# Patient Record
Sex: FEMALE | Race: WHITE | NOT HISPANIC OR LATINO | Employment: OTHER | URBAN - METROPOLITAN AREA
[De-identification: names, ages, dates, MRNs, and addresses within clinical notes are randomized per-mention and may not be internally consistent; named-entity substitution may affect disease eponyms.]

---

## 2017-02-03 ENCOUNTER — OFFICE VISIT (OUTPATIENT)
Dept: URGENT CARE | Facility: PHYSICIAN GROUP | Age: 82
End: 2017-02-03
Payer: COMMERCIAL

## 2017-02-03 ENCOUNTER — HOSPITAL ENCOUNTER (OUTPATIENT)
Facility: MEDICAL CENTER | Age: 82
End: 2017-02-03
Attending: NURSE PRACTITIONER
Payer: COMMERCIAL

## 2017-02-03 VITALS
TEMPERATURE: 97.5 F | HEIGHT: 60 IN | WEIGHT: 125 LBS | HEART RATE: 71 BPM | BODY MASS INDEX: 24.54 KG/M2 | RESPIRATION RATE: 16 BRPM | DIASTOLIC BLOOD PRESSURE: 70 MMHG | OXYGEN SATURATION: 97 % | SYSTOLIC BLOOD PRESSURE: 118 MMHG

## 2017-02-03 DIAGNOSIS — R30.0 DYSURIA: ICD-10-CM

## 2017-02-03 LAB
APPEARANCE UR: NORMAL
BILIRUB UR STRIP-MCNC: NORMAL MG/DL
COLOR UR AUTO: YELLOW
GLUCOSE UR STRIP.AUTO-MCNC: NORMAL MG/DL
KETONES UR STRIP.AUTO-MCNC: NORMAL MG/DL
LEUKOCYTE ESTERASE UR QL STRIP.AUTO: NORMAL
NITRITE UR QL STRIP.AUTO: NORMAL
PH UR STRIP.AUTO: 7.5 [PH] (ref 5–8)
PROT UR QL STRIP: NORMAL MG/DL
RBC UR QL AUTO: NORMAL
SP GR UR STRIP.AUTO: 1
UROBILINOGEN UR STRIP-MCNC: NORMAL MG/DL

## 2017-02-03 PROCEDURE — G8484 FLU IMMUNIZE NO ADMIN: HCPCS | Performed by: NURSE PRACTITIONER

## 2017-02-03 PROCEDURE — 81002 URINALYSIS NONAUTO W/O SCOPE: CPT | Performed by: NURSE PRACTITIONER

## 2017-02-03 PROCEDURE — 99213 OFFICE O/P EST LOW 20 MIN: CPT | Performed by: NURSE PRACTITIONER

## 2017-02-03 PROCEDURE — G8432 DEP SCR NOT DOC, RNG: HCPCS | Performed by: NURSE PRACTITIONER

## 2017-02-03 PROCEDURE — 87086 URINE CULTURE/COLONY COUNT: CPT

## 2017-02-03 PROCEDURE — 1036F TOBACCO NON-USER: CPT | Performed by: NURSE PRACTITIONER

## 2017-02-03 PROCEDURE — 4040F PNEUMOC VAC/ADMIN/RCVD: CPT | Performed by: NURSE PRACTITIONER

## 2017-02-03 PROCEDURE — G8420 CALC BMI NORM PARAMETERS: HCPCS | Performed by: NURSE PRACTITIONER

## 2017-02-03 PROCEDURE — 1101F PT FALLS ASSESS-DOCD LE1/YR: CPT | Mod: 8P | Performed by: NURSE PRACTITIONER

## 2017-02-03 PROCEDURE — 87186 SC STD MICRODIL/AGAR DIL: CPT

## 2017-02-03 PROCEDURE — 87077 CULTURE AEROBIC IDENTIFY: CPT

## 2017-02-03 RX ORDER — NITROFURANTOIN 25; 75 MG/1; MG/1
100 CAPSULE ORAL 2 TIMES DAILY
Qty: 10 CAP | Refills: 0 | Status: SHIPPED | OUTPATIENT
Start: 2017-02-03 | End: 2017-02-08

## 2017-02-03 ASSESSMENT — ENCOUNTER SYMPTOMS
FLANK PAIN: 0
VOMITING: 0
NAUSEA: 0
CHILLS: 0
FEVER: 0

## 2017-02-03 NOTE — PROGRESS NOTES
Subjective:      Rosa M Reeves is a 86 y.o. female who presents with No chief complaint on file.    Past Medical History   Diagnosis Date   • Hypertension    • GERD (gastroesophageal reflux disease)      Social History     Social History   • Marital Status:      Spouse Name: N/A   • Number of Children: N/A   • Years of Education: N/A     Occupational History   • Not on file.     Social History Main Topics   • Smoking status: Never Smoker    • Smokeless tobacco: Never Used   • Alcohol Use: Yes      Comment: holidays   • Drug Use: No   • Sexual Activity: Not Currently     Other Topics Concern   • Not on file     Social History Narrative     Family History   Problem Relation Age of Onset   • Heart Disease Mother    • Heart Disease Father    • Cancer Neg Hx    • Diabetes Neg Hx        Allergies: Review of patient's allergies indicates no known allergies.    This patient is a 86-year-old female who presents today with complaint of dysuria, urgency, and frequency. Symptoms started this morning. She does not have a prior history of recurrent bladder infections, and states it has been a long time since her last infection. She denies fever, aches, or chills. She denies any lower back pain.              Dysuria   This is a new problem. The current episode started today. The problem occurs every urination. The problem has been unchanged. The quality of the pain is described as aching and burning. The pain is moderate. There has been no fever. Associated symptoms include frequency and urgency. Pertinent negatives include no chills, flank pain, hematuria, nausea or vomiting. She has tried nothing for the symptoms. The treatment provided no relief.       Review of Systems   Constitutional: Negative for fever, chills and malaise/fatigue.   Gastrointestinal: Negative for nausea and vomiting.   Genitourinary: Positive for dysuria, urgency and frequency. Negative for hematuria and flank pain.   Skin: Negative.        All other  "systems reviewed and are negative        Objective:     There were no vitals taken for this visit.     Physical Exam   Constitutional: She is oriented to person, place, and time. She appears well-developed and well-nourished. No distress.   Neck: Normal range of motion. Neck supple.   Cardiovascular: Normal rate and regular rhythm.    Pulmonary/Chest: Effort normal and breath sounds normal.   Abdominal: Soft. Bowel sounds are normal. She exhibits no distension and no mass. There is no tenderness. There is no rebound and no guarding. No hernia.   No CVA tenderness, no suprapubic tenderness.   Neurological: She is alert and oriented to person, place, and time.   Skin: Skin is warm and dry. She is not diaphoretic.   Psychiatric: She has a normal mood and affect. Her behavior is normal.   Vitals reviewed.           UA:\" Positive for blood, positive leukocytes     Assessment/Plan:      Urinary tract infection  Dysuria  -Macrobid-  -urine culture-  -push fluids  -Return for persistent or worsening of symptoms        There are no diagnoses linked to this encounter.      "

## 2017-02-03 NOTE — MR AVS SNAPSHOT
Rosa M Reeves   2/3/2017 1:15 PM   Office Visit   MRN: [id number]    Department:  [facility]   Dept Phone:  [phone]    Description:  Female : 1930   Provider:  Cathey J Hamman, A.P.N.           Reason for Visit     Dysuria Difficulty urinating, burning upon urination, onset this morning.      Allergies as of 2/3/2017     No Known Allergies      You were diagnosed with     Dysuria   [788.1.ICD-9-CM]         Vital Signs     Blood Pressure Pulse Temperature Respirations Height Weight    118/70 mmHg 71 36.4 °C (97.5 °F) 16 1.524 m (5') 56.7 kg (125 lb)    Body Mass Index Oxygen Saturation Smoking Status             24.41 kg/m2 97% Never Smoker          Basic Information     Date Of Birth Sex Race Ethnicity Preferred Language    1930 Female White Non- English      Your appointments     Mar 20, 2017  2:20 PM   Established Patient with Faith Burr D.O.   [facility] ([place])    [address] [place] [id number] [phone]           You will be receiving a confirmation call a few days before your appointment from our automated call confirmation system.              Problem List              ICD-10-CM Priority Class Noted - Resolved    Aphasia R47.01   2016 - Present    Essential hypertension I10   2/10/2016 - Present    Gastroesophageal reflux disease without esophagitis K21.9   2/10/2016 - Present    Mild intermittent asthma without complication J45.20   2/10/2016 - Present    Pure hypercholesterolemia E78.00   2016 - Present      Health Maintenance        Date Due Completion Dates    IMM DTaP/Tdap/Td Vaccine (1 - Tdap) 1949 ---    IMM ZOSTER VACCINE 1990 ---    BONE DENSITY 1995 ---    IMM INFLUENZA (1) 2016    IMM PNEUMOCOCCAL 65+ (ADULT) LOW/MEDIUM RISK SERIES (2 of 2 - PPSV23) 2016            Results     POCT Urinalysis      Component Value Standard Range & Units    POC Color Yellow Negative    POC  Appearance Cloudy Negative    POC Leukocyte Esterase MOD Negative    POC Nitrites Neg Negative    POC Urobiligen Neg Negative (0.2) mg/dL    POC Protein Neg Negative mg/dL    POC Urine PH 7.5 5.0 - 8.0    POC Blood MOD Negative    POC Specific Gravity 1.005 <1.005 - >1.030    POC Ketones Neg Negative mg/dL    POC Biliruben Neg Negative mg/dL    POC Glucose Neg Negative mg/dL                        Current Immunizations     13-VALENT PCV PREVNAR 11/5/2015    Influenza Vaccine Quad Inj (Pf) 11/5/2015      Below and/or attached are the medications your provider expects you to take. Review all of your home medications and newly ordered medications with your provider and/or pharmacist. Follow medication instructions as directed by your provider and/or pharmacist. Please keep your medication list with you and share with your provider. Update the information when medications are discontinued, doses are changed, or new medications (including over-the-counter products) are added; and carry medication information at all times in the event of emergency situations     Allergies:  No Known Allergies          Medications  Valid as of: February 03, 2017 -  6:17 PM    Generic Name Brand Name Tablet Size Instructions for use    Albuterol Sulfate (Aero Soln) albuterol 108 (90 BASE) MCG/ACT Inhale 2 Puffs by mouth every 6 hours as needed for Shortness of Breath.        AmLODIPine Besylate (Tab) NORVASC 5 MG Take 1 Tab by mouth every day.        Aspirin (Tablet Delayed Response) ECOTRIN 81 MG Take 1 Tab by mouth every day.        Atorvastatin Calcium (Tab) LIPITOR 20 MG Take 1 Tab by mouth every day.        Benzonatate (Cap) TESSALON 200 MG Take 1 Cap by mouth 3 times a day as needed for Cough.        Guaifenesin-Codeine (Solution) ROBITUSSIN -10 mg/5mL 5-10 ML (1-2 TEASPOONS) EVERY 6 HOURS ONLY IF NEEDED FOR COUGH. MAY CAUSE DROWSINESS.        HydroCHLOROthiazide (Cap) MICROZIDE 12.5 MG Take 1 Cap by mouth every day.         LevoFLOXacin (Tab) LEVAQUIN 250 MG 1 TAB ONCE A DAY X 10 DAYS.        Losartan Potassium (Tab) COZAAR 50 MG Take 1 Tab by mouth every day.        Nitrofurantoin Monohyd Macro (Cap) MACROBID 100 MG Take 1 Cap by mouth 2 times a day for 5 days.        Omeprazole (CAPSULE DELAYED RELEASE) PRILOSEC 20 MG Take 1 Cap by mouth every day.        .                 Medicines prescribed today were sent to:     [facility]/PHARMACY #[id number] - [place], [place] - [address]    [address] [place] [address]    Phone: [phone] Fax: [phone]    Open 24 Hours?: No    [facility] - [place], [place] - [address] AT PORTAL TO REGISTERED [facility]    [address] [facility] [address]    Phone: [phone] Fax: [phone]    Open 24 Hours?: No      Medication refill instructions:       If your prescription bottle indicates you have medication refills left, it is not necessary to call your provider’s office. Please contact your pharmacy and they will refill your medication.    If your prescription bottle indicates you do not have any refills left, you may request refills at any time through one of the following ways: The online Workec system (except Urgent Care), by calling your provider’s office, or by asking your pharmacy to contact your provider’s office with a refill request. Medication refills are processed only during regular business hours and may not be available until the next business day. Your provider may request additional information or to have a follow-up visit with you prior to refilling your medication.   *Please Note: Medication refills are assigned a new Rx number when refilled electronically. Your pharmacy may indicate that no refills were authorized even though a new prescription for the same medication is available at the pharmacy. Please request the medicine by name with the pharmacy before contacting your provider for a refill.        Your To Do List     Future  Labs/Procedures Complete By Expires    URINE CULTURE(NEW)  As directed 2/3/2018         AXSionics Access Code: [email or web address]  Expires: 3/5/2017  6:17 PM    AXSionics  A secure, online tool to manage your health information     [employer]’s AXSionics® is a secure, online tool that connects you to your personalized health information from the privacy of your home -- day or night - making it very easy for you to manage your healthcare. Once the activation process is completed, you can even access your medical information using the AXSionics oswaldo, which is available for free in the Apple Oswaldo store or Google Play store.     AXSionics provides the following levels of access (as shown below):   My Chart Features   [facility] Doctor [facility]  Specialists [facility]  Urgent  Care [facility]  Primary Care  Doctor   Email your healthcare team securely and privately 24/7 X X X    Manage appointments: schedule your next appointment; view details of past/upcoming appointments X      Request prescription refills. X      View recent personal medical records, including lab and immunizations X X X X   View health record, including health history, allergies, medications X X X X   Read reports about your outpatient visits, procedures, consult and ER notes X X X X   See your discharge summary, which is a recap of your hospital and/or ER visit that includes your diagnosis, lab results, and care plan. X X       How to register for AXSionics:  1. Go to  [email or web address].  2. Click on the Sign Up Now box, which takes you to the New Member Sign Up page. You will need to provide the following information:  a. Enter your AXSionics Access Code exactly as it appears at the top of this page. (You will not need to use this code after you’ve completed the sign-up process. If you do not sign up before the expiration date, you must request a new code.)   b. Enter your date of birth.   c. Enter your home email address.   d. Click Submit, and  follow the next screen’s instructions.  3. Create a Lefthand Networkst ID. This will be your Bubbles and Beyond login ID and cannot be changed, so think of one that is secure and easy to remember.  4. Create a Lefthand Networkst password. You can change your password at any time.  5. Enter your Password Reset Question and Answer. This can be used at a later time if you forget your password.   6. Enter your e-mail address. This allows you to receive e-mail notifications when new information is available in Bubbles and Beyond.  7. Click Sign Up. You can now view your health information.    For assistance activating your Bubbles and Beyond account, call [phone]

## 2017-02-05 LAB
BACTERIA UR CULT: ABNORMAL
BACTERIA UR CULT: ABNORMAL
SIGNIFICANT IND 70042: ABNORMAL
SOURCE SOURCE: ABNORMAL

## 2017-02-18 ENCOUNTER — HOSPITAL ENCOUNTER (OUTPATIENT)
Dept: LAB | Facility: MEDICAL CENTER | Age: 82
End: 2017-02-18
Attending: FAMILY MEDICINE
Payer: COMMERCIAL

## 2017-02-18 ENCOUNTER — TELEPHONE (OUTPATIENT)
Dept: MEDICAL GROUP | Facility: MEDICAL CENTER | Age: 82
End: 2017-02-18

## 2017-02-18 DIAGNOSIS — E83.52 HYPERCALCEMIA: ICD-10-CM

## 2017-02-18 DIAGNOSIS — E78.00 PURE HYPERCHOLESTEROLEMIA: ICD-10-CM

## 2017-02-18 DIAGNOSIS — I10 ESSENTIAL HYPERTENSION: ICD-10-CM

## 2017-02-18 LAB
ALBUMIN SERPL BCP-MCNC: 4.3 G/DL (ref 3.2–4.9)
ALBUMIN/GLOB SERPL: 1.4 G/DL
ALP SERPL-CCNC: 69 U/L (ref 30–99)
ALT SERPL-CCNC: 17 U/L (ref 2–50)
ANION GAP SERPL CALC-SCNC: 10 MMOL/L (ref 0–11.9)
AST SERPL-CCNC: 20 U/L (ref 12–45)
BILIRUB SERPL-MCNC: 0.7 MG/DL (ref 0.1–1.5)
BUN SERPL-MCNC: 40 MG/DL (ref 8–22)
CALCIUM SERPL-MCNC: 13.4 MG/DL (ref 8.5–10.5)
CHLORIDE SERPL-SCNC: 101 MMOL/L (ref 96–112)
CO2 SERPL-SCNC: 31 MMOL/L (ref 20–33)
CREAT SERPL-MCNC: 1.64 MG/DL (ref 0.5–1.4)
GLOBULIN SER CALC-MCNC: 3.1 G/DL (ref 1.9–3.5)
GLUCOSE SERPL-MCNC: 107 MG/DL (ref 65–99)
POTASSIUM SERPL-SCNC: 4.2 MMOL/L (ref 3.6–5.5)
PROT SERPL-MCNC: 7.4 G/DL (ref 6–8.2)
SODIUM SERPL-SCNC: 142 MMOL/L (ref 135–145)

## 2017-02-18 PROCEDURE — 36415 COLL VENOUS BLD VENIPUNCTURE: CPT

## 2017-02-18 PROCEDURE — 82306 VITAMIN D 25 HYDROXY: CPT

## 2017-02-18 PROCEDURE — 80061 LIPID PANEL: CPT

## 2017-02-18 PROCEDURE — 82570 ASSAY OF URINE CREATININE: CPT

## 2017-02-18 PROCEDURE — 82043 UR ALBUMIN QUANTITATIVE: CPT

## 2017-02-18 PROCEDURE — 80053 COMPREHEN METABOLIC PANEL: CPT

## 2017-02-19 LAB
25(OH)D3 SERPL-MCNC: 27 NG/ML (ref 30–100)
CHOLEST SERPL-MCNC: 218 MG/DL (ref 100–199)
CREAT UR-MCNC: 129.8 MG/DL
HDLC SERPL-MCNC: 46 MG/DL
LDLC SERPL CALC-MCNC: 126 MG/DL
MICROALBUMIN UR-MCNC: 2.5 MG/DL
MICROALBUMIN/CREAT UR: 19 MG/G (ref 0–30)
TRIGL SERPL-MCNC: 232 MG/DL (ref 0–149)

## 2017-02-21 ENCOUNTER — OFFICE VISIT (OUTPATIENT)
Dept: MEDICAL GROUP | Facility: PHYSICIAN GROUP | Age: 82
End: 2017-02-21
Payer: COMMERCIAL

## 2017-02-21 VITALS
HEART RATE: 78 BPM | WEIGHT: 130 LBS | BODY MASS INDEX: 25.52 KG/M2 | HEIGHT: 60 IN | TEMPERATURE: 98.2 F | DIASTOLIC BLOOD PRESSURE: 72 MMHG | SYSTOLIC BLOOD PRESSURE: 112 MMHG | OXYGEN SATURATION: 98 %

## 2017-02-21 DIAGNOSIS — I10 ESSENTIAL HYPERTENSION: ICD-10-CM

## 2017-02-21 DIAGNOSIS — E83.52 HYPERCALCEMIA: ICD-10-CM

## 2017-02-21 DIAGNOSIS — R93.89 ABNORMAL CHEST CT: ICD-10-CM

## 2017-02-21 DIAGNOSIS — K21.9 GASTROESOPHAGEAL REFLUX DISEASE WITHOUT ESOPHAGITIS: ICD-10-CM

## 2017-02-21 PROCEDURE — 1036F TOBACCO NON-USER: CPT | Performed by: FAMILY MEDICINE

## 2017-02-21 PROCEDURE — 4040F PNEUMOC VAC/ADMIN/RCVD: CPT | Performed by: FAMILY MEDICINE

## 2017-02-21 PROCEDURE — 1101F PT FALLS ASSESS-DOCD LE1/YR: CPT | Performed by: FAMILY MEDICINE

## 2017-02-21 PROCEDURE — 99214 OFFICE O/P EST MOD 30 MIN: CPT | Performed by: FAMILY MEDICINE

## 2017-02-21 PROCEDURE — G8432 DEP SCR NOT DOC, RNG: HCPCS | Performed by: FAMILY MEDICINE

## 2017-02-21 PROCEDURE — G8484 FLU IMMUNIZE NO ADMIN: HCPCS | Performed by: FAMILY MEDICINE

## 2017-02-21 PROCEDURE — G8420 CALC BMI NORM PARAMETERS: HCPCS | Performed by: FAMILY MEDICINE

## 2017-02-21 RX ORDER — HYDROCHLOROTHIAZIDE 12.5 MG/1
12.5 CAPSULE, GELATIN COATED ORAL DAILY
Qty: 90 CAP | Refills: 3 | Status: SHIPPED | OUTPATIENT
Start: 2017-02-21 | End: 2017-03-20

## 2017-02-21 NOTE — ASSESSMENT & PLAN NOTE
Ongoing issue. Patient had previously been evaluated through urgent care with a CT scan and there was a 5 mm incidental finding. Patient does not have any smoking history nor does she have any significant history of exposure that could increase her risk of lung cancer. Recommendation at that time was to repeat the chest CT in 3 months. Patient is denying any issues with wheezing, cough, chest pain.

## 2017-02-21 NOTE — MR AVS SNAPSHOT
Rosa M Reeves   2017 10:00 AM   Office Visit   MRN: [id number]    Department:  [facility]   Dept Phone:  [phone]    Description:  Female : 1930   Provider:  Faith Burr D.O.           Reason for Visit     Abnormal Labs           Allergies as of 2017     No Known Allergies      You were diagnosed with     Hypercalcemia   [275.42.ICD-9-CM]       Essential hypertension   [7666153]       Abnormal chest CT   [510880]       Gastroesophageal reflux disease without esophagitis   [390182]         Vital Signs     Blood Pressure Pulse Temperature Height Weight Body Mass Index    112/72 mmHg 78 36.8 °C (98.2 °F) 1.524 m (5') 58.968 kg (130 lb) 25.39 kg/m2    Oxygen Saturation Smoking Status                98% Never Smoker           Basic Information     Date Of Birth Sex Race Ethnicity Preferred Language    1930 Female White Non- English      Your appointments     Mar 20, 2017  2:20 PM   Established Patient with Faith Burr D.O.   [facility] ([address] [address]   [phone]           You will be receiving a confirmation call a few days before your appointment from our automated call confirmation system.              Problem List              ICD-10-CM Priority Class Noted - Resolved    Aphasia R47.01   2016 - Present    Essential hypertension I10   2/10/2016 - Present    Gastroesophageal reflux disease without esophagitis K21.9   2/10/2016 - Present    Mild intermittent asthma without complication J45.20   2/10/2016 - Present    Pure hypercholesterolemia E78.00   2016 - Present    Hypercalcemia E83.52   2017 - Present    Abnormal chest CT R93.8   2017 - Present      Health Maintenance        Date Due Completion Dates    IMM DTaP/Tdap/Td Vaccine (1 - Tdap) 1949 ---    IMM ZOSTER VACCINE 1990 ---    BONE DENSITY 1995 ---    IMM INFLUENZA (1) 2016    IMM PNEUMOCOCCAL 65+ (ADULT)  LOW/MEDIUM RISK SERIES (2 of 2 - PPSV23) 11/5/2016 11/5/2015            Current Immunizations     13-VALENT PCV PREVNAR 11/5/2015    Influenza Vaccine Quad Inj (Pf) 11/5/2015      Below and/or attached are the medications your provider expects you to take. Review all of your home medications and newly ordered medications with your provider and/or pharmacist. Follow medication instructions as directed by your provider and/or pharmacist. Please keep your medication list with you and share with your provider. Update the information when medications are discontinued, doses are changed, or new medications (including over-the-counter products) are added; and carry medication information at all times in the event of emergency situations     Allergies:  No Known Allergies          Medications  Valid as of: February 21, 2017 - 10:11 AM    Generic Name Brand Name Tablet Size Instructions for use    Albuterol Sulfate (Aero Soln) albuterol 108 (90 BASE) MCG/ACT Inhale 2 Puffs by mouth every 6 hours as needed for Shortness of Breath.        AmLODIPine Besylate (Tab) NORVASC 5 MG Take 1 Tab by mouth every day.        Aspirin (Tablet Delayed Response) ECOTRIN 81 MG Take 1 Tab by mouth every day.        Atorvastatin Calcium (Tab) LIPITOR 20 MG Take 1 Tab by mouth every day.        Benzonatate (Cap) TESSALON 200 MG Take 1 Cap by mouth 3 times a day as needed for Cough.        Guaifenesin-Codeine (Solution) ROBITUSSIN -10 mg/5mL 5-10 ML (1-2 TEASPOONS) EVERY 6 HOURS ONLY IF NEEDED FOR COUGH. MAY CAUSE DROWSINESS.        HydroCHLOROthiazide (Cap) MICROZIDE 12.5 MG Take 1 Cap by mouth every day.        Losartan Potassium (Tab) COZAAR 50 MG Take 1 Tab by mouth every day.        Omeprazole (CAPSULE DELAYED RELEASE) PRILOSEC 20 MG Take 1 Cap by mouth every day.        .                 Medicines prescribed today were sent to:     [facility]/PHARMACY #[id number] - [place] - [address]    [address] [place] [address]    Phone:  [phone] Fax: [phone]    Open 24 Hours?: No    [facility] - [place], [place] - [address] [facility] [address] [facility]    [address] [facility] [address]    Phone: [phone] Fax: [phone]    Open 24 Hours?: No      Medication refill instructions:       If your prescription bottle indicates you have medication refills left, it is not necessary to call your provider’s office. Please contact your pharmacy and they will refill your medication.    If your prescription bottle indicates you do not have any refills left, you may request refills at any time through one of the following ways: The online iCrumz system (except Urgent Care), by calling your provider’s office, or by asking your pharmacy to contact your provider’s office with a refill request. Medication refills are processed only during regular business hours and may not be available until the next business day. Your provider may request additional information or to have a follow-up visit with you prior to refilling your medication.   *Please Note: Medication refills are assigned a new Rx number when refilled electronically. Your pharmacy may indicate that no refills were authorized even though a new prescription for the same medication is available at the pharmacy. Please request the medicine by name with the pharmacy before contacting your provider for a refill.        Your To Do List     Future Labs/Procedures Complete By Expires    CT-CHEST (THORAX) W/O  As directed 8/24/2017         iCrumz Access Code: [email or web address]  Expires: 3/5/2017  6:17 PM    iCrumz  A secure, online tool to manage your health information     [employer]’s iCrumz® is a secure, online tool that connects you to your personalized health information from the privacy of your home -- day or night - making it very easy for you to manage your healthcare. Once the activation process is completed, you can even access your medical information using the  HoozOn oswaldo, which is available for free in the Apple Oswaldo store or Google Play store.     HoozOn provides the following levels of access (as shown below):   My Chart Features   [facility] Doctor [facility]  Specialists [facility]  Urgent  Care [facility]  Primary Care  Doctor   Email your healthcare team securely and privately 24/7 X X X    Manage appointments: schedule your next appointment; view details of past/upcoming appointments X      Request prescription refills. X      View recent personal medical records, including lab and immunizations X X X X   View health record, including health history, allergies, medications X X X X   Read reports about your outpatient visits, procedures, consult and ER notes X X X X   See your discharge summary, which is a recap of your hospital and/or ER visit that includes your diagnosis, lab results, and care plan. X X       How to register for HoozOn:  1. Go to  [email or web address].  2. Click on the Sign Up Now box, which takes you to the New Member Sign Up page. You will need to provide the following information:  a. Enter your HoozOn Access Code exactly as it appears at the top of this page. (You will not need to use this code after you’ve completed the sign-up process. If you do not sign up before the expiration date, you must request a new code.)   b. Enter your date of birth.   c. Enter your home email address.   d. Click Submit, and follow the next screen’s instructions.  3. Create a HoozOn ID. This will be your HoozOn login ID and cannot be changed, so think of one that is secure and easy to remember.  4. Create a HoozOn password. You can change your password at any time.  5. Enter your Password Reset Question and Answer. This can be used at a later time if you forget your password.   6. Enter your e-mail address. This allows you to receive e-mail notifications when new information is available in HoozOn.  7. Click Sign Up. You can now view your health  information.    For assistance activating your BusyEvent account, call [phone]

## 2017-02-21 NOTE — ASSESSMENT & PLAN NOTE
Ongoing issue. Patient reports 100% compliance with omeprazole 20 mg daily. Patient states that this manages her reflux symptoms completely and she does not need to make any other lifestyle changes.

## 2017-02-21 NOTE — PROGRESS NOTES
Subjective:   Rosa M Reeves is a 86 y.o. female here today for elevated calcium level; GERD; HTN; abnormal CT of the chest    Hypercalcemia  New problem. Patient had blood work done and it revealed a calcium level of 13.4. Chart review shows the patient has never had an elevated serum calcium level in the past. She does report that she takes over-the-counter calcium; she's never had any kidney issues in the past including no history of kidney stones.    Today patient is denying any issues with muscle spasms, muscle weakness, fatigue, or urinary issues. Of note, patient does utilize hydrochlorothiazide.    Gastroesophageal reflux disease without esophagitis  Ongoing issue. Patient reports 100% compliance with omeprazole 20 mg daily. Patient states that this manages her reflux symptoms completely and she does not need to make any other lifestyle changes.    Essential hypertension  Ongoing issue. Patient reports 100% compliance with amlodipine 5 mg, losartan 50 mg and hydrochlorothiazide 12.5 mg. Patient denies any issues with headache, dizziness, chest pain.        Abnormal chest CT  Ongoing issue. Patient had previously been evaluated through urgent care with a CT scan and there was a 5 mm incidental finding. Patient does not have any smoking history nor does she have any significant history of exposure that could increase her risk of lung cancer. Recommendation at that time was to repeat the chest CT in 3 months. Patient is denying any issues with wheezing, cough, chest pain.         Current medicines (including changes today)  Current Outpatient Prescriptions   Medication Sig Dispense Refill   • hydrochlorothiazide (MICROZIDE) 12.5 MG capsule Take 1 Cap by mouth every day. 90 Cap 3   • guaifenesin-codeine (ROBITUSSIN AC) Solution oral solution 5-10 ML (1-2 TEASPOONS) EVERY 6 HOURS ONLY IF NEEDED FOR COUGH. MAY CAUSE DROWSINESS. 240 mL 0   • benzonatate (TESSALON) 200 MG capsule Take 1 Cap by mouth 3 times a day as  needed for Cough. 30 Cap 0   • omeprazole (PRILOSEC) 20 MG delayed-release capsule Take 1 Cap by mouth every day. 90 Cap 1   • losartan (COZAAR) 50 MG Tab Take 1 Tab by mouth every day. 90 Tab 1   • atorvastatin (LIPITOR) 20 MG Tab Take 1 Tab by mouth every day. 90 Tab 3   • amlodipine (NORVASC) 5 MG Tab Take 1 Tab by mouth every day. 90 Tab 3   • aspirin EC (ECOTRIN) 81 MG Tablet Delayed Response Take 1 Tab by mouth every day. 30 Tab 0   • albuterol (VENTOLIN OR PROVENTIL) 108 (90 BASE) MCG/ACT Aero Soln inhalation aerosol Inhale 2 Puffs by mouth every 6 hours as needed for Shortness of Breath. 8.5 g 0     No current facility-administered medications for this visit.     She  has a past medical history of Hypertension and GERD (gastroesophageal reflux disease).    ROS   No chest pain, no shortness of breath, no abdominal pain       Objective:     Blood pressure 112/72, pulse 78, temperature 36.8 °C (98.2 °F), height 1.524 m (5'), weight 58.968 kg (130 lb), SpO2 98 %. Body mass index is 25.39 kg/(m^2).   Physical Exam:  Alert, oriented in no acute distress.  Eye contact is good, speech goal directed, affect calm  HEENT: conjunctiva non-injected, sclera non-icteric.  Pinna normal. TM pearly gray.   Oral mucous membranes pink and moist with no lesions.  Neck No adenopathy or masses in the neck or supraclavicular regions.  Lungs: clear to auscultation bilaterally with good excursion.  CV: regular rate and rhythm. Moderate systolic ejection murmur noted  Abdomen: soft, nontender, No CVAT  Ext: no edema, color normal, vascularity normal, temperature normal  MSK: no tetani noted on the face; good/normal  bilaterally      Assessment and Plan:   The following treatment plan was discussed     1. Hypercalcemia      Unknown origin. Patient will stop her calcium supplement; recheck labs in one month. If no improvement may need to consider stopping hydrochlorothiazide. Monito   2. Essential hypertension  hydrochlorothiazide  (MICROZIDE) 12.5 MG capsule    Stable. Continue current medication for now; monitor   3. Abnormal chest CT  CT-CHEST (THORAX) W/O    Stable. Repeat chest CT has been ordered; monitor for results   4. Gastroesophageal reflux disease without esophagitis      Stable. Continue current medications; monitor       Followup: Return if symptoms worsen or fail to improve.

## 2017-02-21 NOTE — ASSESSMENT & PLAN NOTE
New problem. Patient had blood work done and it revealed a calcium level of 13.4. Chart review shows the patient has never had an elevated serum calcium level in the past. She does report that she takes over-the-counter calcium; she's never had any kidney issues in the past including no history of kidney stones.    Today patient is denying any issues with muscle spasms, muscle weakness, fatigue, or urinary issues. Of note, patient does utilize hydrochlorothiazide.

## 2017-03-02 ENCOUNTER — HOSPITAL ENCOUNTER (OUTPATIENT)
Dept: RADIOLOGY | Facility: MEDICAL CENTER | Age: 82
End: 2017-03-02
Attending: FAMILY MEDICINE
Payer: COMMERCIAL

## 2017-03-02 DIAGNOSIS — R93.89 ABNORMAL CHEST CT: ICD-10-CM

## 2017-03-02 PROCEDURE — 71250 CT THORAX DX C-: CPT

## 2017-03-03 ENCOUNTER — TELEPHONE (OUTPATIENT)
Dept: MEDICAL GROUP | Facility: PHYSICIAN GROUP | Age: 82
End: 2017-03-03

## 2017-03-03 NOTE — TELEPHONE ENCOUNTER
----- Message from Faith Burr D.O. sent at 3/3/2017  9:54 AM PST -----  Please advise pt that the Chest CT does show the nodule in the right upper lobe but this is unchanged.  As a result, the recommendation is to just repeat the CT in one year.  If you have any questions, please let me know.    Faith Burr D.O.

## 2017-03-14 ENCOUNTER — HOSPITAL ENCOUNTER (OUTPATIENT)
Dept: LAB | Facility: MEDICAL CENTER | Age: 82
End: 2017-03-14
Attending: FAMILY MEDICINE
Payer: COMMERCIAL

## 2017-03-14 DIAGNOSIS — E83.52 HYPERCALCEMIA: ICD-10-CM

## 2017-03-14 LAB
25(OH)D3 SERPL-MCNC: 30 NG/ML (ref 30–100)
ALBUMIN SERPL BCP-MCNC: 4.4 G/DL (ref 3.2–4.9)
ALBUMIN/GLOB SERPL: 1.6 G/DL
ALP SERPL-CCNC: 72 U/L (ref 30–99)
ALT SERPL-CCNC: 15 U/L (ref 2–50)
ANION GAP SERPL CALC-SCNC: 8 MMOL/L (ref 0–11.9)
AST SERPL-CCNC: 17 U/L (ref 12–45)
BILIRUB SERPL-MCNC: 0.4 MG/DL (ref 0.1–1.5)
BUN SERPL-MCNC: 39 MG/DL (ref 8–22)
CALCIUM SERPL-MCNC: 10.2 MG/DL (ref 8.5–10.5)
CHLORIDE SERPL-SCNC: 103 MMOL/L (ref 96–112)
CO2 SERPL-SCNC: 25 MMOL/L (ref 20–33)
CREAT SERPL-MCNC: 2.21 MG/DL (ref 0.5–1.4)
GLOBULIN SER CALC-MCNC: 2.7 G/DL (ref 1.9–3.5)
GLUCOSE SERPL-MCNC: 87 MG/DL (ref 65–99)
POTASSIUM SERPL-SCNC: 5.6 MMOL/L (ref 3.6–5.5)
PROT SERPL-MCNC: 7.1 G/DL (ref 6–8.2)
PTH-INTACT SERPL-MCNC: 274.8 PG/ML (ref 14–72)
SODIUM SERPL-SCNC: 136 MMOL/L (ref 135–145)

## 2017-03-14 PROCEDURE — 80053 COMPREHEN METABOLIC PANEL: CPT

## 2017-03-14 PROCEDURE — 36415 COLL VENOUS BLD VENIPUNCTURE: CPT

## 2017-03-14 PROCEDURE — 82306 VITAMIN D 25 HYDROXY: CPT

## 2017-03-14 PROCEDURE — 83970 ASSAY OF PARATHORMONE: CPT

## 2017-03-20 ENCOUNTER — OFFICE VISIT (OUTPATIENT)
Dept: MEDICAL GROUP | Facility: PHYSICIAN GROUP | Age: 82
End: 2017-03-20
Payer: COMMERCIAL

## 2017-03-20 VITALS
OXYGEN SATURATION: 93 % | BODY MASS INDEX: 26.7 KG/M2 | WEIGHT: 136 LBS | SYSTOLIC BLOOD PRESSURE: 132 MMHG | HEART RATE: 70 BPM | TEMPERATURE: 97.2 F | HEIGHT: 60 IN | DIASTOLIC BLOOD PRESSURE: 74 MMHG

## 2017-03-20 DIAGNOSIS — Z78.0 MENOPAUSE: ICD-10-CM

## 2017-03-20 DIAGNOSIS — I10 ESSENTIAL HYPERTENSION: ICD-10-CM

## 2017-03-20 DIAGNOSIS — N18.4 CKD (CHRONIC KIDNEY DISEASE) STAGE 4, GFR 15-29 ML/MIN (HCC): ICD-10-CM

## 2017-03-20 DIAGNOSIS — E83.52 HYPERCALCEMIA: ICD-10-CM

## 2017-03-20 DIAGNOSIS — E78.2 MIXED HYPERLIPIDEMIA: ICD-10-CM

## 2017-03-20 DIAGNOSIS — K21.9 GASTROESOPHAGEAL REFLUX DISEASE, ESOPHAGITIS PRESENCE NOT SPECIFIED: ICD-10-CM

## 2017-03-20 DIAGNOSIS — K21.9 GASTROESOPHAGEAL REFLUX DISEASE WITHOUT ESOPHAGITIS: ICD-10-CM

## 2017-03-20 DIAGNOSIS — Z12.31 ENCOUNTER FOR SCREENING MAMMOGRAM FOR BREAST CANCER: ICD-10-CM

## 2017-03-20 PROCEDURE — G8432 DEP SCR NOT DOC, RNG: HCPCS | Performed by: FAMILY MEDICINE

## 2017-03-20 PROCEDURE — G8420 CALC BMI NORM PARAMETERS: HCPCS | Performed by: FAMILY MEDICINE

## 2017-03-20 PROCEDURE — 1036F TOBACCO NON-USER: CPT | Performed by: FAMILY MEDICINE

## 2017-03-20 PROCEDURE — 4040F PNEUMOC VAC/ADMIN/RCVD: CPT | Performed by: FAMILY MEDICINE

## 2017-03-20 PROCEDURE — 1101F PT FALLS ASSESS-DOCD LE1/YR: CPT | Performed by: FAMILY MEDICINE

## 2017-03-20 PROCEDURE — 99214 OFFICE O/P EST MOD 30 MIN: CPT | Performed by: FAMILY MEDICINE

## 2017-03-20 PROCEDURE — G8484 FLU IMMUNIZE NO ADMIN: HCPCS | Performed by: FAMILY MEDICINE

## 2017-03-20 RX ORDER — LOSARTAN POTASSIUM 50 MG/1
50 TABLET ORAL DAILY
Qty: 90 TAB | Refills: 3 | Status: SHIPPED | OUTPATIENT
Start: 2017-03-20 | End: 2017-11-14 | Stop reason: SDUPTHER

## 2017-03-20 RX ORDER — OMEPRAZOLE 20 MG/1
20 CAPSULE, DELAYED RELEASE ORAL
Qty: 45 CAP | Refills: 3 | Status: SHIPPED | OUTPATIENT
Start: 2017-03-20 | End: 2017-11-14

## 2017-03-20 RX ORDER — ATORVASTATIN CALCIUM 20 MG/1
20 TABLET, FILM COATED ORAL DAILY
Qty: 90 TAB | Refills: 3 | Status: SHIPPED | OUTPATIENT
Start: 2017-03-20 | End: 2017-11-14 | Stop reason: SDUPTHER

## 2017-03-20 RX ORDER — AMLODIPINE BESYLATE 5 MG/1
5 TABLET ORAL DAILY
Qty: 90 TAB | Refills: 3 | Status: SHIPPED | OUTPATIENT
Start: 2017-03-20 | End: 2017-11-14 | Stop reason: SDUPTHER

## 2017-03-20 NOTE — ASSESSMENT & PLAN NOTE
Ongoing issue. Patient reports 100% compliance with amlodipine, and losartan. Chart review shows that her blood pressure and heart rate both are in the normal range for her age. Patient denies any issues with headache, dizziness, chest pain.

## 2017-03-20 NOTE — PROGRESS NOTES
Subjective:   Rosa M Reeves is a 86 y.o. female here today for elevated Ca+; GERD; HTN; decrease in kidney function    Hypercalcemia  Ongoing issue. The patient stopped her calcium supplements; labs and then rechecked her calcium level is normal.    Gastroesophageal reflux disease without esophagitis  Ongoing issue. Patient reports 100% compliance with omeprazole 20 mg daily. Due to decrease in kidney function I have asked patient to change to every other day dosing.    Essential hypertension  Ongoing issue. Patient reports 100% compliance with amlodipine, and losartan. Chart review shows that her blood pressure and heart rate both are in the normal range for her age. Patient denies any issues with headache, dizziness, chest pain.    CKD (chronic kidney disease) stage 4, GFR 15-29 ml/min (CMS-[facility])  New problem. Patient had repeat blood work due to elevated calcium level. In a one month's time. Patient is a decrease of her GFR from 30 to 21. There is also an increase in her creatinine and potassium level. Given the patient also had an elevated calcium level previously I did check a PTH which is elevated.         Current medicines (including changes today)  Current Outpatient Prescriptions   Medication Sig Dispense Refill   • amlodipine (NORVASC) 5 MG Tab Take 1 Tab by mouth every day. 90 Tab 3   • atorvastatin (LIPITOR) 20 MG Tab Take 1 Tab by mouth every day. 90 Tab 3   • losartan (COZAAR) 50 MG Tab Take 1 Tab by mouth every day. 90 Tab 3   • omeprazole (PRILOSEC) 20 MG delayed-release capsule Take 1 Cap by mouth every 48 hours. 45 Cap 3   • guaifenesin-codeine (ROBITUSSIN AC) Solution oral solution 5-10 ML (1-2 TEASPOONS) EVERY 6 HOURS ONLY IF NEEDED FOR COUGH. MAY CAUSE DROWSINESS. 240 mL 0   • benzonatate (TESSALON) 200 MG capsule Take 1 Cap by mouth 3 times a day as needed for Cough. 30 Cap 0   • aspirin EC (ECOTRIN) 81 MG Tablet Delayed Response Take 1 Tab by mouth every day. 30 Tab 0   • albuterol (VENTOLIN OR  PROVENTIL) 108 (90 BASE) MCG/ACT Aero Soln inhalation aerosol Inhale 2 Puffs by mouth every 6 hours as needed for Shortness of Breath. 8.5 g 0     No current facility-administered medications for this visit.     She  has a past medical history of Hypertension and GERD (gastroesophageal reflux disease).    ROS   No chest pain, no shortness of breath, no abdominal pain       Objective:     Blood pressure 132/74, pulse 70, temperature 36.2 °C (97.2 °F), height 1.524 m (5'), weight 61.689 kg (136 lb), SpO2 93 %. Body mass index is 26.56 kg/(m^2).   Physical Exam:  Alert, oriented in no acute distress.  Eye contact is good, speech goal directed, affect calm  HEENT: conjunctiva non-injected, sclera non-icteric.  Pinna normal. Oral mucous membranes pink and moist with no lesions.  Neck No adenopathy or masses in the neck or supraclavicular regions.  Lungs: clear to auscultation bilaterally with good excursion.  CV: regular rate and rhythm.  Abdomen: soft, nontender, No CVAT  Ext: no edema, color normal, vascularity normal, temperature normal        Assessment and Plan:   The following treatment plan was discussed     1. CKD (chronic kidney disease) stage 4, GFR 15-29 ml/min (CMS-[facility])  REFERRAL TO NEPHROLOGY    Uncontrolled. Significant decline in a short period of time. Urgent referral to nephrology for further evaluation and treatment.   2. Essential hypertension  amlodipine (NORVASC) 5 MG Tab    losartan (COZAAR) 50 MG Tab    Stable. Continue current medications; monitor   3. Hypercalcemia      Resolved. Monitor   4. Gastroesophageal reflux disease without esophagitis      Stable. Change patient to omeprazole 20 mg every other day. Monitor   5. Mixed hyperlipidemia  atorvastatin (LIPITOR) 20 MG Tab    Stable. Continue current medications; refills provided. Monitor   6. Encounter for screening mammogram for breast cancer  MA-SCREEN MAMMO W/CAD-BILAT    Screening mammogram; patient denies any breast issues.   7. Menopause   DS-BONE DENSITY STUDY (DEXA)    DEXA scan ordered; patient denies any long bone or vertebral fractures.   8. Gastroesophageal reflux disease, esophagitis presence not specified  omeprazole (PRILOSEC) 20 MG delayed-release capsule    Repeat diagnosis due to medication refill.       Followup: Return in about 6 months (around 9/20/2017) for HTN/kidney/medication review, Short.

## 2017-03-20 NOTE — ASSESSMENT & PLAN NOTE
Ongoing issue. The patient stopped her calcium supplements; labs and then rechecked her calcium level is normal.

## 2017-03-20 NOTE — ASSESSMENT & PLAN NOTE
Ongoing issue. Patient reports 100% compliance with omeprazole 20 mg daily. Due to decrease in kidney function I have asked patient to change to every other day dosing.

## 2017-03-20 NOTE — MR AVS SNAPSHOT
Rosa M Reeves   3/20/2017 2:20 PM   Office Visit   MRN: [id number]    Department:  [facility]   Dept Phone:  [phone]    Description:  Female : 1930   Provider:  Faith Burr D.O.           Reason for Visit     Follow-Up           Allergies as of 3/20/2017     No Known Allergies      You were diagnosed with     Essential hypertension   [2456432]       Hypercalcemia   [275.42.ICD-9-CM]       CKD (chronic kidney disease) stage 4, GFR 15-29 ml/min (CMS-[facility])   [700819]       Mixed hyperlipidemia   [272.2.ICD-9-CM]       Gastroesophageal reflux disease, esophagitis presence not specified   [6010219]       Encounter for screening mammogram for breast cancer   [3740501]       Menopause   [142636]         Vital Signs     Blood Pressure Pulse Temperature Height Weight Body Mass Index    132/74 mmHg 70 36.2 °C (97.2 °F) 1.524 m (5') 61.689 kg (136 lb) 26.56 kg/m2    Oxygen Saturation Smoking Status                93% Never Smoker           Basic Information     Date Of Birth Sex Race Ethnicity Preferred Language    1930 Female White Non- English      Your appointments     Sep 22, 2017  2:40 PM   Established Patient with Faith Burr D.O.   [address] [id number] [phone]           You will be receiving a confirmation call a few days before your appointment from our automated call confirmation system.              Problem List              ICD-10-CM Priority Class Noted - Resolved    Aphasia R47.01   2016 - Present    Essential hypertension I10   2/10/2016 - Present    Gastroesophageal reflux disease without esophagitis K21.9   2/10/2016 - Present    Mild intermittent asthma without complication J45.20   2/10/2016 - Present    Pure hypercholesterolemia E78.00   2016 - Present    Hypercalcemia E83.52   2017 - Present    Abnormal chest CT R93.8   2017 - Present    CKD (chronic kidney disease) stage 4, GFR  15-29 ml/min (CMS-[facility]) N18.4   3/20/2017 - Present      Health Maintenance        Date Due Completion Dates    IMM DTaP/Tdap/Td Vaccine (1 - Tdap) 5/6/1949 ---    IMM ZOSTER VACCINE 5/6/1990 ---    BONE DENSITY 5/6/1995 ---    IMM INFLUENZA (1) 9/1/2016 11/5/2015    IMM PNEUMOCOCCAL 65+ (ADULT) LOW/MEDIUM RISK SERIES (2 of 2 - PPSV23) 11/5/2016 11/5/2015            Current Immunizations     13-VALENT PCV PREVNAR 11/5/2015    Influenza Vaccine Quad Inj (Pf) 11/5/2015      Below and/or attached are the medications your provider expects you to take. Review all of your home medications and newly ordered medications with your provider and/or pharmacist. Follow medication instructions as directed by your provider and/or pharmacist. Please keep your medication list with you and share with your provider. Update the information when medications are discontinued, doses are changed, or new medications (including over-the-counter products) are added; and carry medication information at all times in the event of emergency situations     Allergies:  No Known Allergies          Medications  Valid as of: March 20, 2017 -  2:35 PM    Generic Name Brand Name Tablet Size Instructions for use    Albuterol Sulfate (Aero Soln) albuterol 108 (90 BASE) MCG/ACT Inhale 2 Puffs by mouth every 6 hours as needed for Shortness of Breath.        AmLODIPine Besylate (Tab) NORVASC 5 MG Take 1 Tab by mouth every day.        Aspirin (Tablet Delayed Response) ECOTRIN 81 MG Take 1 Tab by mouth every day.        Atorvastatin Calcium (Tab) LIPITOR 20 MG Take 1 Tab by mouth every day.        Benzonatate (Cap) TESSALON 200 MG Take 1 Cap by mouth 3 times a day as needed for Cough.        Guaifenesin-Codeine (Solution) ROBITUSSIN -10 mg/5mL 5-10 ML (1-2 TEASPOONS) EVERY 6 HOURS ONLY IF NEEDED FOR COUGH. MAY CAUSE DROWSINESS.        Losartan Potassium (Tab) COZAAR 50 MG Take 1 Tab by mouth every day.        Omeprazole (CAPSULE DELAYED RELEASE) PRILOSEC  20 MG Take 1 Cap by mouth every 48 hours.        .                 Medicines prescribed today were sent to:     [facility] - [place], [place] - [address]    [address] [address]    Phone: [phone] Fax: [phone]    Open 24 Hours?: No      Medication refill instructions:       If your prescription bottle indicates you have medication refills left, it is not necessary to call your provider’s office. Please contact your pharmacy and they will refill your medication.    If your prescription bottle indicates you do not have any refills left, you may request refills at any time through one of the following ways: The online [employer] system (except Urgent Care), by calling your provider’s office, or by asking your pharmacy to contact your provider’s office with a refill request. Medication refills are processed only during regular business hours and may not be available until the next business day. Your provider may request additional information or to have a follow-up visit with you prior to refilling your medication.   *Please Note: Medication refills are assigned a new Rx number when refilled electronically. Your pharmacy may indicate that no refills were authorized even though a new prescription for the same medication is available at the pharmacy. Please request the medicine by name with the pharmacy before contacting your provider for a refill.        Your To Do List     Future Labs/Procedures Complete By Expires    DS-BONE DENSITY STUDY (DEXA)  As directed 9/20/2017    MA-SCREEN MAMMO W/CAD-BILAT  As directed 4/21/2018      Referral     A referral request has been sent to our patient care coordination department. Please allow 3-5 business days for us to process this request and contact you either by phone or mail. If you do not hear from us by the 5th business day, please call us at [phone].           [employer] Access Code: [email or web address]  Expires:  4/7/2017  1:29 PM    Tucoola  A secure, online tool to manage your health information     [employer]’s Tucoola® is a secure, online tool that connects you to your personalized health information from the privacy of your home -- day or night - making it very easy for you to manage your healthcare. Once the activation process is completed, you can even access your medical information using the Tucoola oswaldo, which is available for free in the Apple Oswaldo store or Google Play store.     Tucoola provides the following levels of access (as shown below):   My Chart Features   [facility] Doctor [facility]  Specialists [facility]  Urgent  Care Non-[facility]  Primary Care  Doctor   Email your healthcare team securely and privately 24/7 X X X    Manage appointments: schedule your next appointment; view details of past/upcoming appointments X      Request prescription refills. X      View recent personal medical records, including lab and immunizations X X X X   View health record, including health history, allergies, medications X X X X   Read reports about your outpatient visits, procedures, consult and ER notes X X X X   See your discharge summary, which is a recap of your hospital and/or ER visit that includes your diagnosis, lab results, and care plan. X X       How to register for Tucoola:  1. Go to  [email or web address].  2. Click on the Sign Up Now box, which takes you to the New Member Sign Up page. You will need to provide the following information:  a. Enter your Tucoola Access Code exactly as it appears at the top of this page. (You will not need to use this code after you’ve completed the sign-up process. If you do not sign up before the expiration date, you must request a new code.)   b. Enter your date of birth.   c. Enter your home email address.   d. Click Submit, and follow the next screen’s instructions.  3. Create a Tucoola ID. This will be your Tucoola login ID and cannot be changed, so think of one  that is secure and easy to remember.  4. Create a [email or web address] password. You can change your password at any time.  5. Enter your Password Reset Question and Answer. This can be used at a later time if you forget your password.   6. Enter your e-mail address. This allows you to receive e-mail notifications when new information is available in [email or web address].  7. Click Sign Up. You can now view your health information.    For assistance activating your [email or web address] account, call [phone]

## 2017-03-20 NOTE — ASSESSMENT & PLAN NOTE
New problem. Patient had repeat blood work due to elevated calcium level. In a one month's time. Patient is a decrease of her GFR from 30 to 21. There is also an increase in her creatinine and potassium level. Given the patient also had an elevated calcium level previously I did check a PTH which is elevated.

## 2017-03-24 ENCOUNTER — OFFICE VISIT (OUTPATIENT)
Dept: NEPHROLOGY | Facility: MEDICAL CENTER | Age: 82
End: 2017-03-24
Payer: COMMERCIAL

## 2017-03-24 VITALS
HEART RATE: 70 BPM | RESPIRATION RATE: 12 BRPM | SYSTOLIC BLOOD PRESSURE: 136 MMHG | WEIGHT: 136 LBS | HEIGHT: 60 IN | DIASTOLIC BLOOD PRESSURE: 76 MMHG | BODY MASS INDEX: 26.7 KG/M2 | TEMPERATURE: 98 F

## 2017-03-24 DIAGNOSIS — E21.3 HYPERPARATHYROIDISM (HCC): ICD-10-CM

## 2017-03-24 DIAGNOSIS — I10 ESSENTIAL HYPERTENSION: ICD-10-CM

## 2017-03-24 DIAGNOSIS — E83.52 HYPERCALCEMIA: ICD-10-CM

## 2017-03-24 DIAGNOSIS — N17.9 AKI (ACUTE KIDNEY INJURY) (HCC): ICD-10-CM

## 2017-03-24 PROCEDURE — 1036F TOBACCO NON-USER: CPT | Performed by: INTERNAL MEDICINE

## 2017-03-24 PROCEDURE — 99204 OFFICE O/P NEW MOD 45 MIN: CPT | Performed by: INTERNAL MEDICINE

## 2017-03-24 PROCEDURE — G8419 CALC BMI OUT NRM PARAM NOF/U: HCPCS | Performed by: INTERNAL MEDICINE

## 2017-03-24 PROCEDURE — G8484 FLU IMMUNIZE NO ADMIN: HCPCS | Performed by: INTERNAL MEDICINE

## 2017-03-24 PROCEDURE — 4040F PNEUMOC VAC/ADMIN/RCVD: CPT | Performed by: INTERNAL MEDICINE

## 2017-03-24 PROCEDURE — 1101F PT FALLS ASSESS-DOCD LE1/YR: CPT | Performed by: INTERNAL MEDICINE

## 2017-03-24 PROCEDURE — G8432 DEP SCR NOT DOC, RNG: HCPCS | Performed by: INTERNAL MEDICINE

## 2017-03-24 ASSESSMENT — ENCOUNTER SYMPTOMS
CHILLS: 0
FEVER: 0
PALPITATIONS: 0

## 2017-03-24 NOTE — PROGRESS NOTES
Subjective:      Rosa M Reeves is a 86 y.o. female who presents with BAEU/Hypercalcemia New Patient            HPI     86 year old with Serum Cr that seems to be around 1.3, and marked elevation of Cr to 1.6 on 2/18 and then to 2.21 on 3/14. The patient was noted to have marked hypercalcemia of 13.4 on 2/18, she does note she was taking calcium daily, approx 1200mg she recalls, but stopped this.   No kidney disease that runs in family, no smoking. No problems urinating. Drinks adequate fluids by history. No bony pain.      Review of Systems   Constitutional: Negative for fever and chills.   Cardiovascular: Negative for chest pain and palpitations.          Objective:     /76 mmHg  Pulse 70  Temp(Src) 36.7 °C (98 °F) (Temporal)  Resp 12  Ht 1.524 m (5')  Wt 61.689 kg (136 lb)  BMI 26.56 kg/m2     Physical Exam   Constitutional: She is oriented to person, place, and time. She appears well-developed and well-nourished.   Neck:   No neck masses   Cardiovascular: Normal rate and regular rhythm.    Pulmonary/Chest: Effort normal and breath sounds normal.   Musculoskeletal: She exhibits no edema or tenderness.   Neurological: She is alert and oriented to person, place, and time.   Skin: Skin is warm and dry.   Psychiatric: She has a normal mood and affect. Her behavior is normal.               Assessment/Plan:     1. Hypercalcemia  Strong suspicion for primary hyperparathyroidism. Check PTH, 24 hour urine for calcium. Will get sestimibi. Will check SPEP as well given anemia, age. Ca still quite elevated and PTH above level expected for level of GFR.    2. Hyperparathyroidism (CMS-[facility])  Suspicion for primary hyperparathyoidism. Refer to surgery once dx made.    3. Essential hypertension  BP at goal, controlled. Would hold on cozaar for now given BEAU.    4. BEAU  Likely due to hypercalcemia. Expect slow improvement now that calcium is improved.    PLAN  1. CKD labs, 24 hour urine for Ca, SPEP  2. NM Sestimibi for  parathyroid adenoma  3. Stop Cozaar for now, BEAU  4. Return shortly for follow up

## 2017-03-24 NOTE — MR AVS SNAPSHOT
Rosa M Reeves   3/24/2017 2:00 PM   Office Visit   MRN: [id number]    Department:  [facility]   Dept Phone:  [phone]    Description:  Female : 1930   Provider:  Norberto Butt M.D.           Reason for Visit     New Patient           Allergies as of 3/24/2017     No Known Allergies      You were diagnosed with     Hypercalcemia   [275.42.ICD-9-CM]       Hyperparathyroidism (CMS-[facility])   [6643130]       Essential hypertension   [5474592]       BEAU (acute kidney injury) (CMS-[facility])   [432820]         Vital Signs     Blood Pressure Pulse Temperature Respirations Height Weight    136/76 mmHg 70 36.7 °C (98 °F) (Temporal) 12 1.524 m (5') 61.689 kg (136 lb)    Body Mass Index Smoking Status                26.56 kg/m2 Never Smoker           Basic Information     Date Of Birth Sex Race Ethnicity Preferred Language    1930 Female White Non- English      Your appointments     May 12, 2017  3:00 PM   Follow Up Visit with Norberto Butt M.D.   [facility] ([facility])    [facility] [address]  Formerly [facility] [address] [phone]           You will be receiving a confirmation call a few days before your appointment from our automated call confirmation system.            Sep 22, 2017  2:40 PM   [facility]    [address] [place] [address] [phone]           You will be receiving a confirmation call a few days before your appointment from our automated call confirmation system.              Problem List              ICD-10-CM Priority Class Noted - Resolved    Aphasia R47.01   2016 - Present    Essential hypertension I10   2/10/2016 - Present    Gastroesophageal reflux disease without esophagitis K21.9   2/10/2016 - Present    Mild intermittent asthma without complication J45.20   2/10/2016 - Present    Pure hypercholesterolemia E78.00   2016 - Present      Hypercalcemia E83.52   2/21/2017 - Present    Abnormal chest CT R93.8   2/21/2017 - Present    CKD (chronic kidney disease) stage 4, GFR 15-29 ml/min (CMS-HCC) N18.4   3/20/2017 - Present    Hyperparathyroidism (CMS-HCC) E21.3   3/24/2017 - Present    BEAU (acute kidney injury) (CMS-HCC) N17.9   3/24/2017 - Present      Health Maintenance        Date Due Completion Dates    IMM DTaP/Tdap/Td Vaccine (1 - Tdap) 5/6/1949 ---    IMM ZOSTER VACCINE 5/6/1990 ---    BONE DENSITY 5/6/1995 ---    IMM PNEUMOCOCCAL 65+ (ADULT) HIGH/HIGHEST RISK SERIES (2 of 2 - PPSV23) 12/31/2015 11/5/2015    IMM INFLUENZA (1) 9/1/2016 11/5/2015            Current Immunizations     13-VALENT PCV PREVNAR 11/5/2015    Influenza Vaccine Quad Inj (Pf) 11/5/2015      Below and/or attached are the medications your provider expects you to take. Review all of your home medications and newly ordered medications with your provider and/or pharmacist. Follow medication instructions as directed by your provider and/or pharmacist. Please keep your medication list with you and share with your provider. Update the information when medications are discontinued, doses are changed, or new medications (including over-the-counter products) are added; and carry medication information at all times in the event of emergency situations     Allergies:  No Known Allergies          Medications  Valid as of: March 24, 2017 -  2:34 PM    Generic Name Brand Name Tablet Size Instructions for use    Albuterol Sulfate (Aero Soln) albuterol 108 (90 BASE) MCG/ACT Inhale 2 Puffs by mouth every 6 hours as needed for Shortness of Breath.        AmLODIPine Besylate (Tab) NORVASC 5 MG Take 1 Tab by mouth every day.        Aspirin (Tablet Delayed Response) ECOTRIN 81 MG Take 1 Tab by mouth every day.        Atorvastatin Calcium (Tab) LIPITOR 20 MG Take 1 Tab by mouth every day.        Benzonatate (Cap) TESSALON 200 MG Take 1 Cap by mouth 3 times a day as needed for Cough.         Guaifenesin-Codeine (Solution) ROBITUSSIN -10 mg/5mL 5-10 ML (1-2 TEASPOONS) EVERY 6 HOURS ONLY IF NEEDED FOR COUGH. MAY CAUSE DROWSINESS.        Losartan Potassium (Tab) COZAAR 50 MG Take 1 Tab by mouth every day.        Omeprazole (CAPSULE DELAYED RELEASE) PRILOSEC 20 MG Take 1 Cap by mouth every 48 hours.        .                 Medicines prescribed today were sent to:     [facility] - [place], [place] - [address] [facility]    [address] [facility] [facility] [address]    Phone: [phone] Fax: [phone]    Open 24 Hours?: No      Medication refill instructions:       If your prescription bottle indicates you have medication refills left, it is not necessary to call your provider’s office. Please contact your pharmacy and they will refill your medication.    If your prescription bottle indicates you do not have any refills left, you may request refills at any time through one of the following ways: The online [employer] system (except Urgent Care), by calling your provider’s office, or by asking your pharmacy to contact your provider’s office with a refill request. Medication refills are processed only during regular business hours and may not be available until the next business day. Your provider may request additional information or to have a follow-up visit with you prior to refilling your medication.   *Please Note: Medication refills are assigned a new Rx number when refilled electronically. Your pharmacy may indicate that no refills were authorized even though a new prescription for the same medication is available at the pharmacy. Please request the medicine by name with the pharmacy before contacting your provider for a refill.        Your To Do List     Future Labs/Procedures Complete By Expires    BASIC METABOLIC PANEL  As directed 9/21/2017    CBC WITHOUT DIFFERENTIAL  As directed 9/21/2017    CREATININE CLEARANCE  As directed 9/21/2017    NM-PARATHYROID (SESTAMIBI)  SCAN  As directed 3/24/2018    PTH INTACT (PTH ONLY)  As directed 3/25/2018    SERUM PROTEIN ELECTROPHORESIS  As directed 3/25/2018    URINE CALCIUM 24 HR  As directed 3/25/2018    URINE CREATININE 24 HR  As directed 3/25/2018    VITAMIN D,25 HYDROXY  As directed 9/24/2017         "[employer] Access Code: [email or web address]  Expires: 4/7/2017  1:29 PM    "[employer]  A secure, online tool to manage your health information     [employer]’s "[employer]® is a secure, online tool that connects you to your personalized health information from the privacy of your home -- day or night - making it very easy for you to manage your healthcare. Once the activation process is completed, you can even access your medical information using the "[employer] oswaldo, which is available for free in the Apple Oswaldo store or Google Play store.     "[employer] provides the following levels of access (as shown below):   My Chart Features   [facility]  Specialists [facility]  Urgent  Care Non-[facility]  Primary Care  Doctor   Email your healthcare team securely and privately 24/7 X X X    Manage appointments: schedule your next appointment; view details of past/upcoming appointments X      Request prescription refills. X      View recent personal medical records, including lab and immunizations X X X X   View health record, including health history, allergies, medications X X X X   Read reports about your outpatient visits, procedures, consult and ER notes X X X X   See your discharge summary, which is a recap of your hospital and/or ER visit that includes your diagnosis, lab results, and care plan. X X       How to register for "[employer]:  1. Go to  [email or web address].  2. Click on the Sign Up Now box, which takes you to the New Member Sign Up page. You will need to provide the following information:  a. Enter your "[employer] Access Code exactly as it appears at the top of this page. (You will not need to use this code after you’ve completed  the sign-up process. If you do not sign up before the expiration date, you must request a new code.)   b. Enter your date of birth.   c. Enter your home email address.   d. Click Submit, and follow the next screen’s instructions.  3. Create a [employer] ID. This will be your [employer] login ID and cannot be changed, so think of one that is secure and easy to remember.  4. Create a [employer] password. You can change your password at any time.  5. Enter your Password Reset Question and Answer. This can be used at a later time if you forget your password.   6. Enter your e-mail address. This allows you to receive e-mail notifications when new information is available in [employer].  7. Click Sign Up. You can now view your health information.    For assistance activating your [employer] account, call [phone]

## 2017-03-27 ENCOUNTER — HOSPITAL ENCOUNTER (OUTPATIENT)
Dept: LAB | Facility: MEDICAL CENTER | Age: 82
End: 2017-03-27
Attending: INTERNAL MEDICINE
Payer: COMMERCIAL

## 2017-03-27 DIAGNOSIS — N17.9 AKI (ACUTE KIDNEY INJURY) (HCC): ICD-10-CM

## 2017-03-27 LAB
25(OH)D3 SERPL-MCNC: 35 NG/ML (ref 30–100)
ANION GAP SERPL CALC-SCNC: 9 MMOL/L (ref 0–11.9)
BUN SERPL-MCNC: 32 MG/DL (ref 8–22)
CALCIUM SERPL-MCNC: 10.5 MG/DL (ref 8.5–10.5)
CHLORIDE SERPL-SCNC: 103 MMOL/L (ref 96–112)
CO2 SERPL-SCNC: 26 MMOL/L (ref 20–33)
CREAT SERPL-MCNC: 1.47 MG/DL (ref 0.5–1.4)
ERYTHROCYTE [DISTWIDTH] IN BLOOD BY AUTOMATED COUNT: 49.1 FL (ref 35.9–50)
GLUCOSE SERPL-MCNC: 112 MG/DL (ref 65–99)
HCT VFR BLD AUTO: 39.4 % (ref 37–47)
HGB BLD-MCNC: 12.5 G/DL (ref 12–16)
MCH RBC QN AUTO: 31.2 PG (ref 27–33)
MCHC RBC AUTO-ENTMCNC: 31.7 G/DL (ref 33.6–35)
MCV RBC AUTO: 98.3 FL (ref 81.4–97.8)
PLATELET # BLD AUTO: 172 K/UL (ref 164–446)
PMV BLD AUTO: 11.6 FL (ref 9–12.9)
POTASSIUM SERPL-SCNC: 4.3 MMOL/L (ref 3.6–5.5)
PTH-INTACT SERPL-MCNC: 148.9 PG/ML (ref 14–72)
RBC # BLD AUTO: 4.01 M/UL (ref 4.2–5.4)
SODIUM SERPL-SCNC: 138 MMOL/L (ref 135–145)
WBC # BLD AUTO: 2.9 K/UL (ref 4.8–10.8)

## 2017-03-27 PROCEDURE — 85027 COMPLETE CBC AUTOMATED: CPT

## 2017-03-27 PROCEDURE — 82306 VITAMIN D 25 HYDROXY: CPT | Mod: GZ

## 2017-03-27 PROCEDURE — 83970 ASSAY OF PARATHORMONE: CPT

## 2017-03-27 PROCEDURE — 84165 PROTEIN E-PHORESIS SERUM: CPT

## 2017-03-27 PROCEDURE — 84160 ASSAY OF PROTEIN ANY SOURCE: CPT

## 2017-03-27 PROCEDURE — 80048 BASIC METABOLIC PNL TOTAL CA: CPT

## 2017-03-27 PROCEDURE — 36415 COLL VENOUS BLD VENIPUNCTURE: CPT

## 2017-03-29 ENCOUNTER — HOSPITAL ENCOUNTER (OUTPATIENT)
Facility: MEDICAL CENTER | Age: 82
End: 2017-03-29
Attending: INTERNAL MEDICINE
Payer: COMMERCIAL

## 2017-03-29 DIAGNOSIS — N17.9 AKI (ACUTE KIDNEY INJURY) (HCC): ICD-10-CM

## 2017-03-29 LAB
COLLECTION PERIOD 8234: 24 HR
CREAT CL/1.73 SQ M ?TM UR+SERPL-ARVRAT: 382 ML/MIN (ref 88–128)
CREAT SERPL-MCNC: 1.51 MG/DL (ref 0.5–1.4)
CREAT UR-MCNC: 53.2 MG/DL
CREAT UR-MCNC: 54 MG/DL
UR CREAT CLEARANCE HEIGHT 208: 5 CM
UR CREAT CLEARANCE WEIGHT 210: 125 KG
UR CREATININE 24 HR 1034: 851 MG/24 HR (ref 800–1800)
URINE CREATININE EXCRETED 1125: 864 MG/24 HR
URINE VOLUMNE 8233: 1600 ML
URINE VOLUMNE 8233: 1600 ML

## 2017-03-29 PROCEDURE — 81050 URINALYSIS VOLUME MEASURE: CPT

## 2017-03-29 PROCEDURE — 36415 COLL VENOUS BLD VENIPUNCTURE: CPT

## 2017-03-29 PROCEDURE — 82570 ASSAY OF URINE CREATININE: CPT

## 2017-03-29 PROCEDURE — 82340 ASSAY OF CALCIUM IN URINE: CPT

## 2017-03-29 PROCEDURE — 82575 CREATININE CLEARANCE TEST: CPT

## 2017-03-30 LAB
ALBUMIN SERPL-MCNC: 4.71 G/DL (ref 3.75–5.01)
ALPHA1 GLOB SERPL ELPH-MCNC: 0.3 G/DL (ref 0.19–0.46)
ALPHA2 GLOB SERPL ELPH-MCNC: 0.85 G/DL (ref 0.48–1.05)
B-GLOBULIN SERPL ELPH-MCNC: 0.75 G/DL (ref 0.48–1.1)
EER PROT ELECT SER Q1092: NORMAL
GAMMA GLOB SERPL ELPH-MCNC: 0.78 G/DL (ref 0.62–1.51)
INTERPRETATION SERPL IFE-IMP: NORMAL
PROT SERPL-MCNC: 7.4 G/DL (ref 6–8.3)

## 2017-03-31 LAB
CALCIUM 24H UR-MCNC: 3.4 MG/DL
CALCIUM 24H UR-MRATE: 54 MG/D
CALCIUM/CREAT 24H UR: 77 MG/G (ref 20–300)
CREAT 24H UR-MCNC: 44 MG/DL
CREAT 24H UR-MRATE: 704 MG/D (ref 400–1300)
SPECIMEN VOL ?TM UR: 1600 ML

## 2017-04-19 ENCOUNTER — HOSPITAL ENCOUNTER (OUTPATIENT)
Dept: RADIOLOGY | Facility: MEDICAL CENTER | Age: 82
End: 2017-04-19
Attending: INTERNAL MEDICINE
Payer: COMMERCIAL

## 2017-04-19 DIAGNOSIS — E83.52 HYPERCALCEMIA: ICD-10-CM

## 2017-04-19 PROCEDURE — A9500 TC99M SESTAMIBI: HCPCS

## 2017-04-27 ENCOUNTER — PATIENT OUTREACH (OUTPATIENT)
Dept: HEALTH INFORMATION MANAGEMENT | Facility: OTHER | Age: 82
End: 2017-04-27

## 2017-04-27 NOTE — PROGRESS NOTES
Attempt #:1    WebIZ Checked & Epic Updated: yes  HealthConnect Verified: no  Verify PCP: yes    Communication Preference Obtained: yes     Review Care Team: yes    Annual Wellness Visit Scheduling  1. Scheduling Status:Scheduled        Care Gap Scheduling (Attempt to Schedule EACH Overdue Care Gap!)     Health Maintenance Due   Topic Date Due   • IMM DTaP/Tdap/Td Vaccine (1 - Tdap) SCHEDULED   • BONE DENSITY  PT WILL SCHEDULE         MyChart Activation: declined  MyChart Oswaldo: no  Virtual Visits: no  Opt In to Text Messages: no

## 2017-05-12 ENCOUNTER — APPOINTMENT (OUTPATIENT)
Dept: NEPHROLOGY | Facility: MEDICAL CENTER | Age: 82
End: 2017-05-12
Payer: COMMERCIAL

## 2017-09-22 ENCOUNTER — OFFICE VISIT (OUTPATIENT)
Dept: MEDICAL GROUP | Facility: PHYSICIAN GROUP | Age: 82
End: 2017-09-22
Payer: COMMERCIAL

## 2017-09-22 VITALS
SYSTOLIC BLOOD PRESSURE: 118 MMHG | BODY MASS INDEX: 26.5 KG/M2 | WEIGHT: 135 LBS | HEART RATE: 70 BPM | DIASTOLIC BLOOD PRESSURE: 60 MMHG | HEIGHT: 60 IN | OXYGEN SATURATION: 95 % | TEMPERATURE: 98.1 F

## 2017-09-22 DIAGNOSIS — K21.9 GASTROESOPHAGEAL REFLUX DISEASE WITHOUT ESOPHAGITIS: ICD-10-CM

## 2017-09-22 DIAGNOSIS — Z23 NEED FOR VACCINATION: ICD-10-CM

## 2017-09-22 DIAGNOSIS — N18.30 CKD (CHRONIC KIDNEY DISEASE) STAGE 3, GFR 30-59 ML/MIN (HCC): ICD-10-CM

## 2017-09-22 DIAGNOSIS — I10 ESSENTIAL HYPERTENSION: ICD-10-CM

## 2017-09-22 PROBLEM — N18.4 CKD (CHRONIC KIDNEY DISEASE) STAGE 4, GFR 15-29 ML/MIN (HCC): Status: RESOLVED | Noted: 2017-03-20 | Resolved: 2017-09-22

## 2017-09-22 PROCEDURE — 99214 OFFICE O/P EST MOD 30 MIN: CPT | Mod: 25 | Performed by: FAMILY MEDICINE

## 2017-09-22 PROCEDURE — 90471 IMMUNIZATION ADMIN: CPT | Performed by: FAMILY MEDICINE

## 2017-09-22 PROCEDURE — 90732 PPSV23 VACC 2 YRS+ SUBQ/IM: CPT | Performed by: FAMILY MEDICINE

## 2017-09-22 RX ORDER — FAMOTIDINE 40 MG/1
40 TABLET, FILM COATED ORAL DAILY
Qty: 90 TAB | Refills: 1 | Status: SHIPPED | OUTPATIENT
Start: 2017-09-22 | End: 2017-11-14 | Stop reason: SDUPTHER

## 2017-09-22 ASSESSMENT — PATIENT HEALTH QUESTIONNAIRE - PHQ9: CLINICAL INTERPRETATION OF PHQ2 SCORE: 0

## 2017-09-23 NOTE — ASSESSMENT & PLAN NOTE
Ongoing issue. Patient reports 100% compliance with amlodipine 5 mg and losartan 50 mg daily. Patient denies any issues with leg swelling, headache, dizziness, chest pain. Chart review shows a blood pressure and heart rate both are in the normal range.

## 2017-09-23 NOTE — PROGRESS NOTES
Subjective:   Rosa M Reeves is a 87 y.o. female here today for Lab review, reflux, hypertension    Gastroesophageal reflux disease without esophagitis  Ongoing issue. Patient reports that she was taking her omeprazole 20 mg every other day. She states though unfortunately this is not helping to control her symptoms. Review of her lab work shows that her GFR is at 34 which is an improvement but still depressed. Medication was changed due to her decreasing kidney function. Discussion today on changing her medication.    Essential hypertension  Ongoing issue. Patient reports 100% compliance with amlodipine 5 mg and losartan 50 mg daily. Patient denies any issues with leg swelling, headache, dizziness, chest pain. Chart review shows a blood pressure and heart rate both are in the normal range.    CKD (chronic kidney disease) stage 3, GFR 30-59 ml/min  Ongoing issue. Patient does have a decrease in her kidney function but it has slightly improved. She has been seen by nephrology and they report that all of her numbers are within an acceptable range and no further intervention is required by them.    As noted patient is on omeprazole and has been taking this medication for greater than 10 years.         Current medicines (including changes today)  Current Outpatient Prescriptions   Medication Sig Dispense Refill   • famotidine (PEPCID) 40 MG Tab Take 1 Tab by mouth every day. 90 Tab 1   • amlodipine (NORVASC) 5 MG Tab Take 1 Tab by mouth every day. 90 Tab 3   • atorvastatin (LIPITOR) 20 MG Tab Take 1 Tab by mouth every day. 90 Tab 3   • losartan (COZAAR) 50 MG Tab Take 1 Tab by mouth every day. 90 Tab 3   • omeprazole (PRILOSEC) 20 MG delayed-release capsule Take 1 Cap by mouth every 48 hours. 45 Cap 3   • aspirin EC (ECOTRIN) 81 MG Tablet Delayed Response Take 1 Tab by mouth every day. 30 Tab 0   • albuterol (VENTOLIN OR PROVENTIL) 108 (90 BASE) MCG/ACT Aero Soln inhalation aerosol Inhale 2 Puffs by mouth every 6 hours as  needed for Shortness of Breath. 8.5 g 0     No current facility-administered medications for this visit.      She  has a past medical history of GERD (gastroesophageal reflux disease) and Hypertension.    ROS   No chest pain, no shortness of breath, no abdominal pain       Objective:     Blood pressure 118/60, pulse 70, temperature 36.7 °C (98.1 °F), height 1.524 m (5'), weight 61.2 kg (135 lb), SpO2 95 %. Body mass index is 26.37 kg/m².   Physical Exam:  Alert, oriented in no acute distress.  Eye contact is good, speech goal directed, affect calm  HEENT: conjunctiva non-injected, sclera non-icteric.  Pinna normal. Oral mucous membranes pink and moist with no lesions.  Neck No adenopathy or masses in the neck or supraclavicular regions.  Lungs: clear to auscultation bilaterally with good excursion.  CV: regular rate and rhythm.Moderate (5/6) systolic ejection murmur appreciated  Abdomen: soft, nontender, No CVAT  Ext: no edema, color normal, vascularity normal, temperature normal        Assessment and Plan:   The following treatment plan was discussed     1. Gastroesophageal reflux disease without esophagitis      Uncontrolled. Stop omeprazole; start Pepcid 40 mg one tab by mouth daily; reevaluate in one month   2. CKD (chronic kidney disease) stage 3, GFR 30-59 ml/min      Improved; monitor   3. Essential hypertension      Stable. Continue current medications; monitor   4. Need for vaccination  PNEUMOCOCCAL POLYSACCHARIDE VACCINE 23-VALENT =>3YO SQ/IM    Age appropriate immunization (pneumococcal 23) provided; patient tolerated procedure well.       Followup: Return in about 4 weeks (around 10/20/2017) for GERD medication, Short.

## 2017-09-23 NOTE — ASSESSMENT & PLAN NOTE
Ongoing issue. Patient does have a decrease in her kidney function but it has slightly improved. She has been seen by nephrology and they report that all of her numbers are within an acceptable range and no further intervention is required by them.    As noted patient is on omeprazole and has been taking this medication for greater than 10 years.

## 2017-11-14 ENCOUNTER — OFFICE VISIT (OUTPATIENT)
Dept: MEDICAL GROUP | Facility: PHYSICIAN GROUP | Age: 82
End: 2017-11-14
Payer: COMMERCIAL

## 2017-11-14 VITALS
OXYGEN SATURATION: 96 % | HEIGHT: 60 IN | SYSTOLIC BLOOD PRESSURE: 134 MMHG | HEART RATE: 76 BPM | TEMPERATURE: 98.6 F | BODY MASS INDEX: 26.5 KG/M2 | DIASTOLIC BLOOD PRESSURE: 82 MMHG | WEIGHT: 135 LBS

## 2017-11-14 DIAGNOSIS — K21.9 GASTROESOPHAGEAL REFLUX DISEASE WITHOUT ESOPHAGITIS: ICD-10-CM

## 2017-11-14 DIAGNOSIS — N30.00 ACUTE CYSTITIS WITHOUT HEMATURIA: ICD-10-CM

## 2017-11-14 DIAGNOSIS — R79.89 LOW SERUM VITAMIN D: ICD-10-CM

## 2017-11-14 DIAGNOSIS — E78.00 PURE HYPERCHOLESTEROLEMIA: ICD-10-CM

## 2017-11-14 DIAGNOSIS — I10 ESSENTIAL HYPERTENSION: ICD-10-CM

## 2017-11-14 DIAGNOSIS — R30.0 DYSURIA: ICD-10-CM

## 2017-11-14 DIAGNOSIS — N18.30 CKD (CHRONIC KIDNEY DISEASE) STAGE 3, GFR 30-59 ML/MIN (HCC): ICD-10-CM

## 2017-11-14 LAB
APPEARANCE UR: NORMAL
BILIRUB UR STRIP-MCNC: NORMAL MG/DL
COLOR UR AUTO: YELLOW
GLUCOSE UR STRIP.AUTO-MCNC: NORMAL MG/DL
KETONES UR STRIP.AUTO-MCNC: NORMAL MG/DL
LEUKOCYTE ESTERASE UR QL STRIP.AUTO: NORMAL
NITRITE UR QL STRIP.AUTO: POSITIVE
PH UR STRIP.AUTO: 6 [PH] (ref 5–8)
PROT UR QL STRIP: NORMAL MG/DL
RBC UR QL AUTO: NORMAL
SP GR UR STRIP.AUTO: 1.01
UROBILINOGEN UR STRIP-MCNC: NORMAL MG/DL

## 2017-11-14 PROCEDURE — 81002 URINALYSIS NONAUTO W/O SCOPE: CPT | Performed by: FAMILY MEDICINE

## 2017-11-14 PROCEDURE — 99214 OFFICE O/P EST MOD 30 MIN: CPT | Performed by: FAMILY MEDICINE

## 2017-11-14 RX ORDER — LOSARTAN POTASSIUM 50 MG/1
50 TABLET ORAL DAILY
Qty: 90 TAB | Refills: 3 | Status: SHIPPED | OUTPATIENT
Start: 2017-11-14 | End: 2018-10-16 | Stop reason: SDUPTHER

## 2017-11-14 RX ORDER — AMLODIPINE BESYLATE 5 MG/1
5 TABLET ORAL DAILY
Qty: 90 TAB | Refills: 3 | Status: SHIPPED | OUTPATIENT
Start: 2017-11-14 | End: 2018-10-16 | Stop reason: SDUPTHER

## 2017-11-14 RX ORDER — CIPROFLOXACIN 500 MG/1
500 TABLET, FILM COATED ORAL 2 TIMES DAILY
Qty: 20 TAB | Refills: 0 | Status: SHIPPED | OUTPATIENT
Start: 2017-11-14 | End: 2018-05-15

## 2017-11-14 RX ORDER — ATORVASTATIN CALCIUM 20 MG/1
20 TABLET, FILM COATED ORAL DAILY
Qty: 90 TAB | Refills: 3 | Status: SHIPPED | OUTPATIENT
Start: 2017-11-14 | End: 2018-10-16 | Stop reason: SDUPTHER

## 2017-11-14 RX ORDER — FAMOTIDINE 40 MG/1
40 TABLET, FILM COATED ORAL DAILY
Qty: 90 TAB | Refills: 3 | Status: SHIPPED | OUTPATIENT
Start: 2017-11-14 | End: 2018-10-16 | Stop reason: SDUPTHER

## 2017-11-14 NOTE — ASSESSMENT & PLAN NOTE
Ongoing issue. Patient reports that she currently does not take any supplemental vitamin D. To review shows the patient's vitamin D level was a 3 5 which is more recommended range of 40.

## 2017-11-14 NOTE — ASSESSMENT & PLAN NOTE
Ongoing issue. Patient reports compliance with amlodipine 5 mg and losartan 50 mg. She denies any issues with leg swelling, headache, dizziness, chest pain. Chart review shows a blood pressure and heart rate both are in a normal range for her age

## 2017-11-14 NOTE — ASSESSMENT & PLAN NOTE
New problem. Patient reports that she started having burning with urination approximately 3 days ago. She reports it is getting progressively worse. She denies any fever, chills, suprapubic pain, gross hematuria.

## 2017-11-14 NOTE — ASSESSMENT & PLAN NOTE
Ongoing issue. Patient reports compliance with Lipitor 20 mg daily; she denies any muscle cramps or weakness. Previous lab work shows a normal lipid panel.

## 2017-11-14 NOTE — ASSESSMENT & PLAN NOTE
Ongoing issue. Patient reports that she is taking Pepcid 40 mg daily for reflux symptoms. It helps for the most part but occasionally she does seem to take over-the-counter Rolaids to help with symptom management. She denies any issues when she is lying flat; she denies any cough.

## 2017-11-14 NOTE — PROGRESS NOTES
Subjective:   Rosa M Reeves is a 87 y.o. female here today for Hypertension, hyperlipidemia, reflux, dysuria    Pure hypercholesterolemia  Ongoing issue. Patient reports compliance with Lipitor 20 mg daily; she denies any muscle cramps or weakness. Previous lab work shows a normal lipid panel.    Low serum vitamin D  Ongoing issue. Patient reports that she currently does not take any supplemental vitamin D. To review shows the patient's vitamin D level was a 3 5 which is more recommended range of 40.    Gastroesophageal reflux disease without esophagitis  Ongoing issue. Patient reports that she is taking Pepcid 40 mg daily for reflux symptoms. It helps for the most part but occasionally she does seem to take over-the-counter Rolaids to help with symptom management. She denies any issues when she is lying flat; she denies any cough.    Essential hypertension  Ongoing issue. Patient reports compliance with amlodipine 5 mg and losartan 50 mg. She denies any issues with leg swelling, headache, dizziness, chest pain. Chart review shows a blood pressure and heart rate both are in a normal range for her age    Dysuria  New problem. Patient reports that she started having burning with urination approximately 3 days ago. She reports it is getting progressively worse. She denies any fever, chills, suprapubic pain, gross hematuria.    CKD (chronic kidney disease) stage 3, GFR 30-59 ml/min  Ongoing issue. Due to this problem, we have stopped the omeprazole.         Current medicines (including changes today)  Current Outpatient Prescriptions   Medication Sig Dispense Refill   • ciprofloxacin (CIPRO) 500 MG Tab Take 1 Tab by mouth 2 times a day. 20 Tab 0   • amlodipine (NORVASC) 5 MG Tab Take 1 Tab by mouth every day. 90 Tab 3   • atorvastatin (LIPITOR) 20 MG Tab Take 1 Tab by mouth every day. 90 Tab 3   • famotidine (PEPCID) 40 MG Tab Take 1 Tab by mouth every day. 90 Tab 3   • losartan (COZAAR) 50 MG Tab Take 1 Tab by mouth  every day. 90 Tab 3   • aspirin EC (ECOTRIN) 81 MG Tablet Delayed Response Take 1 Tab by mouth every day. 30 Tab 0   • albuterol (VENTOLIN OR PROVENTIL) 108 (90 BASE) MCG/ACT Aero Soln inhalation aerosol Inhale 2 Puffs by mouth every 6 hours as needed for Shortness of Breath. 8.5 g 0     No current facility-administered medications for this visit.      She  has a past medical history of GERD (gastroesophageal reflux disease) and Hypertension.    ROS   No chest pain, no shortness of breath, no abdominal pain  +Pain with urination     Objective:     Blood pressure 134/82, pulse 76, temperature 37 °C (98.6 °F), height 1.524 m (5'), weight 61.2 kg (135 lb), SpO2 96 %. Body mass index is 26.37 kg/m².   Physical Exam:  Alert, oriented in no acute distress.  Eye contact is good, speech goal directed, affect calm  HEENT: conjunctiva non-injected, sclera non-icteric.  Pinna normal. TM pearly gray.   Oral mucous membranes pink and moist with no lesions.  Neck No adenopathy or masses in the neck or supraclavicular regions.  Lungs: clear to auscultation bilaterally with good excursion.  CV: regular rate and rhythm.  Abdomen: soft, nontender, No CVAT  Ext: no edema, color normal, vascularity normal, temperature normal  Neuro: Cranial nerves II through XII grossly intact      Assessment and Plan:   The following treatment plan was discussed     1. Acute cystitis without hematuria  URINE CULTURE(NEW)    Uncontrolled; prescription for Cipro 500 mg one tab by mouth twice a day ×10 days; send urine off for culture   2. Dysuria  POCT Urinalysis    Uncontrolled; symptoms secondary to problem #1   3. CKD (chronic kidney disease) stage 3, GFR 30-59 ml/min      Improved. Repeat lab work and monitor for results   4. Gastroesophageal reflux disease without esophagitis      Stable. Continue current medication for now; monitor   5. Pure hypercholesterolemia  LIPID PROFILE    Stable. Continue current medications; sent for labs and monitor for  results   6. Essential hypertension  COMP METABOLIC PANEL    MICROALBUMIN CREAT RATIO URINE    Stable. Continue current medications; sent for labs and monitor for results   7. Low serum vitamin D  VITAMIN D,25 HYDROXY    Uncontrolled; recommend over-the-counter supplementation. Monitor       Followup: Return in about 6 months (around 5/14/2018) for medication review, Short.

## 2017-11-15 ENCOUNTER — HOSPITAL ENCOUNTER (OUTPATIENT)
Facility: MEDICAL CENTER | Age: 82
End: 2017-11-15
Attending: FAMILY MEDICINE
Payer: COMMERCIAL

## 2017-11-15 DIAGNOSIS — N30.00 ACUTE CYSTITIS WITHOUT HEMATURIA: ICD-10-CM

## 2017-11-15 PROCEDURE — 87186 SC STD MICRODIL/AGAR DIL: CPT

## 2017-11-15 PROCEDURE — 87086 URINE CULTURE/COLONY COUNT: CPT

## 2017-11-15 PROCEDURE — 87077 CULTURE AEROBIC IDENTIFY: CPT

## 2017-11-17 LAB
BACTERIA UR CULT: ABNORMAL
SIGNIFICANT IND 70042: ABNORMAL
SITE SITE: ABNORMAL
SOURCE SOURCE: ABNORMAL

## 2018-04-10 ENCOUNTER — HOSPITAL ENCOUNTER (OUTPATIENT)
Dept: LAB | Facility: MEDICAL CENTER | Age: 83
End: 2018-04-10
Attending: OBSTETRICS & GYNECOLOGY
Payer: COMMERCIAL

## 2018-04-10 PROCEDURE — 88175 CYTOPATH C/V AUTO FLUID REDO: CPT

## 2018-04-13 LAB — CYTOLOGY REG CYTOL: NORMAL

## 2018-05-08 ENCOUNTER — HOSPITAL ENCOUNTER (OUTPATIENT)
Dept: LAB | Facility: MEDICAL CENTER | Age: 83
End: 2018-05-08
Attending: FAMILY MEDICINE
Payer: COMMERCIAL

## 2018-05-08 DIAGNOSIS — I10 ESSENTIAL HYPERTENSION: ICD-10-CM

## 2018-05-08 DIAGNOSIS — R79.89 LOW SERUM VITAMIN D: ICD-10-CM

## 2018-05-08 DIAGNOSIS — E78.00 PURE HYPERCHOLESTEROLEMIA: ICD-10-CM

## 2018-05-08 LAB
25(OH)D3 SERPL-MCNC: 17 NG/ML (ref 30–100)
CREAT UR-MCNC: 188.3 MG/DL
MICROALBUMIN UR-MCNC: 2.1 MG/DL
MICROALBUMIN/CREAT UR: 11 MG/G (ref 0–30)

## 2018-05-08 PROCEDURE — 82306 VITAMIN D 25 HYDROXY: CPT | Mod: GA

## 2018-05-08 PROCEDURE — 82570 ASSAY OF URINE CREATININE: CPT

## 2018-05-08 PROCEDURE — 80061 LIPID PANEL: CPT

## 2018-05-08 PROCEDURE — 80053 COMPREHEN METABOLIC PANEL: CPT

## 2018-05-08 PROCEDURE — 82043 UR ALBUMIN QUANTITATIVE: CPT

## 2018-05-08 PROCEDURE — 36415 COLL VENOUS BLD VENIPUNCTURE: CPT

## 2018-05-09 LAB
ALBUMIN SERPL BCP-MCNC: 4 G/DL (ref 3.2–4.9)
ALBUMIN/GLOB SERPL: 1.4 G/DL
ALP SERPL-CCNC: 45 U/L (ref 30–99)
ALT SERPL-CCNC: 14 U/L (ref 2–50)
ANION GAP SERPL CALC-SCNC: 11 MMOL/L (ref 0–11.9)
AST SERPL-CCNC: 18 U/L (ref 12–45)
BILIRUB SERPL-MCNC: 0.6 MG/DL (ref 0.1–1.5)
BUN SERPL-MCNC: 38 MG/DL (ref 8–22)
CALCIUM SERPL-MCNC: 11 MG/DL (ref 8.5–10.5)
CHLORIDE SERPL-SCNC: 103 MMOL/L (ref 96–112)
CHOLEST SERPL-MCNC: 139 MG/DL (ref 100–199)
CO2 SERPL-SCNC: 23 MMOL/L (ref 20–33)
CREAT SERPL-MCNC: 2 MG/DL (ref 0.5–1.4)
GLOBULIN SER CALC-MCNC: 2.8 G/DL (ref 1.9–3.5)
GLUCOSE SERPL-MCNC: 85 MG/DL (ref 65–99)
HDLC SERPL-MCNC: 49 MG/DL
LDLC SERPL CALC-MCNC: 62 MG/DL
POTASSIUM SERPL-SCNC: 4.2 MMOL/L (ref 3.6–5.5)
PROT SERPL-MCNC: 6.8 G/DL (ref 6–8.2)
SODIUM SERPL-SCNC: 137 MMOL/L (ref 135–145)
TRIGL SERPL-MCNC: 142 MG/DL (ref 0–149)

## 2018-05-15 ENCOUNTER — OFFICE VISIT (OUTPATIENT)
Dept: MEDICAL GROUP | Facility: PHYSICIAN GROUP | Age: 83
End: 2018-05-15
Payer: COMMERCIAL

## 2018-05-15 VITALS
WEIGHT: 135 LBS | TEMPERATURE: 97.6 F | BODY MASS INDEX: 26.5 KG/M2 | HEIGHT: 60 IN | HEART RATE: 71 BPM | OXYGEN SATURATION: 96 % | SYSTOLIC BLOOD PRESSURE: 112 MMHG | DIASTOLIC BLOOD PRESSURE: 62 MMHG

## 2018-05-15 DIAGNOSIS — N18.4 CKD (CHRONIC KIDNEY DISEASE) STAGE 4, GFR 15-29 ML/MIN (HCC): ICD-10-CM

## 2018-05-15 DIAGNOSIS — R79.89 LOW SERUM VITAMIN D: ICD-10-CM

## 2018-05-15 DIAGNOSIS — E78.00 PURE HYPERCHOLESTEROLEMIA: ICD-10-CM

## 2018-05-15 DIAGNOSIS — I10 ESSENTIAL HYPERTENSION: ICD-10-CM

## 2018-05-15 PROCEDURE — 99214 OFFICE O/P EST MOD 30 MIN: CPT | Performed by: FAMILY MEDICINE

## 2018-05-15 NOTE — ASSESSMENT & PLAN NOTE
Ongoing issue; patient reports compliance with amlodipine 5 mg and losartan 50 mg daily; currently denies any headache, dizziness, chest pain; current blood pressure is within recommended range

## 2018-05-15 NOTE — ASSESSMENT & PLAN NOTE
Ongoing issue; patient reports compliance with Lipitor 20 mg daily; currently denies any muscle cramps or weakness. Recently has been reviewed in detail with the patient today which shows a normal lipid panel.

## 2018-05-15 NOTE — ASSESSMENT & PLAN NOTE
Ongoing issue; recent labs and intervene detail with the patient today which shows a suboptimal vitamin D level at 17. Of note she has also had a significant decrease in her kidney function and these 2 things may actually be related today

## 2018-05-15 NOTE — PROGRESS NOTES
Subjective:   Rosa M Reeves is a 88 y.o. female here today for decrease in kidney function; elevated TC, low vit d, HTN    Essential hypertension  Ongoing issue; patient reports compliance with amlodipine 5 mg and losartan 50 mg daily; currently denies any headache, dizziness, chest pain; current blood pressure is within recommended range    Pure hypercholesterolemia  Ongoing issue; patient reports compliance with Lipitor 20 mg daily; currently denies any muscle cramps or weakness. Recently has been reviewed in detail with the patient today which shows a normal lipid panel.    Low serum vitamin D  Ongoing issue; recent labs and intervene detail with the patient today which shows a suboptimal vitamin D level at 17. Of note she has also had a significant decrease in her kidney function and these 2 things may actually be related today    CKD (chronic kidney disease) stage 4, GFR 15-29 ml/min ([facility])  New problem. Recently has been reviewed in detail with the patient today which shows that her kidney function has decreased. Her creatinine has increased to 2.0 her GFR is decreased to 24. Patient seen nephrology in the past; she will need reevaluation to determine if any new issues have arisen or if this is simply age-related.         Current medicines (including changes today)  Current Outpatient Prescriptions   Medication Sig Dispense Refill   • amlodipine (NORVASC) 5 MG Tab Take 1 Tab by mouth every day. 90 Tab 3   • atorvastatin (LIPITOR) 20 MG Tab Take 1 Tab by mouth every day. 90 Tab 3   • famotidine (PEPCID) 40 MG Tab Take 1 Tab by mouth every day. 90 Tab 3   • losartan (COZAAR) 50 MG Tab Take 1 Tab by mouth every day. 90 Tab 3   • aspirin EC (ECOTRIN) 81 MG Tablet Delayed Response Take 1 Tab by mouth every day. 30 Tab 0   • albuterol (VENTOLIN OR PROVENTIL) 108 (90 BASE) MCG/ACT Aero Soln inhalation aerosol Inhale 2 Puffs by mouth every 6 hours as needed for Shortness of Breath. 8.5 g 0     No current  facility-administered medications for this visit.      She  has a past medical history of GERD (gastroesophageal reflux disease) and Hypertension.    ROS   No chest pain, no shortness of breath, no abdominal pain       Objective:     Blood pressure 112/62, pulse 71, temperature 36.4 °C (97.6 °F), height 1.524 m (5'), weight 61.2 kg (135 lb), SpO2 96 %. Body mass index is 26.37 kg/m².   Physical Exam:  Alert, oriented in no acute distress.  Eye contact is good, speech goal directed, affect calm  HEENT: conjunctiva non-injected, sclera non-icteric.  Pinna normal. Oral mucous membranes pink and moist with no lesions.  Neck No adenopathy or masses in the neck or supraclavicular regions.  Lungs: clear to auscultation bilaterally with good excursion.  CV: regular rate and rhythm. Mild systolic ejection murmur noted  Abdomen: soft, nontender, No CVAT  Ext: no edema, color normal, vascularity normal, temperature normal        Assessment and Plan:   The following treatment plan was discussed     1. CKD (chronic kidney disease) stage 4, GFR 15-29 ml/min ([facility])  REFERRAL TO NEPHROLOGY    Uncontrolled; referral back to nephrology for reevaluation; monitor   2. Essential hypertension      Stable. Continue current medications; monitor   3. Pure hypercholesterolemia      Stable. Continue current medications; monitor   4. Low serum vitamin D      Uncontrolled; await evaluation by nephrology; monitor       Followup: Return in about 6 months (around 11/15/2018) for medication review, Short.

## 2018-05-15 NOTE — ASSESSMENT & PLAN NOTE
New problem. Recently has been reviewed in detail with the patient today which shows that her kidney function has decreased. Her creatinine has increased to 2.0 her GFR is decreased to 24. Patient seen nephrology in the past; she will need reevaluation to determine if any new issues have arisen or if this is simply age-related.

## 2018-05-24 ENCOUNTER — HOSPITAL ENCOUNTER (OUTPATIENT)
Dept: LAB | Facility: MEDICAL CENTER | Age: 83
End: 2018-05-24
Attending: INTERNAL MEDICINE
Payer: COMMERCIAL

## 2018-05-24 LAB
ALBUMIN SERPL BCP-MCNC: 4.6 G/DL (ref 3.2–4.9)
APPEARANCE UR: ABNORMAL
BACTERIA #/AREA URNS HPF: ABNORMAL /HPF
BILIRUB UR QL STRIP.AUTO: NEGATIVE
BUN SERPL-MCNC: 45 MG/DL (ref 8–22)
C3 SERPL-MCNC: 132 MG/DL (ref 87–200)
C4 SERPL-MCNC: 32 MG/DL (ref 19–52)
CALCIUM SERPL-MCNC: 11.7 MG/DL (ref 8.5–10.5)
CHLORIDE SERPL-SCNC: 99 MMOL/L (ref 96–112)
CO2 SERPL-SCNC: 29 MMOL/L (ref 20–33)
COLOR UR: YELLOW
CREAT SERPL-MCNC: 2.59 MG/DL (ref 0.5–1.4)
CREAT UR-MCNC: 95.8 MG/DL
EPI CELLS #/AREA URNS HPF: ABNORMAL /HPF
GLUCOSE SERPL-MCNC: 90 MG/DL (ref 65–99)
GLUCOSE UR STRIP.AUTO-MCNC: NEGATIVE MG/DL
HYALINE CASTS #/AREA URNS LPF: ABNORMAL /LPF
KETONES UR STRIP.AUTO-MCNC: ABNORMAL MG/DL
LEUKOCYTE ESTERASE UR QL STRIP.AUTO: ABNORMAL
MICRO URNS: ABNORMAL
NITRITE UR QL STRIP.AUTO: NEGATIVE
PH UR STRIP.AUTO: 7 [PH]
PHOSPHATE SERPL-MCNC: 3.7 MG/DL (ref 2.5–4.5)
POTASSIUM SERPL-SCNC: 4.9 MMOL/L (ref 3.6–5.5)
PROT UR QL STRIP: NEGATIVE MG/DL
PROT UR-MCNC: 19.1 MG/DL (ref 0–15)
PROT/CREAT UR: 199 MG/G (ref 10–107)
PTH-INTACT SERPL-MCNC: 99.6 PG/ML (ref 14–72)
RBC # URNS HPF: ABNORMAL /HPF
RBC UR QL AUTO: NEGATIVE
SODIUM SERPL-SCNC: 136 MMOL/L (ref 135–145)
SP GR UR STRIP.AUTO: 1.01
UROBILINOGEN UR STRIP.AUTO-MCNC: 0.2 MG/DL
WBC #/AREA URNS HPF: ABNORMAL /HPF

## 2018-05-24 PROCEDURE — 83883 ASSAY NEPHELOMETRY NOT SPEC: CPT | Mod: 91

## 2018-05-24 PROCEDURE — 84156 ASSAY OF PROTEIN URINE: CPT

## 2018-05-24 PROCEDURE — 36415 COLL VENOUS BLD VENIPUNCTURE: CPT

## 2018-05-24 PROCEDURE — 81001 URINALYSIS AUTO W/SCOPE: CPT

## 2018-05-24 PROCEDURE — 83970 ASSAY OF PARATHORMONE: CPT

## 2018-05-24 PROCEDURE — 82784 ASSAY IGA/IGD/IGG/IGM EACH: CPT

## 2018-05-24 PROCEDURE — 84160 ASSAY OF PROTEIN ANY SOURCE: CPT

## 2018-05-24 PROCEDURE — 86334 IMMUNOFIX E-PHORESIS SERUM: CPT

## 2018-05-24 PROCEDURE — 82164 ANGIOTENSIN I ENZYME TEST: CPT

## 2018-05-24 PROCEDURE — 84165 PROTEIN E-PHORESIS SERUM: CPT

## 2018-05-24 PROCEDURE — 86160 COMPLEMENT ANTIGEN: CPT | Mod: 91

## 2018-05-24 PROCEDURE — 80069 RENAL FUNCTION PANEL: CPT

## 2018-05-24 PROCEDURE — 82570 ASSAY OF URINE CREATININE: CPT

## 2018-05-26 LAB — ACE SERPL-CCNC: 34 U/L (ref 9–67)

## 2018-05-27 LAB
ALBUMIN SERPL-MCNC: 4.67 G/DL (ref 3.75–5.01)
ALPHA1 GLOB SERPL ELPH-MCNC: 0.35 G/DL (ref 0.19–0.46)
ALPHA2 GLOB SERPL ELPH-MCNC: 0.93 G/DL (ref 0.48–1.05)
B-GLOBULIN SERPL ELPH-MCNC: 0.76 G/DL (ref 0.48–1.1)
GAMMA GLOB SERPL ELPH-MCNC: 0.8 G/DL (ref 0.62–1.51)
IGA SERPL-MCNC: 96 MG/DL (ref 68–408)
IGG SERPL-MCNC: 874 MG/DL (ref 768–1632)
IGM SERPL-MCNC: 53 MG/DL (ref 35–263)
INTERPRETATION SERPL IFE-IMP: NORMAL
INTERPRETATION SERPL IFE-IMP: NORMAL
KAPPA LC FREE SER-MCNC: 4.32 MG/DL (ref 0.33–1.94)
KAPPA LC FREE/LAMBDA FREE SER NEPH: 2.05 {RATIO} (ref 0.26–1.65)
LAMBDA LC FREE SERPL-MCNC: 2.11 MG/DL (ref 0.57–2.63)
PATHOLOGY STUDY: NORMAL
PROT SERPL-MCNC: 7.5 G/DL (ref 6–8.3)

## 2018-06-07 ENCOUNTER — HOSPITAL ENCOUNTER (OUTPATIENT)
Dept: RADIOLOGY | Facility: MEDICAL CENTER | Age: 83
End: 2018-06-07
Attending: INTERNAL MEDICINE
Payer: COMMERCIAL

## 2018-06-07 DIAGNOSIS — I12.9 RENAL HYPERTENSION: ICD-10-CM

## 2018-06-07 DIAGNOSIS — N17.9 ACUTE RENAL FAILURE, UNSPECIFIED ACUTE RENAL FAILURE TYPE (HCC): ICD-10-CM

## 2018-06-07 DIAGNOSIS — E83.52 HYPERCALCEMIA: ICD-10-CM

## 2018-06-07 DIAGNOSIS — N18.4 CHRONIC KIDNEY DISEASE, STAGE IV (SEVERE) (HCC): ICD-10-CM

## 2018-06-07 PROCEDURE — A9500 TC99M SESTAMIBI: HCPCS

## 2018-06-12 ENCOUNTER — HOSPITAL ENCOUNTER (OUTPATIENT)
Dept: RADIOLOGY | Facility: MEDICAL CENTER | Age: 83
End: 2018-06-12
Attending: INTERNAL MEDICINE
Payer: COMMERCIAL

## 2018-06-12 DIAGNOSIS — N18.4 CHRONIC KIDNEY DISEASE, STAGE IV (SEVERE) (HCC): ICD-10-CM

## 2018-06-12 DIAGNOSIS — E83.52 HYPERCALCEMIA: ICD-10-CM

## 2018-06-12 DIAGNOSIS — I12.9 RENAL HYPERTENSION: ICD-10-CM

## 2018-06-12 PROCEDURE — 76775 US EXAM ABDO BACK WALL LIM: CPT

## 2018-06-13 ENCOUNTER — HOSPITAL ENCOUNTER (OUTPATIENT)
Dept: LAB | Facility: MEDICAL CENTER | Age: 83
End: 2018-06-13
Attending: INTERNAL MEDICINE
Payer: COMMERCIAL

## 2018-06-13 LAB
ALBUMIN SERPL BCP-MCNC: 4.3 G/DL (ref 3.2–4.9)
BUN SERPL-MCNC: 24 MG/DL (ref 8–22)
CALCIUM SERPL-MCNC: 7.4 MG/DL (ref 8.5–10.5)
CHLORIDE SERPL-SCNC: 107 MMOL/L (ref 96–112)
CO2 SERPL-SCNC: 25 MMOL/L (ref 20–33)
CREAT SERPL-MCNC: 1.59 MG/DL (ref 0.5–1.4)
GLUCOSE SERPL-MCNC: 99 MG/DL (ref 65–99)
PHOSPHATE SERPL-MCNC: 4 MG/DL (ref 2.5–4.5)
POTASSIUM SERPL-SCNC: 4.7 MMOL/L (ref 3.6–5.5)
PTH-INTACT SERPL-MCNC: 161.3 PG/ML (ref 14–72)
SODIUM SERPL-SCNC: 144 MMOL/L (ref 135–145)

## 2018-06-13 PROCEDURE — 83970 ASSAY OF PARATHORMONE: CPT

## 2018-06-13 PROCEDURE — 36415 COLL VENOUS BLD VENIPUNCTURE: CPT

## 2018-06-13 PROCEDURE — 80069 RENAL FUNCTION PANEL: CPT

## 2018-06-26 NOTE — TELEPHONE ENCOUNTER
After hours phone call 2/18/2017 at 1750 RE: Rosa M Reeves [phone] (home)  From the LAB for critical results of Calcium of 13.4. This was normal < 1 year ago. Corrected calcium is 13.2.   PCP Faith Burr D.O.  Patient reports: She is taking exogenous calcium. Her renal function is poor. She denies any muscle contractions, bone pain, mood changes, abdominal pain.   Plan: Stop calcium for now given her renal function. Recheck calcium and PTH. Follow up with Dr. Burr sooner that 3/20 if able  Patient instructed to call or go to ER if symptoms worsen or they have any new concerns.  Faith Alejandre M.D.          
Please call pt today - she will need to repeat labs one week prior to next visit on 3/20 - labs are ordered.    Faith Burr D.O.    
UCG/CBC

## 2018-08-04 ENCOUNTER — HOSPITAL ENCOUNTER (OUTPATIENT)
Dept: LAB | Facility: MEDICAL CENTER | Age: 83
End: 2018-08-04
Attending: INTERNAL MEDICINE
Payer: COMMERCIAL

## 2018-08-04 LAB
ALBUMIN SERPL BCP-MCNC: 4.4 G/DL (ref 3.2–4.9)
ALBUMIN/GLOB SERPL: 1.9 G/DL
ALP SERPL-CCNC: 66 U/L (ref 30–99)
ALT SERPL-CCNC: 12 U/L (ref 2–50)
ANION GAP SERPL CALC-SCNC: 9 MMOL/L (ref 0–11.9)
AST SERPL-CCNC: 15 U/L (ref 12–45)
BILIRUB SERPL-MCNC: 0.5 MG/DL (ref 0.1–1.5)
BUN SERPL-MCNC: 44 MG/DL (ref 8–22)
CALCIUM SERPL-MCNC: 8 MG/DL (ref 8.5–10.5)
CHLORIDE SERPL-SCNC: 106 MMOL/L (ref 96–112)
CO2 SERPL-SCNC: 25 MMOL/L (ref 20–33)
CREAT SERPL-MCNC: 2.03 MG/DL (ref 0.5–1.4)
GLOBULIN SER CALC-MCNC: 2.3 G/DL (ref 1.9–3.5)
GLUCOSE SERPL-MCNC: 103 MG/DL (ref 65–99)
POTASSIUM SERPL-SCNC: 4.4 MMOL/L (ref 3.6–5.5)
PROT SERPL-MCNC: 6.7 G/DL (ref 6–8.2)
PTH-INTACT SERPL-MCNC: 97.2 PG/ML (ref 14–72)
SODIUM SERPL-SCNC: 140 MMOL/L (ref 135–145)

## 2018-08-04 PROCEDURE — 80053 COMPREHEN METABOLIC PANEL: CPT

## 2018-08-04 PROCEDURE — 83970 ASSAY OF PARATHORMONE: CPT

## 2018-08-04 PROCEDURE — 36415 COLL VENOUS BLD VENIPUNCTURE: CPT

## 2018-10-06 ENCOUNTER — HOSPITAL ENCOUNTER (OUTPATIENT)
Dept: LAB | Facility: MEDICAL CENTER | Age: 83
End: 2018-10-06
Attending: NURSE PRACTITIONER
Payer: COMMERCIAL

## 2018-10-06 LAB
25(OH)D3 SERPL-MCNC: 23 NG/ML (ref 30–100)
APPEARANCE UR: ABNORMAL
BACTERIA #/AREA URNS HPF: ABNORMAL /HPF
BASOPHILS # BLD AUTO: 0.3 % (ref 0–1.8)
BASOPHILS # BLD: 0.01 K/UL (ref 0–0.12)
BILIRUB UR QL STRIP.AUTO: NEGATIVE
COLOR UR: YELLOW
EOSINOPHIL # BLD AUTO: 0.01 K/UL (ref 0–0.51)
EOSINOPHIL NFR BLD: 0.3 % (ref 0–6.9)
EPI CELLS #/AREA URNS HPF: ABNORMAL /HPF
ERYTHROCYTE [DISTWIDTH] IN BLOOD BY AUTOMATED COUNT: 49.9 FL (ref 35.9–50)
GLUCOSE UR STRIP.AUTO-MCNC: NEGATIVE MG/DL
HCT VFR BLD AUTO: 37.2 % (ref 37–47)
HGB BLD-MCNC: 11.6 G/DL (ref 12–16)
HYALINE CASTS #/AREA URNS LPF: ABNORMAL /LPF
IMM GRANULOCYTES # BLD AUTO: 0.02 K/UL (ref 0–0.11)
IMM GRANULOCYTES NFR BLD AUTO: 0.6 % (ref 0–0.9)
KETONES UR STRIP.AUTO-MCNC: NEGATIVE MG/DL
LEUKOCYTE ESTERASE UR QL STRIP.AUTO: ABNORMAL
LYMPHOCYTES # BLD AUTO: 1.04 K/UL (ref 1–4.8)
LYMPHOCYTES NFR BLD: 33.5 % (ref 22–41)
MCH RBC QN AUTO: 30.2 PG (ref 27–33)
MCHC RBC AUTO-ENTMCNC: 31.2 G/DL (ref 33.6–35)
MCV RBC AUTO: 96.9 FL (ref 81.4–97.8)
MICRO URNS: ABNORMAL
MONOCYTES # BLD AUTO: 0.43 K/UL (ref 0–0.85)
MONOCYTES NFR BLD AUTO: 13.9 % (ref 0–13.4)
NEUTROPHILS # BLD AUTO: 1.59 K/UL (ref 2–7.15)
NEUTROPHILS NFR BLD: 51.4 % (ref 44–72)
NITRITE UR QL STRIP.AUTO: NEGATIVE
NRBC # BLD AUTO: 0 K/UL
NRBC BLD-RTO: 0 /100 WBC
PH UR STRIP.AUTO: 6.5 [PH]
PLATELET # BLD AUTO: 168 K/UL (ref 164–446)
PMV BLD AUTO: 11.6 FL (ref 9–12.9)
PROT UR QL STRIP: NEGATIVE MG/DL
RBC # BLD AUTO: 3.84 M/UL (ref 4.2–5.4)
RBC # URNS HPF: ABNORMAL /HPF
RBC UR QL AUTO: NEGATIVE
SP GR UR STRIP.AUTO: 1.02
UROBILINOGEN UR STRIP.AUTO-MCNC: 0.2 MG/DL
WBC # BLD AUTO: 3.1 K/UL (ref 4.8–10.8)
WBC #/AREA URNS HPF: ABNORMAL /HPF

## 2018-10-06 PROCEDURE — 85025 COMPLETE CBC W/AUTO DIFF WBC: CPT

## 2018-10-06 PROCEDURE — 36415 COLL VENOUS BLD VENIPUNCTURE: CPT

## 2018-10-06 PROCEDURE — 83970 ASSAY OF PARATHORMONE: CPT

## 2018-10-06 PROCEDURE — 82306 VITAMIN D 25 HYDROXY: CPT

## 2018-10-06 PROCEDURE — 87086 URINE CULTURE/COLONY COUNT: CPT

## 2018-10-06 PROCEDURE — 81001 URINALYSIS AUTO W/SCOPE: CPT

## 2018-10-08 LAB
BACTERIA UR CULT: NORMAL
CALCIUM SERPL-MCNC: 8.5 MG/DL (ref 8.5–10.5)
PTH-INTACT SERPL-MCNC: 117.5 PG/ML (ref 14–72)
SIGNIFICANT IND 70042: NORMAL
SITE SITE: NORMAL
SOURCE SOURCE: NORMAL

## 2018-10-16 ENCOUNTER — OFFICE VISIT (OUTPATIENT)
Dept: MEDICAL GROUP | Facility: PHYSICIAN GROUP | Age: 83
End: 2018-10-16
Payer: COMMERCIAL

## 2018-10-16 VITALS
BODY MASS INDEX: 26.11 KG/M2 | SYSTOLIC BLOOD PRESSURE: 128 MMHG | HEART RATE: 68 BPM | OXYGEN SATURATION: 96 % | TEMPERATURE: 98.2 F | DIASTOLIC BLOOD PRESSURE: 68 MMHG | HEIGHT: 60 IN | WEIGHT: 133 LBS

## 2018-10-16 DIAGNOSIS — E83.52 HYPERCALCEMIA: ICD-10-CM

## 2018-10-16 DIAGNOSIS — N18.4 CKD (CHRONIC KIDNEY DISEASE) STAGE 4, GFR 15-29 ML/MIN (HCC): ICD-10-CM

## 2018-10-16 DIAGNOSIS — I10 ESSENTIAL HYPERTENSION: ICD-10-CM

## 2018-10-16 DIAGNOSIS — E78.00 PURE HYPERCHOLESTEROLEMIA: ICD-10-CM

## 2018-10-16 PROCEDURE — 99214 OFFICE O/P EST MOD 30 MIN: CPT | Performed by: FAMILY MEDICINE

## 2018-10-16 RX ORDER — ATORVASTATIN CALCIUM 20 MG/1
20 TABLET, FILM COATED ORAL DAILY
Qty: 90 TAB | Refills: 3 | Status: SHIPPED | OUTPATIENT
Start: 2018-10-16 | End: 2019-02-22 | Stop reason: SDUPTHER

## 2018-10-16 RX ORDER — AMLODIPINE BESYLATE 5 MG/1
5 TABLET ORAL DAILY
Qty: 90 TAB | Refills: 3 | Status: SHIPPED | OUTPATIENT
Start: 2018-10-16 | End: 2019-02-22 | Stop reason: SDUPTHER

## 2018-10-16 RX ORDER — LOSARTAN POTASSIUM 50 MG/1
50 TABLET ORAL DAILY
Qty: 90 TAB | Refills: 3 | Status: SHIPPED | OUTPATIENT
Start: 2018-10-16 | End: 2019-02-22 | Stop reason: SDUPTHER

## 2018-10-16 RX ORDER — CINACALCET 30 MG/1
30 TABLET, FILM COATED ORAL DAILY
COMMUNITY
End: 2019-02-22

## 2018-10-16 RX ORDER — FAMOTIDINE 40 MG/1
40 TABLET, FILM COATED ORAL DAILY
Qty: 90 TAB | Refills: 3 | Status: SHIPPED | OUTPATIENT
Start: 2018-10-16 | End: 2019-02-22 | Stop reason: SDUPTHER

## 2018-10-16 ASSESSMENT — PATIENT HEALTH QUESTIONNAIRE - PHQ9: CLINICAL INTERPRETATION OF PHQ2 SCORE: 0

## 2018-10-16 NOTE — ASSESSMENT & PLAN NOTE
Ongoing issue.  Most recent lab shows that her GFR is at 23.  Review of chart shows that she currently is not on any significant nephrotoxic medications.  She does follow up with nephrology now.

## 2018-10-16 NOTE — PROGRESS NOTES
Subjective:   Delphine Reeves is a 88 y.o. female here today for hypertension, hypercholesterolemia, hypercalcemia, chronic kidney disease    Essential hypertension  Ongoing issues; patient compliant with losartan 50 mg and amlodipine 5 mg daily.  Denies any headache, dizziness, chest pain; current blood pressure and heart rate both in a recommended range.    Pure hypercholesterolemia  Ongoing issues; patient compliant with Lipitor 20 mg daily; currently denies any muscle cramps or weakness.    Hypercalcemia  Ongoing issue.  Patient has been referred to a specialist for evaluation.  At this point there is no specific diagnosis except for possible hyperparathyroidism.  Patient currently is on Sensipar; recent labs have been reviewed in detail with the patient today which shows her most recent calcium level is normal at 8.5.    CKD (chronic kidney disease) stage 4, GFR 15-29 ml/min ([facility])  Ongoing issue.  Most recent lab shows that her GFR is at 23.  Review of chart shows that she currently is not on any significant nephrotoxic medications.  She does follow up with nephrology now.         Current medicines (including changes today)  Current Outpatient Prescriptions   Medication Sig Dispense Refill   • cinacalcet (SENSIPAR) 30 MG Tab Take 30 mg by mouth every day.     • losartan (COZAAR) 50 MG Tab Take 1 Tab by mouth every day. 90 Tab 3   • amLODIPine (NORVASC) 5 MG Tab Take 1 Tab by mouth every day. 90 Tab 3   • atorvastatin (LIPITOR) 20 MG Tab Take 1 Tab by mouth every day. 90 Tab 3   • famotidine (PEPCID) 40 MG Tab Take 1 Tab by mouth every day. 90 Tab 3   • aspirin EC (ECOTRIN) 81 MG Tablet Delayed Response Take 1 Tab by mouth every day. 30 Tab 0   • albuterol (VENTOLIN OR PROVENTIL) 108 (90 BASE) MCG/ACT Aero Soln inhalation aerosol Inhale 2 Puffs by mouth every 6 hours as needed for Shortness of Breath. 8.5 g 0     No current facility-administered medications for this visit.      She  has a past medical history of  GERD (gastroesophageal reflux disease) and Hypertension.    ROS   No chest pain, no shortness of breath, no abdominal pain       Objective:     Blood pressure 128/68, pulse 68, temperature 36.8 °C (98.2 °F), temperature source Temporal, height 1.524 m (5'), weight 60.3 kg (133 lb), SpO2 96 %. Body mass index is 25.97 kg/m².   Physical Exam:  Alert, oriented in no acute distress.  Eye contact is good, speech goal directed, affect calm  HEENT: conjunctiva non-injected, sclera non-icteric.  Pinna normal. Oral mucous membranes pink and moist with no lesions.  Neck No adenopathy or masses in the neck or supraclavicular regions.  Lungs: clear to auscultation bilaterally with good excursion.  CV: regular rate and rhythm. Mild systolic ejection murmur  Abdomen: soft, nontender, No CVAT  Ext: no edema, color normal, vascularity normal, temperature normal        Assessment and Plan:   The following treatment plan was discussed   1. Hypercalcemia      Improved; continue medication; continue follow with nephrology as directed   2. CKD (chronic kidney disease) stage 4, GFR 15-29 ml/min ([facility])      Stable.  Continue follow with nephrology as directed; monitor   3. Essential hypertension      Stable.  Continue current medications; refills provided; monitor   4. Pure hypercholesterolemia      Stable.  Continue current medication; refill provided; monitor       Followup: Return in about 4 months (around 2/16/2019), or if symptoms worsen or fail to improve, for labs, Short.

## 2018-10-16 NOTE — ASSESSMENT & PLAN NOTE
Ongoing issue.  Patient has been referred to a specialist for evaluation.  At this point there is no specific diagnosis except for possible hyperparathyroidism.  Patient currently is on Sensipar; recent labs have been reviewed in detail with the patient today which shows her most recent calcium level is normal at 8.5.

## 2018-10-16 NOTE — ASSESSMENT & PLAN NOTE
Ongoing issues; patient compliant with Lipitor 20 mg daily; currently denies any muscle cramps or weakness.

## 2018-10-16 NOTE — ASSESSMENT & PLAN NOTE
Ongoing issues; patient compliant with losartan 50 mg and amlodipine 5 mg daily.  Denies any headache, dizziness, chest pain; current blood pressure and heart rate both in a recommended range.

## 2018-12-26 ENCOUNTER — HOSPITAL ENCOUNTER (OUTPATIENT)
Dept: LAB | Facility: MEDICAL CENTER | Age: 83
End: 2018-12-26
Attending: NURSE PRACTITIONER
Payer: COMMERCIAL

## 2018-12-26 LAB
25(OH)D3 SERPL-MCNC: 14 NG/ML (ref 30–100)
ALBUMIN SERPL BCP-MCNC: 4.3 G/DL (ref 3.2–4.9)
ALBUMIN/GLOB SERPL: 1.7 G/DL
ALP SERPL-CCNC: 86 U/L (ref 30–99)
ALT SERPL-CCNC: 13 U/L (ref 2–50)
ANION GAP SERPL CALC-SCNC: 9 MMOL/L (ref 0–11.9)
APPEARANCE UR: ABNORMAL
AST SERPL-CCNC: 16 U/L (ref 12–45)
BACTERIA #/AREA URNS HPF: ABNORMAL /HPF
BASOPHILS # BLD AUTO: 0.3 % (ref 0–1.8)
BASOPHILS # BLD: 0.01 K/UL (ref 0–0.12)
BILIRUB SERPL-MCNC: 0.4 MG/DL (ref 0.1–1.5)
BILIRUB UR QL STRIP.AUTO: NEGATIVE
BUN SERPL-MCNC: 22 MG/DL (ref 8–22)
CALCIUM SERPL-MCNC: 8.3 MG/DL (ref 8.5–10.5)
CHLORIDE SERPL-SCNC: 110 MMOL/L (ref 96–112)
CO2 SERPL-SCNC: 26 MMOL/L (ref 20–33)
COLOR UR: YELLOW
CREAT SERPL-MCNC: 1.38 MG/DL (ref 0.5–1.4)
EOSINOPHIL # BLD AUTO: 0 K/UL (ref 0–0.51)
EOSINOPHIL NFR BLD: 0 % (ref 0–6.9)
EPI CELLS #/AREA URNS HPF: ABNORMAL /HPF
ERYTHROCYTE [DISTWIDTH] IN BLOOD BY AUTOMATED COUNT: 50.2 FL (ref 35.9–50)
GLOBULIN SER CALC-MCNC: 2.6 G/DL (ref 1.9–3.5)
GLUCOSE SERPL-MCNC: 95 MG/DL (ref 65–99)
GLUCOSE UR STRIP.AUTO-MCNC: NEGATIVE MG/DL
HCT VFR BLD AUTO: 36.1 % (ref 37–47)
HGB BLD-MCNC: 11 G/DL (ref 12–16)
HYALINE CASTS #/AREA URNS LPF: ABNORMAL /LPF
IMM GRANULOCYTES # BLD AUTO: 0.01 K/UL (ref 0–0.11)
IMM GRANULOCYTES NFR BLD AUTO: 0.3 % (ref 0–0.9)
KETONES UR STRIP.AUTO-MCNC: NEGATIVE MG/DL
LEUKOCYTE ESTERASE UR QL STRIP.AUTO: ABNORMAL
LYMPHOCYTES # BLD AUTO: 0.99 K/UL (ref 1–4.8)
LYMPHOCYTES NFR BLD: 32.8 % (ref 22–41)
MCH RBC QN AUTO: 29.7 PG (ref 27–33)
MCHC RBC AUTO-ENTMCNC: 30.5 G/DL (ref 33.6–35)
MCV RBC AUTO: 97.6 FL (ref 81.4–97.8)
MICRO URNS: ABNORMAL
MONOCYTES # BLD AUTO: 0.4 K/UL (ref 0–0.85)
MONOCYTES NFR BLD AUTO: 13.2 % (ref 0–13.4)
NEUTROPHILS # BLD AUTO: 1.61 K/UL (ref 2–7.15)
NEUTROPHILS NFR BLD: 53.4 % (ref 44–72)
NITRITE UR QL STRIP.AUTO: NEGATIVE
NRBC # BLD AUTO: 0 K/UL
NRBC BLD-RTO: 0 /100 WBC
PH UR STRIP.AUTO: 7 [PH]
PLATELET # BLD AUTO: 175 K/UL (ref 164–446)
PMV BLD AUTO: 12.1 FL (ref 9–12.9)
POTASSIUM SERPL-SCNC: 5.3 MMOL/L (ref 3.6–5.5)
PROT SERPL-MCNC: 6.9 G/DL (ref 6–8.2)
PROT UR QL STRIP: NEGATIVE MG/DL
PTH-INTACT SERPL-MCNC: 157.5 PG/ML (ref 14–72)
RBC # BLD AUTO: 3.7 M/UL (ref 4.2–5.4)
RBC # URNS HPF: ABNORMAL /HPF
RBC UR QL AUTO: NEGATIVE
SODIUM SERPL-SCNC: 145 MMOL/L (ref 135–145)
SP GR UR STRIP.AUTO: 1.02
UROBILINOGEN UR STRIP.AUTO-MCNC: 0.2 MG/DL
WBC # BLD AUTO: 3 K/UL (ref 4.8–10.8)
WBC #/AREA URNS HPF: ABNORMAL /HPF

## 2018-12-26 PROCEDURE — 83970 ASSAY OF PARATHORMONE: CPT

## 2018-12-26 PROCEDURE — 36415 COLL VENOUS BLD VENIPUNCTURE: CPT

## 2018-12-26 PROCEDURE — 80053 COMPREHEN METABOLIC PANEL: CPT

## 2018-12-26 PROCEDURE — 81001 URINALYSIS AUTO W/SCOPE: CPT

## 2018-12-26 PROCEDURE — 82306 VITAMIN D 25 HYDROXY: CPT

## 2018-12-26 PROCEDURE — 85025 COMPLETE CBC W/AUTO DIFF WBC: CPT

## 2019-02-22 ENCOUNTER — OFFICE VISIT (OUTPATIENT)
Dept: MEDICAL GROUP | Facility: PHYSICIAN GROUP | Age: 84
End: 2019-02-22
Payer: COMMERCIAL

## 2019-02-22 VITALS
SYSTOLIC BLOOD PRESSURE: 110 MMHG | OXYGEN SATURATION: 98 % | TEMPERATURE: 97.9 F | DIASTOLIC BLOOD PRESSURE: 68 MMHG | BODY MASS INDEX: 25.13 KG/M2 | RESPIRATION RATE: 12 BRPM | WEIGHT: 128 LBS | HEIGHT: 60 IN | HEART RATE: 70 BPM

## 2019-02-22 DIAGNOSIS — E21.3 HYPERPARATHYROIDISM (HCC): ICD-10-CM

## 2019-02-22 DIAGNOSIS — I10 ESSENTIAL HYPERTENSION: ICD-10-CM

## 2019-02-22 DIAGNOSIS — E78.00 PURE HYPERCHOLESTEROLEMIA: ICD-10-CM

## 2019-02-22 DIAGNOSIS — K21.9 GASTROESOPHAGEAL REFLUX DISEASE WITHOUT ESOPHAGITIS: ICD-10-CM

## 2019-02-22 PROBLEM — N17.9 AKI (ACUTE KIDNEY INJURY) (HCC): Status: RESOLVED | Noted: 2017-03-24 | Resolved: 2019-02-22

## 2019-02-22 PROBLEM — R30.0 DYSURIA: Status: RESOLVED | Noted: 2017-11-14 | Resolved: 2019-02-22

## 2019-02-22 PROBLEM — N18.4 CKD (CHRONIC KIDNEY DISEASE) STAGE 4, GFR 15-29 ML/MIN (HCC): Status: RESOLVED | Noted: 2018-05-15 | Resolved: 2019-02-22

## 2019-02-22 PROCEDURE — 99214 OFFICE O/P EST MOD 30 MIN: CPT | Performed by: FAMILY MEDICINE

## 2019-02-22 RX ORDER — LOSARTAN POTASSIUM 50 MG/1
50 TABLET ORAL DAILY
Qty: 90 TAB | Refills: 3 | Status: SHIPPED
Start: 2019-02-22 | End: 2020-01-07 | Stop reason: SDUPTHER

## 2019-02-22 RX ORDER — FAMOTIDINE 40 MG/1
40 TABLET, FILM COATED ORAL DAILY
Qty: 90 TAB | Refills: 3 | Status: SHIPPED
Start: 2019-02-22 | End: 2020-01-07 | Stop reason: SDUPTHER

## 2019-02-22 RX ORDER — ATORVASTATIN CALCIUM 20 MG/1
20 TABLET, FILM COATED ORAL DAILY
Qty: 90 TAB | Refills: 3 | Status: SHIPPED
Start: 2019-02-22 | End: 2020-01-07 | Stop reason: SDUPTHER

## 2019-02-22 RX ORDER — CINACALCET HYDROCHLORIDE 60 MG/1
1 TABLET, COATED ORAL
Refills: 7 | COMMUNITY
Start: 2018-12-13 | End: 2022-09-01

## 2019-02-22 RX ORDER — AMLODIPINE BESYLATE 5 MG/1
5 TABLET ORAL DAILY
Qty: 90 TAB | Refills: 3 | Status: SHIPPED
Start: 2019-02-22 | End: 2020-01-07 | Stop reason: SDUPTHER

## 2019-02-22 ASSESSMENT — PATIENT HEALTH QUESTIONNAIRE - PHQ9: CLINICAL INTERPRETATION OF PHQ2 SCORE: 0

## 2019-02-22 NOTE — LETTER
[facility]  Lorie Kumar M.D.  [address]  [place] [address]  Fax: [phone]   Authorization for Release/Disclosure of   Protected Health Information   Name: DELPHINE REEVES : 1930 SSN: [id number]   Address: [facility] [place] [address] Phone:    [phone] (home)    I authorize the entity listed below to release/disclose the PHI below to:   [facility]/Lorie Kumar M.D. and Lorie Kumar M.D.   Provider or Entity Name:  Francesco Hernandez [facility]   Address   City, [facility], [facility]   Phone:      Fax:     Reason for request: continuity of care   Information to be released:  Records from last year please    [  ] LAST COLONOSCOPY,  including any PATH REPORT and follow-up  [  ] LAST FIT/COLOGUARD RESULT [  ] LAST DEXA  [  ] LAST MAMMOGRAM  [  ] LAST PAP  [  ] LAST LABS [  ] RETINA EXAM REPORT  [  ] IMMUNIZATION RECORDS  [  ] Release all info      [  ] Check here and initial the line next to each item to release ALL health information INCLUDING  _____ Care and treatment for drug and / or alcohol abuse  _____ HIV testing, infection status, or AIDS  _____ Genetic Testing    DATES OF SERVICE OR TIME PERIOD TO BE DISCLOSED: _____________  I understand and acknowledge that:  * This Authorization may be revoked at any time by you in writing, except if your health information has already been used or disclosed.  * Your health information that will be used or disclosed as a result of you signing this authorization could be re-disclosed by the recipient. If this occurs, your re-disclosed health information may no longer be protected by State or Federal laws.  * You may refuse to sign this Authorization. Your refusal will not affect your ability to obtain treatment.  * This Authorization becomes effective upon signing and will  on (date) __________.      If no date is indicated, this Authorization will  one (1) year from the signature date.    Name: Delphine Reeves    Signature:   Date:     2/22/2019       PLEASE FAX REQUESTED RECORDS BACK TO: [phone]

## 2019-02-22 NOTE — PROGRESS NOTES
CC:  Diagnoses of Essential hypertension, Gastroesophageal reflux disease without esophagitis, Pure hypercholesterolemia, and Hyperparathyroidism ([facility]) were pertinent to this visit.    HISTORY OF THE PRESENT ILLNESS: Patient is a 88 y.o. female. This pleasant patient is here today to establish care.    Health Maintenance: She is due for her new shingles vaccine.      Essential hypertension  This is a chronic medical problem which is well controlled with Cozaar 50 mg and Norvasc 5 mg daily.    Her blood pressure today is 110/68.  She denies any headaches or chest pain.    Gastroesophageal reflux disease without esophagitis  This is a chronic medical problem for which she continues to take Pepcid 40 mg daily for the last 6 months.    She denies any heartburn, nausea vomiting or abdominal pain.      Pure hypercholesterolemia  This is a chronic medical problem for which she continues to take liptor 20 mg.  She denies any lower extremity.  Most recent fasting lipid profile from May 2018 was total cholesterol 139, triglycerides 142, HDL 49, and LDL 62.    Hyperparathyroidism (CMS-HCC)  Chronic medical condition for which she continues to follow-up with nephrology, Dr. Hernandez.  She most recently saw him in December and at that time Her labs showed serum calcium level of 8.3 GFR 36, vitamin D 14, and intact .5.  At that time her Sensipar was decreased to every other day.    Allergies: Patient has no known allergies.    Current Outpatient Prescriptions Ordered in [facility]   Medication Sig Dispense Refill   • Cholecalciferol (VITAMIN D-3) 5000 units Tab Take  by mouth.     • amLODIPine (NORVASC) 5 MG Tab Take 1 Tab by mouth every day. 90 Tab 3   • atorvastatin (LIPITOR) 20 MG Tab Take 1 Tab by mouth every day. 90 Tab 3   • famotidine (PEPCID) 40 MG Tab Take 1 Tab by mouth every day. 90 Tab 3   • losartan (COZAAR) 50 MG Tab Take 1 Tab by mouth every day. 90 Tab 3   • SENSIPAR 60 MG Tab Take 1 Tab by mouth every day.  7   •  aspirin EC (ECOTRIN) 81 MG Tablet Delayed Response Take 1 Tab by mouth every day. 30 Tab 0     No current Epic-ordered facility-administered medications on file.        Past Medical History:   Diagnosis Date   • GERD (gastroesophageal reflux disease)    • Hyperparathyroidism (HCC)    • Hypertension        Past Surgical History:   Procedure Laterality Date   • KNEE REPLACEMENT, TOTAL Left        Social History   Substance Use Topics   • Smoking status: Never Smoker   • Smokeless tobacco: Never Used   • Alcohol use No      Comment: holidays       Social History     Social History Narrative   • No narrative on file       Family History   Problem Relation Age of Onset   • Heart Disease Mother    • Heart Disease Father    • Lung Disease Sister    • Heart Disease Brother    • No Known Problems Brother    • Cancer Neg Hx    • Diabetes Neg Hx        ROS:     - Constitutional: Negative for fever, chills, and fatigue   - Eyes:  Negative blurry vision or eye pain    - ENT: Negative for ear pain, rhinorrhea, sinus congestion, sore throat    - Respiratory: Negative for cough or shortness of breath    - Cardiovascular: Negative for chest pain, palpitations    - Gastrointestinal: Negative for heartburn, nausea, vomiting, abdominal pain,     - Genitourinary: Negative for dysuria    - Musculoskeletal: Negative for myalgias    - Skin: Negative for rash, itching    - Neurological: Negative for headaches, dizziness    - Endo: Negative for increased thirst or polyuria    - Heme/Lymph: Does not bruise/bleed easily.     - Psychiatric/Behavioral: Negative for depression and anxiety   .  Exam: Blood pressure 110/68, pulse 70, temperature 36.6 °C (97.9 °F), resp. rate 12, height 1.524 m (5'), weight 58.1 kg (128 lb), SpO2 98 %. Body mass index is 25 kg/m².    General:  Normal appearing. No distress.  Eyes:  Eyes conjunctiva clear lids without ptosis, pupils equal and reactive to light accommodation,   ENMT:   ears normal shape and contour,  tympanic membranes are benign, nasal mucosa benign, oropharynx is without erythema, edema or exudates.   Neck:  Supple without JVD or bruit. Thyroid is not enlarged.  Pulmonary:  Clear to ausculation.  Normal effort. No rales, ronchi, or wheezing.  Cardiovascular:  Regular rate and rhythm without murmur.  Abdomen:  Soft, nontender, nondistended. Normal bowel sounds.  Neurologic:  No tremors  Lymph:  No cervical, supraclavicular  lymph nodes are palpable  Skin: multiple seborrheic keratoses  Warm and dry. Musculoskeletal:  Normal gait. No extremity cyanosis, clubbing, or edema.  Psych:   Normal mood and affect. Alert and oriented x3. Judgment and insight is normal.    Please note that this dictation was created using voice recognition software. I have made every reasonable attempt to correct obvious errors, but I expect that there are errors of grammar and possibly content that I did not discover before finalizing the note.      Assessment/Plan  1. Essential hypertension  Chronic medical condition.  Well controlled.  - amLODIPine (NORVASC) 5 MG Tab; Take 1 Tab by mouth every day.  Dispense: 90 Tab; Refill: 3  - losartan (COZAAR) 50 MG Tab; Take 1 Tab by mouth every day.  Dispense: 90 Tab; Refill: 3    2. Gastroesophageal reflux disease without esophagitis  Chronic medical condition.  Well controlled with Pepcid.  - famotidine (PEPCID) 40 MG Tab; Take 1 Tab by mouth every day.  Dispense: 90 Tab; Refill: 3    3. Pure hypercholesterolemia  Chronic medical condition.  Stable with atorvastatin.  - atorvastatin (LIPITOR) 20 MG Tab; Take 1 Tab by mouth every day.  Dispense: 90 Tab; Refill: 3    4. Hyperparathyroidism (HCC)  Chronic medical condition for which she continues to follow-up with nephrology.          Return in about 6 months (around 8/22/2019).

## 2019-04-19 ENCOUNTER — OFFICE VISIT (OUTPATIENT)
Dept: URGENT CARE | Facility: PHYSICIAN GROUP | Age: 84
End: 2019-04-19
Payer: COMMERCIAL

## 2019-04-19 VITALS
OXYGEN SATURATION: 96 % | BODY MASS INDEX: 23.56 KG/M2 | TEMPERATURE: 97.6 F | RESPIRATION RATE: 14 BRPM | DIASTOLIC BLOOD PRESSURE: 80 MMHG | HEART RATE: 68 BPM | SYSTOLIC BLOOD PRESSURE: 138 MMHG | WEIGHT: 120 LBS | HEIGHT: 60 IN

## 2019-04-19 DIAGNOSIS — L03.116 CELLULITIS OF LEFT LOWER EXTREMITY: ICD-10-CM

## 2019-04-19 PROCEDURE — 99214 OFFICE O/P EST MOD 30 MIN: CPT | Performed by: FAMILY MEDICINE

## 2019-04-19 RX ORDER — CEPHALEXIN 500 MG/1
500 CAPSULE ORAL 3 TIMES DAILY
Qty: 30 CAP | Refills: 0 | Status: SHIPPED | OUTPATIENT
Start: 2019-04-19 | End: 2019-04-29

## 2019-04-19 ASSESSMENT — PAIN SCALES - GENERAL: PAINLEVEL: 10=SEVERE PAIN

## 2019-04-19 ASSESSMENT — ENCOUNTER SYMPTOMS
FEVER: 0
NAUSEA: 0
WEAKNESS: 0
NUMBNESS: 0

## 2019-04-19 NOTE — PATIENT INSTRUCTIONS
Cellulitis, Adult  Cellulitis is a skin infection. The infected area is usually red and tender. This condition occurs most often in the arms and lower legs. The infection can travel to the muscles, blood, and underlying tissue and become serious. It is very important to get treated for this condition.  What are the causes?  Cellulitis is caused by bacteria. The bacteria enter through a break in the skin, such as a cut, burn, insect bite, open sore, or crack.  What increases the risk?  This condition is more likely to occur in people who:  · Have a weak defense system (immune system).  · Have open wounds on the skin such as cuts, burns, bites, and scrapes. Bacteria can enter the body through these open wounds.  · Are older.  · Have diabetes.  · Have a type of long-lasting (chronic) liver disease (cirrhosis) or kidney disease.  · Use IV drugs.  What are the signs or symptoms?  Symptoms of this condition include:  · Redness, streaking, or spotting on the skin.  · Swollen area of the skin.  · Tenderness or pain when an area of the skin is touched.  · Warm skin.  · Fever.  · Chills.  · Blisters.  How is this diagnosed?  This condition is diagnosed based on a medical history and physical exam. You may also have tests, including:  · Blood tests.  · Lab tests.  · Imaging tests.  How is this treated?  Treatment for this condition may include:  · Medicines, such as antibiotic medicines or antihistamines.  · Supportive care, such as rest and application of cold or warm cloths (cold or warm compresses) to the skin.  · Hospital care, if the condition is severe.  The infection usually gets better within 1-2 days of treatment.  Follow these instructions at home:  · Take over-the-counter and prescription medicines only as told by your health care provider.  · If you were prescribed an antibiotic medicine, take it as told by your health care provider. Do not stop taking the antibiotic even if you start to feel better.  · Drink  enough fluid to keep your urine clear or pale yellow.  · Do not touch or rub the infected area.  · Raise (elevate) the infected area above the level of your heart while you are sitting or lying down.  · Apply warm or cold compresses to the affected area as told by your health care provider.  · Keep all follow-up visits as told by your health care provider. This is important. These visits let your health care provider make sure a more serious infection is not developing.  Contact a health care provider if:  · You have a fever.  · Your symptoms do not improve within 1-2 days of starting treatment.  · Your bone or joint underneath the infected area becomes painful after the skin has healed.  · Your infection returns in the same area or another area.  · You notice a swollen bump in the infected area.  · You develop new symptoms.  · You have a general ill feeling (malaise) with muscle aches and pains.  Get help right away if:  · Your symptoms get worse.  · You feel very sleepy.  · You develop vomiting or diarrhea that persists.  · You notice red streaks coming from the infected area.  · Your red area gets larger or turns dark in color.  This information is not intended to replace advice given to you by your health care provider. Make sure you discuss any questions you have with your health care provider.  Document Released: 09/27/2006 Document Revised: 04/27/2017 Document Reviewed: 10/26/2016  [employer] Interactive Patient Education © 2017 [employer]

## 2019-04-19 NOTE — PROGRESS NOTES
Subjective:   Delphine Reeves is a 88 y.o. female who presents for Foot Problem (Right foot infection)        Foot Problem   This is a new problem. The current episode started in the past 7 days. The problem occurs constantly. The problem has been rapidly worsening. Pertinent negatives include no fever, nausea, numbness or weakness.     Review of Systems   Constitutional: Negative for fever.   Gastrointestinal: Negative for nausea.   Neurological: Negative for weakness and numbness.     No Known Allergies   Objective:   /80   Pulse 68   Temp 36.4 °C (97.6 °F)   Resp 14   Ht 1.524 m (5')   Wt 54.4 kg (120 lb)   SpO2 96%   BMI 23.44 kg/m²   Physical Exam   Constitutional: She is oriented to person, place, and time. She appears well-developed and well-nourished. No distress.   Eyes: Pupils are equal, round, and reactive to light. EOM are normal.   Cardiovascular: Normal rate, regular rhythm, normal heart sounds and intact distal pulses.    Pulmonary/Chest: Effort normal and breath sounds normal. No respiratory distress.   Abdominal: Soft. Bowel sounds are normal. She exhibits no distension.   Musculoskeletal: Normal range of motion.   Neurological: She is alert and oriented to person, place, and time. She has normal reflexes.   Skin: Skin is warm and dry.        Psychiatric: She has a normal mood and affect. Her behavior is normal.         Assessment/Plan:   1. Cellulitis of left lower extremity  - cephALEXin (KEFLEX) 500 MG Cap; Take 1 Cap by mouth 3 times a day for 10 days.  Dispense: 30 Cap; Refill: 0    Differential diagnosis, natural history, supportive care, and indications for immediate follow-up discussed.

## 2019-08-19 ENCOUNTER — OFFICE VISIT (OUTPATIENT)
Dept: MEDICAL GROUP | Facility: PHYSICIAN GROUP | Age: 84
End: 2019-08-19
Payer: COMMERCIAL

## 2019-08-19 ENCOUNTER — HOSPITAL ENCOUNTER (OUTPATIENT)
Facility: MEDICAL CENTER | Age: 84
End: 2019-08-19
Attending: FAMILY MEDICINE
Payer: COMMERCIAL

## 2019-08-19 VITALS
DIASTOLIC BLOOD PRESSURE: 89 MMHG | TEMPERATURE: 97.9 F | WEIGHT: 126 LBS | SYSTOLIC BLOOD PRESSURE: 142 MMHG | OXYGEN SATURATION: 96 % | HEIGHT: 60 IN | BODY MASS INDEX: 24.74 KG/M2 | HEART RATE: 71 BPM

## 2019-08-19 DIAGNOSIS — I10 ESSENTIAL HYPERTENSION: ICD-10-CM

## 2019-08-19 DIAGNOSIS — R30.0 DYSURIA: Primary | ICD-10-CM

## 2019-08-19 DIAGNOSIS — Z23 NEED FOR VACCINATION: ICD-10-CM

## 2019-08-19 DIAGNOSIS — R30.0 DYSURIA: ICD-10-CM

## 2019-08-19 DIAGNOSIS — N18.30 CKD (CHRONIC KIDNEY DISEASE) STAGE 3, GFR 30-59 ML/MIN (HCC): ICD-10-CM

## 2019-08-19 LAB
APPEARANCE UR: NORMAL
BILIRUB UR STRIP-MCNC: NORMAL MG/DL
COLOR UR AUTO: YELLOW
GLUCOSE UR STRIP.AUTO-MCNC: NORMAL MG/DL
KETONES UR STRIP.AUTO-MCNC: NORMAL MG/DL
LEUKOCYTE ESTERASE UR QL STRIP.AUTO: NORMAL
NITRITE UR QL STRIP.AUTO: NORMAL
PH UR STRIP.AUTO: 5.5 [PH] (ref 5–8)
PROT UR QL STRIP: NORMAL MG/DL
RBC UR QL AUTO: NORMAL
SP GR UR STRIP.AUTO: 1.02
UROBILINOGEN UR STRIP-MCNC: NORMAL MG/DL

## 2019-08-19 PROCEDURE — 90715 TDAP VACCINE 7 YRS/> IM: CPT | Performed by: FAMILY MEDICINE

## 2019-08-19 PROCEDURE — 90471 IMMUNIZATION ADMIN: CPT | Performed by: FAMILY MEDICINE

## 2019-08-19 PROCEDURE — 87186 SC STD MICRODIL/AGAR DIL: CPT

## 2019-08-19 PROCEDURE — 81002 URINALYSIS NONAUTO W/O SCOPE: CPT | Performed by: FAMILY MEDICINE

## 2019-08-19 PROCEDURE — 87077 CULTURE AEROBIC IDENTIFY: CPT

## 2019-08-19 PROCEDURE — 87086 URINE CULTURE/COLONY COUNT: CPT

## 2019-08-19 PROCEDURE — 99214 OFFICE O/P EST MOD 30 MIN: CPT | Mod: 25 | Performed by: FAMILY MEDICINE

## 2019-08-19 RX ORDER — CEFDINIR 300 MG/1
300 CAPSULE ORAL 2 TIMES DAILY
Qty: 10 QUANTITY SUFFICIENT | Refills: 0 | Status: SHIPPED | OUTPATIENT
Start: 2019-08-19 | End: 2020-01-06

## 2019-08-19 ASSESSMENT — PAIN SCALES - GENERAL: PAINLEVEL: NO PAIN

## 2019-08-19 NOTE — PROGRESS NOTES
cc: Dysuria    Subjective:     Delphine Reeves is a 89 y.o. female presenting for six-month follow-up as well as to discuss her dysuria.    1. Dysuria  Is a new medical problem which is been going on for the last 5 days.  She does have a history of UTIs.  Last one was in December 2018.  Denies any fever or chills.  Continues to complain of burning with urination.    2. CKD (chronic kidney disease) stage 3, GFR 30-59 ml/min ([facility])  Chronic medical problem.  He needs to follow-up with nephrology, Dr. Hernandez.  Has an appointment with him next month.  Currently taking Sensipar 60 mg 3 times a week on Monday, Wednesday, and Friday.    3. Essential hypertension  Chronic medical problem.  Currently taking amlodipine 5 mg and losartan 50 mg daily.  Denies any headaches, chest pain or shortness of breath.    4. Need for vaccination  She will receive her Tdap vaccine today.  She is also interested in receiving the Shingrix in the future.      Review of systems:  See above and negative for abdominal pain, nausea vomiting.  No Known Allergies      Current Outpatient Medications:   •  cefdinir (OMNICEF) 300 MG Cap, Take 1 Cap by mouth 2 times a day., Disp: 10 Quantity Sufficient, Rfl: 0  •  SENSIPAR 60 MG Tab, Take 1 Tab by mouth every day., Disp: , Rfl: 7  •  Cholecalciferol (VITAMIN D-3) 5000 units Tab, Take  by mouth., Disp: , Rfl:   •  amLODIPine (NORVASC) 5 MG Tab, Take 1 Tab by mouth every day., Disp: 90 Tab, Rfl: 3  •  atorvastatin (LIPITOR) 20 MG Tab, Take 1 Tab by mouth every day., Disp: 90 Tab, Rfl: 3  •  famotidine (PEPCID) 40 MG Tab, Take 1 Tab by mouth every day., Disp: 90 Tab, Rfl: 3  •  losartan (COZAAR) 50 MG Tab, Take 1 Tab by mouth every day., Disp: 90 Tab, Rfl: 3    Allergies, past medical history, past surgical history, family history, social history reviewed and updated    Objective:     Vitals: /89 (BP Location: Left arm, Patient Position: Sitting, BP Cuff Size: Adult)   Pulse 71   Temp 36.6 °C (97.9  °F)   Ht 1.524 m (5')   Wt 57.2 kg (126 lb)   SpO2 96%   BMI 24.61 kg/m²   General:  Alert, pleasant, NAD  Eyes:  normal inspection of conjunctivae and lids, EOMI,   ENMT:  External ears and nose are normal.    Neck  supple,   Heart:  Regular rate and rhythm,  No LE edema  Respiratory:  Normal respiratory effort, Clear to auscultation bilaterally.  Abdomen:   soft, Non-distended,   Skin:  Warm, dry, no rashes,   Musculoskeletal:  Normal gait, Normal digits and nails.  Neurological: No tremors,   Psych:   Affect/mood is normal, judgement is good, memory is intact, grooming is appropriate.    Assessment/Plan:     Delphine was seen today for dysuria.    Diagnoses and all orders for this visit:    Dysuria  New medical problem.  Will send urine for culture.  -     cefdinir (OMNICEF) 300 MG Cap; Take 1 Cap by mouth 2 times a day.  -     POCT Urinalysis    CKD (chronic kidney disease) stage 3, GFR 30-59 ml/min ([facility])  Chronic medical problem.  Stable.  Follow-up with nephrology next month  -     Comp Metabolic Panel; Future  -     CBC WITH DIFFERENTIAL; Future    Essential hypertension  -Chronic medical problem.  Stable with meds.       Lipid Profile; Future    Need for vaccination  -     Tdap =>8yo IM          Return in about 4 months (around 12/19/2019) for ckd.

## 2019-08-19 NOTE — ASSESSMENT & PLAN NOTE
Is a chronic medical problem for which she continues to follow-up with Dr. Hernandez.  Currently taking Sensipar 60 mg every Monday Wednesday and Friday.  Mentions that this dose was decreased approximately 6 months ago.  Has a follow-up with Dr. Hernandez next month.

## 2019-08-19 NOTE — ASSESSMENT & PLAN NOTE
With a new medical problem which is been going on for the last 5 days.  She is very thin lots of burning with urination.  Mentions that she has had urinary tract infection, past previous one was over a year ago.  Urine test in the office is positive for trace ketones, trace blood, 30 protein, large leuk esterase, and cloudy in appearance.

## 2019-08-22 LAB
BACTERIA UR CULT: ABNORMAL
BACTERIA UR CULT: ABNORMAL
SIGNIFICANT IND 70042: ABNORMAL
SITE SITE: ABNORMAL
SOURCE SOURCE: ABNORMAL

## 2019-08-31 ENCOUNTER — HOSPITAL ENCOUNTER (OUTPATIENT)
Dept: LAB | Facility: MEDICAL CENTER | Age: 84
End: 2019-08-31
Attending: INTERNAL MEDICINE
Payer: COMMERCIAL

## 2019-08-31 LAB
25(OH)D3 SERPL-MCNC: 59 NG/ML (ref 30–100)
ALBUMIN SERPL BCP-MCNC: 4.2 G/DL (ref 3.2–4.9)
BUN SERPL-MCNC: 44 MG/DL (ref 8–22)
CALCIUM SERPL-MCNC: 8.9 MG/DL (ref 8.5–10.5)
CHLORIDE SERPL-SCNC: 106 MMOL/L (ref 96–112)
CO2 SERPL-SCNC: 27 MMOL/L (ref 20–33)
CREAT SERPL-MCNC: 2.12 MG/DL (ref 0.5–1.4)
GLUCOSE SERPL-MCNC: 102 MG/DL (ref 65–99)
PHOSPHATE SERPL-MCNC: 4.1 MG/DL (ref 2.5–4.5)
POTASSIUM SERPL-SCNC: 4.3 MMOL/L (ref 3.6–5.5)
PTH-INTACT SERPL-MCNC: 62 PG/ML (ref 14–72)
SODIUM SERPL-SCNC: 141 MMOL/L (ref 135–145)

## 2019-08-31 PROCEDURE — 82306 VITAMIN D 25 HYDROXY: CPT

## 2019-08-31 PROCEDURE — 83970 ASSAY OF PARATHORMONE: CPT

## 2019-08-31 PROCEDURE — 80069 RENAL FUNCTION PANEL: CPT

## 2019-08-31 PROCEDURE — 36415 COLL VENOUS BLD VENIPUNCTURE: CPT

## 2019-11-30 ENCOUNTER — HOSPITAL ENCOUNTER (OUTPATIENT)
Dept: LAB | Facility: MEDICAL CENTER | Age: 84
End: 2019-11-30
Attending: INTERNAL MEDICINE
Payer: MEDICARE

## 2019-11-30 LAB
ALBUMIN SERPL BCP-MCNC: 4.4 G/DL (ref 3.2–4.9)
APPEARANCE UR: ABNORMAL
BACTERIA #/AREA URNS HPF: ABNORMAL /HPF
BILIRUB UR QL STRIP.AUTO: NEGATIVE
BUN SERPL-MCNC: 26 MG/DL (ref 8–22)
CALCIUM SERPL-MCNC: 9 MG/DL (ref 8.5–10.5)
CHLORIDE SERPL-SCNC: 108 MMOL/L (ref 96–112)
CO2 SERPL-SCNC: 24 MMOL/L (ref 20–33)
COLOR UR: ABNORMAL
CREAT SERPL-MCNC: 1.75 MG/DL (ref 0.5–1.4)
CREAT UR-MCNC: 125.7 MG/DL
EPI CELLS #/AREA URNS HPF: ABNORMAL /HPF
GLUCOSE SERPL-MCNC: 99 MG/DL (ref 65–99)
GLUCOSE UR STRIP.AUTO-MCNC: NEGATIVE MG/DL
HYALINE CASTS #/AREA URNS LPF: ABNORMAL /LPF
KETONES UR STRIP.AUTO-MCNC: NEGATIVE MG/DL
LEUKOCYTE ESTERASE UR QL STRIP.AUTO: ABNORMAL
MICRO URNS: ABNORMAL
NITRITE UR QL STRIP.AUTO: POSITIVE
PH UR STRIP.AUTO: 6.5 [PH] (ref 5–8)
PHOSPHATE SERPL-MCNC: 2.5 MG/DL (ref 2.5–4.5)
POTASSIUM SERPL-SCNC: 4.5 MMOL/L (ref 3.6–5.5)
PROT UR QL STRIP: 100 MG/DL
PROT UR-MCNC: 70.9 MG/DL (ref 0–15)
PROT/CREAT UR: 564 MG/G (ref 10–107)
PTH-INTACT SERPL-MCNC: 139.3 PG/ML (ref 14–72)
RBC # URNS HPF: ABNORMAL /HPF
RBC UR QL AUTO: NEGATIVE
SODIUM SERPL-SCNC: 143 MMOL/L (ref 135–145)
SP GR UR STRIP.AUTO: 1.02
UROBILINOGEN UR STRIP.AUTO-MCNC: 1 MG/DL
WBC #/AREA URNS HPF: ABNORMAL /HPF

## 2019-11-30 PROCEDURE — 81001 URINALYSIS AUTO W/SCOPE: CPT

## 2019-11-30 PROCEDURE — 82570 ASSAY OF URINE CREATININE: CPT

## 2019-11-30 PROCEDURE — 84156 ASSAY OF PROTEIN URINE: CPT

## 2019-11-30 PROCEDURE — 36415 COLL VENOUS BLD VENIPUNCTURE: CPT

## 2019-11-30 PROCEDURE — 83970 ASSAY OF PARATHORMONE: CPT

## 2019-11-30 PROCEDURE — 80069 RENAL FUNCTION PANEL: CPT

## 2020-01-06 ENCOUNTER — OFFICE VISIT (OUTPATIENT)
Dept: MEDICAL GROUP | Facility: PHYSICIAN GROUP | Age: 85
End: 2020-01-06
Payer: COMMERCIAL

## 2020-01-06 VITALS
SYSTOLIC BLOOD PRESSURE: 130 MMHG | HEART RATE: 71 BPM | DIASTOLIC BLOOD PRESSURE: 68 MMHG | WEIGHT: 128 LBS | TEMPERATURE: 98.7 F | HEIGHT: 60 IN | BODY MASS INDEX: 25.13 KG/M2 | OXYGEN SATURATION: 96 %

## 2020-01-06 DIAGNOSIS — E78.00 PURE HYPERCHOLESTEROLEMIA: ICD-10-CM

## 2020-01-06 DIAGNOSIS — K21.9 GASTROESOPHAGEAL REFLUX DISEASE WITHOUT ESOPHAGITIS: ICD-10-CM

## 2020-01-06 DIAGNOSIS — E21.3 HYPERPARATHYROIDISM (HCC): ICD-10-CM

## 2020-01-06 DIAGNOSIS — I10 ESSENTIAL HYPERTENSION: ICD-10-CM

## 2020-01-06 DIAGNOSIS — N18.30 CKD (CHRONIC KIDNEY DISEASE) STAGE 3, GFR 30-59 ML/MIN: ICD-10-CM

## 2020-01-06 PROBLEM — R30.0 DYSURIA: Status: RESOLVED | Noted: 2017-11-14 | Resolved: 2020-01-06

## 2020-01-06 PROCEDURE — 99214 OFFICE O/P EST MOD 30 MIN: CPT | Performed by: FAMILY MEDICINE

## 2020-01-06 RX ORDER — INFLUENZA A VIRUS A/MICHIGAN/45/2015 X-275 (H1N1) ANTIGEN (FORMALDEHYDE INACTIVATED), INFLUENZA A VIRUS A/SINGAPORE/INFIMH-16-0019/2016 IVR-186 (H3N2) ANTIGEN (FORMALDEHYDE INACTIVATED), AND INFLUENZA B VIRUS B/MARYLAND/15/2016 BX-69A (A B/COLORADO/6/2017-LIKE VIRUS) ANTIGEN (FORMALDEHYDE INACTIVATED) 60; 60; 60 UG/.5ML; UG/.5ML; UG/.5ML
INJECTION, SUSPENSION INTRAMUSCULAR
Refills: 0 | COMMUNITY
Start: 2019-11-09 | End: 2020-06-10

## 2020-01-06 ASSESSMENT — PAIN SCALES - GENERAL: PAINLEVEL: NO PAIN

## 2020-01-06 NOTE — LETTER
[facility]  [address] [address]     January 6, 2020    Patient: Delphine Reeves   YOB: 1930   Date of Visit: 1/6/2020       To Whom It May Concern:    Delphine Reeves  is currently under my care.  She was seen in my office today, January 6, 2020.  She should not be lifting anything over 20 pounds.  If you have any further questions or concerns, please call my office.  Thank you.     Sincerely,       Lorie Kumar M.D.

## 2020-01-07 DIAGNOSIS — K21.9 GASTROESOPHAGEAL REFLUX DISEASE WITHOUT ESOPHAGITIS: ICD-10-CM

## 2020-01-07 DIAGNOSIS — E78.00 PURE HYPERCHOLESTEROLEMIA: ICD-10-CM

## 2020-01-07 DIAGNOSIS — I10 ESSENTIAL HYPERTENSION: ICD-10-CM

## 2020-01-07 RX ORDER — LOSARTAN POTASSIUM 50 MG/1
50 TABLET ORAL DAILY
Qty: 90 TAB | Refills: 3 | Status: SHIPPED | OUTPATIENT
Start: 2020-01-07 | End: 2020-11-10 | Stop reason: SDUPTHER

## 2020-01-07 RX ORDER — ATORVASTATIN CALCIUM 20 MG/1
20 TABLET, FILM COATED ORAL DAILY
Qty: 90 TAB | Refills: 3 | Status: SHIPPED | OUTPATIENT
Start: 2020-01-07 | End: 2020-11-10 | Stop reason: SDUPTHER

## 2020-01-07 RX ORDER — AMLODIPINE BESYLATE 5 MG/1
5 TABLET ORAL DAILY
Qty: 90 TAB | Refills: 3 | Status: SHIPPED | OUTPATIENT
Start: 2020-01-07 | End: 2020-11-10 | Stop reason: SDUPTHER

## 2020-01-07 RX ORDER — FAMOTIDINE 40 MG/1
40 TABLET, FILM COATED ORAL DAILY
Qty: 90 TAB | Refills: 3 | Status: SHIPPED | OUTPATIENT
Start: 2020-01-07 | End: 2020-11-10 | Stop reason: SDUPTHER

## 2020-01-07 NOTE — PROGRESS NOTES
cc: Follow-up and needs refills    Subjective:     Delphine Reeves is a 89 y.o. female presenting for a 4-month follow-up of her hypertension and would like to obtain refills.  She recently switched insurance companies and is unclear which mail order pharmacy to use.  She plans on checking with her insurance company and then letting us know.  She is also requesting a work note for [employer] requesting that she not be allowed to lift more than 20 pounds.  Previously she was getting these notes through her GYN, Dr. Prajapati as she has had multiple bladder lifts.  Mentions that he has since retired and I would be able to provide a note.    1. Essential hypertension  Chronic medical diagnosis.  Blood pressure today is 130/68.  Denies any headaches or chest pain.  Continues to take losartan 50 mg, amlodipine 5 mg.     2. Hyperparathyroidism (HCC)  3. CKD (chronic kidney disease) stage 3, GFR 30-59 ml/min ([facility])  Chronic medical problems for which she continues to follow-up with nephrology, Dr. Hernandez.  She just recently saw him and has a follow-up with him again in 6 months.  Mentions that she continues to get her Sensipar 60 mg through his office but all other medications are through us.  Denies any flank pain or dysuria.  Recent labs from November had creatinine 1.75 and GFR 27.    4. Gastroesophageal reflux disease without esophagitis  Chronic medical diagnosis.  Currently taking Pepcid 40 mg daily.  Denies any nausea vomiting or heartburn.    5. Pure hypercholesterolemia  Chronic medical diagnosis.  Currently taking atorvastatin 20 mg daily.  Recent labs from May 2018 have total cholesterol 139, triglycerides 142, HDL 49, and LDL 62.      Review of systems:  See above and negative for fever and chills.  No Known Allergies      Current Outpatient Medications:   •  SENSIPAR 60 MG Tab, Take 1 Tab by mouth every day., Disp: , Rfl: 7  •  Cholecalciferol (VITAMIN D-3) 5000 units Tab, Take  by mouth.,  Disp: , Rfl:   •  amLODIPine (NORVASC) 5 MG Tab, Take 1 Tab by mouth every day., Disp: 90 Tab, Rfl: 3  •  atorvastatin (LIPITOR) 20 MG Tab, Take 1 Tab by mouth every day., Disp: 90 Tab, Rfl: 3  •  famotidine (PEPCID) 40 MG Tab, Take 1 Tab by mouth every day., Disp: 90 Tab, Rfl: 3  •  losartan (COZAAR) 50 MG Tab, Take 1 Tab by mouth every day., Disp: 90 Tab, Rfl: 3  •  FLUZONE HIGH-DOSE 0.5 ML Suspension Prefilled Syringe injection, PHARMACIST ADMINISTERED IMMUNIZATION ADMINISTERED AT TIME OF DISPENSING, Disp: , Rfl: 0    Allergies, past medical history, past surgical history, family history, social history reviewed and updated    Objective:     Vitals: /68 (BP Location: Left arm, Patient Position: Sitting, BP Cuff Size: Adult)   Pulse 71   Temp 37.1 °C (98.7 °F)   Ht 1.524 m (5')   Wt 58.1 kg (128 lb)   SpO2 96%   BMI 25.00 kg/m²   General:  Alert, pleasant, NAD  Eyes:  normal inspection of conjunctivae and lids, EOMI,   ENMT:  External ears and nose are normal.    Neck  supple,   Heart:  Regular rate and rhythm,  No LE edema  Respiratory:  Normal respiratory effort, Clear to auscultation bilaterally.  Abdomen:   soft, Non-distended,   Skin:  Warm, dry, no rashes,   Musculoskeletal:  Normal gait, Normal digits and nails.  Neurological: No tremors,   Psych:   Affect/mood is normal, judgement is good, memory is intact, grooming is appropriate.    Assessment/Plan:     Delphine was seen today for chronic kidney disease.    Diagnoses and all orders for this visit:    Essential hypertension  Chronic medical problem.  Stable.  Continue with current meds.    Hyperparathyroidism (HCC)  CKD (chronic kidney disease) stage 3, GFR 30-59 ml/min (HCC)  Chronic medical diagnosis.  Continue with Sensipar 60 mg.  Continue to follow-up with nephrology.  Her labs were anemic approximately a year ago.  Most likely secondary to chronic kidney disease.  We will recheck CBC with next set of labs.  -     CBC WITH DIFFERENTIAL;  Future    Gastroesophageal reflux disease without esophagitis  Chronic medical diagnosis.  Stable with Pepcid.    Pure hypercholesterolemia  Chronic medical diagnosis.  Stable with Lipitor 20 mg.  -     Lipid Profile; Future    Follow-up with me in 3 months.      Return in about 3 months (around 4/6/2020) for hyperlipidemia.  This note was created using voice recognition software (Dragon). The accuracy of the dictation is limited by the abilities of the software. I have reviewed the note prior to signing, however some errors in grammar and context are still possible. If you have any questions related to this note please do not hesitate to contact our office.

## 2020-01-07 NOTE — TELEPHONE ENCOUNTER
Requested Prescriptions     Pending Prescriptions Disp Refills   • amLODIPine (NORVASC) 5 MG Tab 90 Tab 3     Sig: Take 1 Tab by mouth every day.   • atorvastatin (LIPITOR) 20 MG Tab 90 Tab 3     Sig: Take 1 Tab by mouth every day.   • famotidine (PEPCID) 40 MG Tab 90 Tab 3     Sig: Take 1 Tab by mouth every day.   • losartan (COZAAR) 50 MG Tab 90 Tab 3     Sig: Take 1 Tab by mouth every day.   Lorie Kumar M.D.

## 2020-01-07 NOTE — TELEPHONE ENCOUNTER
Was the patient seen in the last year in this department? Yes    Does patient have an active prescription for medications requested? No     Received Request Via: Pharmacy New Mail order service

## 2020-05-23 ENCOUNTER — HOSPITAL ENCOUNTER (OUTPATIENT)
Dept: LAB | Facility: MEDICAL CENTER | Age: 85
End: 2020-05-23
Attending: NURSE PRACTITIONER
Payer: MEDICARE

## 2020-05-23 LAB
25(OH)D3 SERPL-MCNC: 87 NG/ML (ref 30–100)
ALBUMIN SERPL BCP-MCNC: 4.3 G/DL (ref 3.2–4.9)
BUN SERPL-MCNC: 37 MG/DL (ref 8–22)
CALCIUM SERPL-MCNC: 9.7 MG/DL (ref 8.5–10.5)
CHLORIDE SERPL-SCNC: 106 MMOL/L (ref 96–112)
CO2 SERPL-SCNC: 26 MMOL/L (ref 20–33)
CREAT SERPL-MCNC: 1.6 MG/DL (ref 0.5–1.4)
GLUCOSE SERPL-MCNC: 101 MG/DL (ref 65–99)
PHOSPHATE SERPL-MCNC: 3.1 MG/DL (ref 2.5–4.5)
POTASSIUM SERPL-SCNC: 4.4 MMOL/L (ref 3.6–5.5)
PTH-INTACT SERPL-MCNC: 58.7 PG/ML (ref 14–72)
SODIUM SERPL-SCNC: 146 MMOL/L (ref 135–145)

## 2020-05-23 PROCEDURE — 80069 RENAL FUNCTION PANEL: CPT

## 2020-05-23 PROCEDURE — 82306 VITAMIN D 25 HYDROXY: CPT

## 2020-05-23 PROCEDURE — 83970 ASSAY OF PARATHORMONE: CPT

## 2020-05-23 PROCEDURE — 36415 COLL VENOUS BLD VENIPUNCTURE: CPT

## 2020-05-27 ENCOUNTER — HOSPITAL ENCOUNTER (OUTPATIENT)
Dept: LAB | Facility: MEDICAL CENTER | Age: 85
End: 2020-05-27
Attending: FAMILY MEDICINE
Payer: COMMERCIAL

## 2020-05-27 DIAGNOSIS — N18.30 CKD (CHRONIC KIDNEY DISEASE) STAGE 3, GFR 30-59 ML/MIN: ICD-10-CM

## 2020-05-27 DIAGNOSIS — E78.00 PURE HYPERCHOLESTEROLEMIA: ICD-10-CM

## 2020-05-27 LAB
ALBUMIN SERPL BCP-MCNC: 4.2 G/DL (ref 3.2–4.9)
ALBUMIN/GLOB SERPL: 1.8 G/DL
ALP SERPL-CCNC: 74 U/L (ref 30–99)
ALT SERPL-CCNC: 14 U/L (ref 2–50)
ANION GAP SERPL CALC-SCNC: 10 MMOL/L (ref 7–16)
AST SERPL-CCNC: 16 U/L (ref 12–45)
BASOPHILS # BLD AUTO: 0.3 % (ref 0–1.8)
BASOPHILS # BLD: 0.01 K/UL (ref 0–0.12)
BILIRUB SERPL-MCNC: 0.4 MG/DL (ref 0.1–1.5)
BUN SERPL-MCNC: 38 MG/DL (ref 8–22)
CALCIUM SERPL-MCNC: 9.5 MG/DL (ref 8.5–10.5)
CHLORIDE SERPL-SCNC: 104 MMOL/L (ref 96–112)
CHOLEST SERPL-MCNC: 164 MG/DL (ref 100–199)
CO2 SERPL-SCNC: 26 MMOL/L (ref 20–33)
CREAT SERPL-MCNC: 1.55 MG/DL (ref 0.5–1.4)
EOSINOPHIL # BLD AUTO: 0.01 K/UL (ref 0–0.51)
EOSINOPHIL NFR BLD: 0.3 % (ref 0–6.9)
ERYTHROCYTE [DISTWIDTH] IN BLOOD BY AUTOMATED COUNT: 48.3 FL (ref 35.9–50)
FASTING STATUS PATIENT QL REPORTED: NORMAL
GLOBULIN SER CALC-MCNC: 2.4 G/DL (ref 1.9–3.5)
GLUCOSE SERPL-MCNC: 103 MG/DL (ref 65–99)
HCT VFR BLD AUTO: 35.9 % (ref 37–47)
HDLC SERPL-MCNC: 50 MG/DL
HGB BLD-MCNC: 11.4 G/DL (ref 12–16)
IMM GRANULOCYTES # BLD AUTO: 0.01 K/UL (ref 0–0.11)
IMM GRANULOCYTES NFR BLD AUTO: 0.3 % (ref 0–0.9)
LDLC SERPL CALC-MCNC: 95 MG/DL
LYMPHOCYTES # BLD AUTO: 1.15 K/UL (ref 1–4.8)
LYMPHOCYTES NFR BLD: 33.5 % (ref 22–41)
MCH RBC QN AUTO: 30.5 PG (ref 27–33)
MCHC RBC AUTO-ENTMCNC: 31.8 G/DL (ref 33.6–35)
MCV RBC AUTO: 96 FL (ref 81.4–97.8)
MONOCYTES # BLD AUTO: 0.52 K/UL (ref 0–0.85)
MONOCYTES NFR BLD AUTO: 15.2 % (ref 0–13.4)
NEUTROPHILS # BLD AUTO: 1.73 K/UL (ref 2–7.15)
NEUTROPHILS NFR BLD: 50.4 % (ref 44–72)
NRBC # BLD AUTO: 0 K/UL
NRBC BLD-RTO: 0 /100 WBC
PLATELET # BLD AUTO: 146 K/UL (ref 164–446)
PMV BLD AUTO: 12 FL (ref 9–12.9)
POTASSIUM SERPL-SCNC: 4.3 MMOL/L (ref 3.6–5.5)
PROT SERPL-MCNC: 6.6 G/DL (ref 6–8.2)
RBC # BLD AUTO: 3.74 M/UL (ref 4.2–5.4)
SODIUM SERPL-SCNC: 140 MMOL/L (ref 135–145)
TRIGL SERPL-MCNC: 94 MG/DL (ref 0–149)
WBC # BLD AUTO: 3.4 K/UL (ref 4.8–10.8)

## 2020-05-27 PROCEDURE — 80061 LIPID PANEL: CPT

## 2020-05-27 PROCEDURE — 36415 COLL VENOUS BLD VENIPUNCTURE: CPT

## 2020-05-27 PROCEDURE — 85025 COMPLETE CBC W/AUTO DIFF WBC: CPT

## 2020-05-27 PROCEDURE — 80053 COMPREHEN METABOLIC PANEL: CPT

## 2020-06-02 ENCOUNTER — PATIENT OUTREACH (OUTPATIENT)
Dept: SCHEDULING | Facility: IMAGING CENTER | Age: 85
End: 2020-06-02

## 2020-06-10 ENCOUNTER — OFFICE VISIT (OUTPATIENT)
Dept: MEDICAL GROUP | Facility: PHYSICIAN GROUP | Age: 85
End: 2020-06-10
Payer: COMMERCIAL

## 2020-06-10 VITALS
OXYGEN SATURATION: 97 % | WEIGHT: 121 LBS | HEIGHT: 60 IN | DIASTOLIC BLOOD PRESSURE: 74 MMHG | TEMPERATURE: 97.7 F | BODY MASS INDEX: 23.75 KG/M2 | HEART RATE: 80 BPM | RESPIRATION RATE: 12 BRPM | SYSTOLIC BLOOD PRESSURE: 146 MMHG

## 2020-06-10 DIAGNOSIS — E78.00 PURE HYPERCHOLESTEROLEMIA: ICD-10-CM

## 2020-06-10 DIAGNOSIS — Z23 NEED FOR VACCINATION: ICD-10-CM

## 2020-06-10 DIAGNOSIS — D70.8 OTHER NEUTROPENIA (HCC): ICD-10-CM

## 2020-06-10 DIAGNOSIS — N18.30 CKD (CHRONIC KIDNEY DISEASE) STAGE 3, GFR 30-59 ML/MIN: ICD-10-CM

## 2020-06-10 DIAGNOSIS — I10 ESSENTIAL HYPERTENSION: ICD-10-CM

## 2020-06-10 PROCEDURE — 99214 OFFICE O/P EST MOD 30 MIN: CPT | Performed by: FAMILY MEDICINE

## 2020-06-10 RX ORDER — ZOSTER VACCINE RECOMBINANT, ADJUVANTED 50 MCG/0.5
0.5 KIT INTRAMUSCULAR ONCE
Qty: 0.5 ML | Refills: 0 | Status: SHIPPED | OUTPATIENT
Start: 2020-06-10 | End: 2020-06-10

## 2020-06-10 ASSESSMENT — FIBROSIS 4 INDEX: FIB4 SCORE: 2.64

## 2020-06-10 ASSESSMENT — PATIENT HEALTH QUESTIONNAIRE - PHQ9: CLINICAL INTERPRETATION OF PHQ2 SCORE: 0

## 2020-06-10 NOTE — PROGRESS NOTES
cc: Follow-up for blood pressure    Subjective:     Delphine Reeves is a 89 y.o. female presenting for follow-up of blood pressure    1. Essential hypertension  Chronic medical condition. BP today is 146/74.  Checks BP at home with range of 125/71. Currently taking amlodipine 5 mg and losartan 50 mg daily.  Denies symptoms of chest pain, headaches, or shortness of breath.       2. Pure hypercholesterolemia  chronic.  Recent labs in may have stable cholesterol at 164.  Taking lipitor 20mg daily. No LE cramps.    3. CKD (chronic kidney disease) stage 3, GFR 30-59 ml/min ([facility])  Chronic.  Continues to f/u with nephrology, Dr amador.  Recent labs have improvement in GFR to 31.  F/u with him every 4 months, next appt in October.  Denies any dysuria or hematuria.    4.  Neutropenia  Chronic medical finding.  Recent labs from May show decreased white blood cell count at 3.4 with decreased absolute neutrophils and increased monocytes.  She has had decreased white blood cells over the last 4 years.  Recent labs also show anemia with hemoglobin hematocrit 11.4 and 35.9 as well as platelet counts decreased at 146,000.  Denies any frequent infections or fatigue.  States that she feels well.    Vaccines discussed with patient and she'll receive prescription for shingrix.     Review of systems:  See above No Known Allergies      Current Outpatient Medications:   •  Zoster Vac Recomb Adjuvanted (SHINGRIX) 50 MCG/0.5ML Recon Susp, 0.5 mL by Intramuscular route Once for 1 dose., Disp: 0.5 mL, Rfl: 0  •  amLODIPine (NORVASC) 5 MG Tab, Take 1 Tab by mouth every day., Disp: 90 Tab, Rfl: 3  •  atorvastatin (LIPITOR) 20 MG Tab, Take 1 Tab by mouth every day., Disp: 90 Tab, Rfl: 3  •  famotidine (PEPCID) 40 MG Tab, Take 1 Tab by mouth every day., Disp: 90 Tab, Rfl: 3  •  losartan (COZAAR) 50 MG Tab, Take 1 Tab by mouth every day., Disp: 90 Tab, Rfl: 3  •  SENSIPAR 60 MG Tab, Take 1 Tab by mouth every day., Disp: , Rfl: 7  •   Cholecalciferol (VITAMIN D-3) 5000 units Tab, Take  by mouth., Disp: , Rfl:     Allergies, past medical history, past surgical history, family history, social history reviewed and updated    Objective:     Vitals: /74 (BP Location: Right arm, Patient Position: Sitting, BP Cuff Size: Small adult)   Pulse 80   Temp 36.5 °C (97.7 °F) (Temporal)   Resp 12   Ht 1.524 m (5')   Wt 54.9 kg (121 lb)   SpO2 97%   BMI 23.63 kg/m²   General:  Alert, pleasant, NAD  Eyes:  normal inspection of conjunctivae and lids, EOMI,   ENMT:  External ears and nose are normal.    Neck  supple,   Heart:  Regular rate and rhythm,  No LE edema  Respiratory:  Normal respiratory effort, Clear to auscultation bilaterally.  Abdomen:   soft, Non-distended,   Skin:  Warm, dry, no rashes,   Musculoskeletal:  Normal gait, Normal digits and nails.  Neurological: No tremors,   Psych:   Affect/mood is normal, judgement is good, memory is intact, grooming is appropriate.    Assessment/Plan:     Delphine was seen today for follow-up.    Diagnoses and all orders for this visit:    Essential hypertension  Chronic medical diagnosis.  Blood pressures taken at home are well controlled.  We will continue to monitor.  Continue with current medications    Pure hypercholesterolemia  Chronic medical diagnosis.  Stable.  Continue with Lipitor    CKD (chronic kidney disease) stage 3, GFR 30-59 ml/min (HCC)  Chronic medical diagnosis.  Stable.  Continue to follow-up with nephrology, Dr. Hernandez in October.    Need for vaccination  -     Zoster Vac Recomb Adjuvanted (SHINGRIX) 50 MCG/0.5ML Recon Susp; 0.5 mL by Intramuscular route Once for 1 dose.    Other neutropenia (HCC)  Chronic medical diagnosis.  Labs were reviewed and discussed in detail with patient.  We will repeat these labs.  Follow-up with me in 4 months.  -     CBC WITH DIFFERENTIAL; Future  -     URINE PROTEIN; Future  -     SPEP W/REFLEX TO IRMA, A, G, M; Future          Return in about 4 months  (around 10/10/2020) for neutropenia.  This note was created using voice recognition software (Dragon). The accuracy of the dictation is limited by the abilities of the software. I have reviewed the note prior to signing, however some errors in grammar and context are still possible. If you have any questions related to this note please do not hesitate to contact our office.

## 2020-09-26 ENCOUNTER — HOSPITAL ENCOUNTER (OUTPATIENT)
Dept: LAB | Facility: MEDICAL CENTER | Age: 85
End: 2020-09-26
Attending: NURSE PRACTITIONER
Payer: COMMERCIAL

## 2020-09-26 LAB
25(OH)D3 SERPL-MCNC: 74 NG/ML (ref 30–100)
ALBUMIN SERPL BCP-MCNC: 4.2 G/DL (ref 3.2–4.9)
ALBUMIN/GLOB SERPL: 1.8 G/DL
ALP SERPL-CCNC: 109 U/L (ref 30–99)
ALT SERPL-CCNC: 12 U/L (ref 2–50)
ANION GAP SERPL CALC-SCNC: 12 MMOL/L (ref 7–16)
APPEARANCE UR: ABNORMAL
AST SERPL-CCNC: 15 U/L (ref 12–45)
BACTERIA #/AREA URNS HPF: ABNORMAL /HPF
BASOPHILS # BLD AUTO: 0.2 % (ref 0–1.8)
BASOPHILS # BLD: 0.01 K/UL (ref 0–0.12)
BILIRUB SERPL-MCNC: 0.4 MG/DL (ref 0.1–1.5)
BILIRUB UR QL STRIP.AUTO: NEGATIVE
BUN SERPL-MCNC: 20 MG/DL (ref 8–22)
CALCIUM SERPL-MCNC: 9.4 MG/DL (ref 8.5–10.5)
CHLORIDE SERPL-SCNC: 104 MMOL/L (ref 96–112)
CO2 SERPL-SCNC: 26 MMOL/L (ref 20–33)
COLOR UR: YELLOW
CREAT SERPL-MCNC: 1.41 MG/DL (ref 0.5–1.4)
CREAT UR-MCNC: 50.69 MG/DL
EOSINOPHIL # BLD AUTO: 0.01 K/UL (ref 0–0.51)
EOSINOPHIL NFR BLD: 0.2 % (ref 0–6.9)
EPI CELLS #/AREA URNS HPF: ABNORMAL /HPF
ERYTHROCYTE [DISTWIDTH] IN BLOOD BY AUTOMATED COUNT: 48.7 FL (ref 35.9–50)
GLOBULIN SER CALC-MCNC: 2.3 G/DL (ref 1.9–3.5)
GLUCOSE SERPL-MCNC: 100 MG/DL (ref 65–99)
GLUCOSE UR STRIP.AUTO-MCNC: NEGATIVE MG/DL
HCT VFR BLD AUTO: 38.3 % (ref 37–47)
HGB BLD-MCNC: 11.8 G/DL (ref 12–16)
HYALINE CASTS #/AREA URNS LPF: ABNORMAL /LPF
IMM GRANULOCYTES # BLD AUTO: 0.01 K/UL (ref 0–0.11)
IMM GRANULOCYTES NFR BLD AUTO: 0.2 % (ref 0–0.9)
KETONES UR STRIP.AUTO-MCNC: NEGATIVE MG/DL
LEUKOCYTE ESTERASE UR QL STRIP.AUTO: ABNORMAL
LYMPHOCYTES # BLD AUTO: 1.35 K/UL (ref 1–4.8)
LYMPHOCYTES NFR BLD: 33.6 % (ref 22–41)
MCH RBC QN AUTO: 30.3 PG (ref 27–33)
MCHC RBC AUTO-ENTMCNC: 30.8 G/DL (ref 33.6–35)
MCV RBC AUTO: 98.2 FL (ref 81.4–97.8)
MICRO URNS: ABNORMAL
MONOCYTES # BLD AUTO: 0.56 K/UL (ref 0–0.85)
MONOCYTES NFR BLD AUTO: 13.9 % (ref 0–13.4)
NEUTROPHILS # BLD AUTO: 2.08 K/UL (ref 2–7.15)
NEUTROPHILS NFR BLD: 51.9 % (ref 44–72)
NITRITE UR QL STRIP.AUTO: POSITIVE
NRBC # BLD AUTO: 0 K/UL
NRBC BLD-RTO: 0 /100 WBC
PH UR STRIP.AUTO: 7 [PH] (ref 5–8)
PLATELET # BLD AUTO: 155 K/UL (ref 164–446)
PMV BLD AUTO: 12.2 FL (ref 9–12.9)
POTASSIUM SERPL-SCNC: 5 MMOL/L (ref 3.6–5.5)
PROT SERPL-MCNC: 6.5 G/DL (ref 6–8.2)
PROT UR QL STRIP: NEGATIVE MG/DL
PROT UR-MCNC: 12 MG/DL (ref 0–15)
PROT/CREAT UR: 237 MG/G (ref 10–107)
PTH-INTACT SERPL-MCNC: 67.8 PG/ML (ref 14–72)
RBC # BLD AUTO: 3.9 M/UL (ref 4.2–5.4)
RBC # URNS HPF: ABNORMAL /HPF
RBC UR QL AUTO: NEGATIVE
SODIUM SERPL-SCNC: 142 MMOL/L (ref 135–145)
SP GR UR STRIP.AUTO: 1.01
UROBILINOGEN UR STRIP.AUTO-MCNC: 0.2 MG/DL
WBC # BLD AUTO: 4 K/UL (ref 4.8–10.8)
WBC #/AREA URNS HPF: ABNORMAL /HPF

## 2020-09-26 PROCEDURE — 81001 URINALYSIS AUTO W/SCOPE: CPT

## 2020-09-26 PROCEDURE — 36415 COLL VENOUS BLD VENIPUNCTURE: CPT

## 2020-09-26 PROCEDURE — 83970 ASSAY OF PARATHORMONE: CPT

## 2020-09-26 PROCEDURE — 87077 CULTURE AEROBIC IDENTIFY: CPT

## 2020-09-26 PROCEDURE — 87086 URINE CULTURE/COLONY COUNT: CPT

## 2020-09-26 PROCEDURE — 82306 VITAMIN D 25 HYDROXY: CPT

## 2020-09-26 PROCEDURE — 80053 COMPREHEN METABOLIC PANEL: CPT

## 2020-09-26 PROCEDURE — 85025 COMPLETE CBC W/AUTO DIFF WBC: CPT

## 2020-09-26 PROCEDURE — 87186 SC STD MICRODIL/AGAR DIL: CPT

## 2020-10-15 ENCOUNTER — HOSPITAL ENCOUNTER (OUTPATIENT)
Dept: LAB | Facility: MEDICAL CENTER | Age: 85
End: 2020-10-15
Attending: FAMILY MEDICINE
Payer: MEDICARE

## 2020-10-15 DIAGNOSIS — D70.8 OTHER NEUTROPENIA (HCC): ICD-10-CM

## 2020-10-15 LAB
BASOPHILS # BLD AUTO: 0.3 % (ref 0–1.8)
BASOPHILS # BLD: 0.01 K/UL (ref 0–0.12)
EOSINOPHIL # BLD AUTO: 0.01 K/UL (ref 0–0.51)
EOSINOPHIL NFR BLD: 0.3 % (ref 0–6.9)
ERYTHROCYTE [DISTWIDTH] IN BLOOD BY AUTOMATED COUNT: 49.7 FL (ref 35.9–50)
HCT VFR BLD AUTO: 38.3 % (ref 37–47)
HGB BLD-MCNC: 11.8 G/DL (ref 12–16)
IMM GRANULOCYTES # BLD AUTO: 0.01 K/UL (ref 0–0.11)
IMM GRANULOCYTES NFR BLD AUTO: 0.3 % (ref 0–0.9)
LYMPHOCYTES # BLD AUTO: 1.33 K/UL (ref 1–4.8)
LYMPHOCYTES NFR BLD: 37.6 % (ref 22–41)
MCH RBC QN AUTO: 29.6 PG (ref 27–33)
MCHC RBC AUTO-ENTMCNC: 30.8 G/DL (ref 33.6–35)
MCV RBC AUTO: 96.2 FL (ref 81.4–97.8)
MONOCYTES # BLD AUTO: 0.46 K/UL (ref 0–0.85)
MONOCYTES NFR BLD AUTO: 13 % (ref 0–13.4)
NEUTROPHILS # BLD AUTO: 1.72 K/UL (ref 2–7.15)
NEUTROPHILS NFR BLD: 48.5 % (ref 44–72)
NRBC # BLD AUTO: 0 K/UL
NRBC BLD-RTO: 0 /100 WBC
PLATELET # BLD AUTO: 162 K/UL (ref 164–446)
PMV BLD AUTO: 13 FL (ref 9–12.9)
PROT UR-MCNC: 23 MG/DL (ref 0–15)
RBC # BLD AUTO: 3.98 M/UL (ref 4.2–5.4)
WBC # BLD AUTO: 3.5 K/UL (ref 4.8–10.8)

## 2020-10-15 PROCEDURE — 84156 ASSAY OF PROTEIN URINE: CPT

## 2020-10-15 PROCEDURE — 85025 COMPLETE CBC W/AUTO DIFF WBC: CPT

## 2020-10-15 PROCEDURE — 84165 PROTEIN E-PHORESIS SERUM: CPT

## 2020-10-15 PROCEDURE — 36415 COLL VENOUS BLD VENIPUNCTURE: CPT

## 2020-10-15 PROCEDURE — 84155 ASSAY OF PROTEIN SERUM: CPT

## 2020-10-18 LAB
ALBUMIN SERPL ELPH-MCNC: 3.82 G/DL (ref 3.75–5.01)
ALPHA1 GLOB SERPL ELPH-MCNC: 0.33 G/DL (ref 0.19–0.46)
ALPHA2 GLOB SERPL ELPH-MCNC: 0.82 G/DL (ref 0.48–1.05)
B-GLOBULIN SERPL ELPH-MCNC: 0.67 G/DL (ref 0.48–1.1)
GAMMA GLOB SERPL ELPH-MCNC: 0.65 G/DL (ref 0.62–1.51)
INTERPRETATION SERPL IFE-IMP: NORMAL
MONOCLON BAND OBS SERPL: NORMAL
PATHOLOGY STUDY: NORMAL
PROT SERPL-MCNC: 6.3 G/DL (ref 6.3–8.2)

## 2020-10-20 ENCOUNTER — OFFICE VISIT (OUTPATIENT)
Dept: MEDICAL GROUP | Facility: PHYSICIAN GROUP | Age: 85
End: 2020-10-20
Payer: MEDICARE

## 2020-10-20 VITALS
OXYGEN SATURATION: 97 % | RESPIRATION RATE: 16 BRPM | DIASTOLIC BLOOD PRESSURE: 58 MMHG | HEART RATE: 70 BPM | SYSTOLIC BLOOD PRESSURE: 144 MMHG | HEIGHT: 58 IN | WEIGHT: 118 LBS | TEMPERATURE: 97.7 F | BODY MASS INDEX: 24.77 KG/M2

## 2020-10-20 DIAGNOSIS — I10 ESSENTIAL HYPERTENSION: ICD-10-CM

## 2020-10-20 DIAGNOSIS — D69.6 THROMBOCYTOPENIA (HCC): ICD-10-CM

## 2020-10-20 DIAGNOSIS — D70.8 OTHER NEUTROPENIA (HCC): ICD-10-CM

## 2020-10-20 DIAGNOSIS — N18.32 STAGE 3B CHRONIC KIDNEY DISEASE: ICD-10-CM

## 2020-10-20 DIAGNOSIS — Z23 NEED FOR VACCINATION: ICD-10-CM

## 2020-10-20 PROCEDURE — G0008 ADMIN INFLUENZA VIRUS VAC: HCPCS | Performed by: FAMILY MEDICINE

## 2020-10-20 PROCEDURE — 90662 IIV NO PRSV INCREASED AG IM: CPT | Performed by: FAMILY MEDICINE

## 2020-10-20 PROCEDURE — 99214 OFFICE O/P EST MOD 30 MIN: CPT | Mod: 25 | Performed by: FAMILY MEDICINE

## 2020-10-20 ASSESSMENT — FIBROSIS 4 INDEX: FIB4 SCORE: 2.41

## 2020-10-20 NOTE — PROGRESS NOTES
CC: Follow-up immunizations                                                                                                                                   Delphine presents today for follow-up and immunizations    CKD/ hypertension.   Chronic.  Continues to f/u Dr Hernandez every 3 months.  Recently had an appt with him two weeks ago. Last GFR  A month ago had improved to 35.  BP today is 144/58.  Takes amlodipine 5mg and losartan 50mg daily. Denies any headaches or chest pain.  She will start checking blood pressures at home.    Neutropenia/ thrombocytopenia  Chronic medical diagnosis.  Denies any frequent illnesses or infections.  Denies any nosebleeds or easy bruising.  Labs were reviewed with patient and she has been neutropenic dating back to 2016.  This is as far as her records go.  With recent labs done last week her WBC is 3.5 and decreased absolute neutrophils at 1.72.  Hemoglobin hematocrit are 11.8 and 38.3.  Platelet count has been decreased at 162,000.  She has never been evaluated by a hematologist and does not wish though at this point.  She would like us to just continue monitoring these labs.     Need for vaccination  will receive flu vaccine today.       Patient Active Problem List    Diagnosis Date Noted   • Thrombocytopenia ([facility]) 10/20/2020   • Other neutropenia ([facility]) 06/10/2020   • Low serum vitamin D 11/14/2017   • CKD (chronic kidney disease) stage 3, GFR 30-59 ml/min 09/22/2017   • Hyperparathyroidism ([facility]) 03/24/2017   • Hypercalcemia 02/21/2017   • Abnormal chest CT 02/21/2017   • Pure hypercholesterolemia 09/12/2016   • Essential hypertension 02/10/2016   • Gastroesophageal reflux disease without esophagitis 02/10/2016   • Mild intermittent asthma without complication 02/10/2016       Current Outpatient Medications   Medication Sig Dispense Refill   • B Complex-C (B COMPLEX-VITAMIN C PO) Take  by mouth.     • amLODIPine (NORVASC) 5 MG Tab Take 1 Tab by mouth every day. 90 Tab 3   •  "atorvastatin (LIPITOR) 20 MG Tab Take 1 Tab by mouth every day. 90 Tab 3   • famotidine (PEPCID) 40 MG Tab Take 1 Tab by mouth every day. 90 Tab 3   • losartan (COZAAR) 50 MG Tab Take 1 Tab by mouth every day. 90 Tab 3   • SENSIPAR 60 MG Tab Take 1 Tab by mouth every Monday, Wednesday, and Friday.  7   • Cholecalciferol (VITAMIN D-3) 5000 units Tab Take  by mouth every Monday, Wednesday, and Friday.       No current facility-administered medications for this visit.          Allergies as of 10/20/2020   • (No Known Allergies)          /58 (BP Location: Right arm, Patient Position: Sitting, BP Cuff Size: Small adult)   Pulse 70   Temp 36.5 °C (97.7 °F) (Temporal)   Resp 16   Ht 1.473 m (4' 10\")   Wt 53.5 kg (118 lb)   SpO2 97%   BMI 24.66 kg/m²     Physical Exam:  Gen:         Alert and oriented, No apparent distress.  Neck:        supple, No Lymphadenopathy  Lungs:     Clear to auscultation bilaterally  CV:          Regular rate and rhythm. no LE edema             Ext:          No clubbing, cyanosis      Assessment and Plan.   89 y.o. female with the following issues.    Delphine was seen today for follow-up and immunizations.    Diagnoses and all orders for this visit:    Essential hypertension  Chronic.  Not well controlled.  Encouraged to start checking blood pressures at home.  Continue with current dose of amlodipine 5 mg and losartan 50 mg daily  .    Stage 3b chronic kidney disease  Chronic medical diagnosis.  Stable.  Continue to follow-up with nephrology.  Next appointment with Dr. Hernandez is in December per patient  .  Need for vaccination  -     Influenza Vaccine, High Dose (65+ Only)    Other neutropenia (HCC)  Chronic.  Labs have been stable over the last 4 years.  Remains asymptomatic.  Declining any further work-up  .-     CBC WITH DIFFERENTIAL; Future    Thrombocytopenia (HCC)  Chronic medical diagnosis.  Stable.  Declining any further work-up or referral.  We will continue to monitor and " recheck labs in 3 months.  -     CBC WITH DIFFERENTIAL; Future        Follow up: 3 months

## 2020-11-10 DIAGNOSIS — E78.00 PURE HYPERCHOLESTEROLEMIA: ICD-10-CM

## 2020-11-10 DIAGNOSIS — K21.9 GASTROESOPHAGEAL REFLUX DISEASE WITHOUT ESOPHAGITIS: ICD-10-CM

## 2020-11-10 DIAGNOSIS — I10 ESSENTIAL HYPERTENSION: ICD-10-CM

## 2020-11-10 RX ORDER — ATORVASTATIN CALCIUM 20 MG/1
20 TABLET, FILM COATED ORAL DAILY
Qty: 90 TAB | Refills: 0 | Status: SHIPPED | OUTPATIENT
Start: 2020-11-10 | End: 2021-01-06

## 2020-11-10 RX ORDER — LOSARTAN POTASSIUM 50 MG/1
50 TABLET ORAL DAILY
Qty: 90 TAB | Refills: 0 | Status: SHIPPED | OUTPATIENT
Start: 2020-11-10 | End: 2021-01-06

## 2020-11-10 RX ORDER — FAMOTIDINE 40 MG/1
40 TABLET, FILM COATED ORAL DAILY
Qty: 90 TAB | Refills: 0 | Status: SHIPPED | OUTPATIENT
Start: 2020-11-10 | End: 2021-01-05

## 2020-11-10 RX ORDER — AMLODIPINE BESYLATE 5 MG/1
5 TABLET ORAL DAILY
Qty: 90 TAB | Refills: 0 | Status: SHIPPED | OUTPATIENT
Start: 2020-11-10 | End: 2021-01-06

## 2020-11-10 NOTE — TELEPHONE ENCOUNTER
Received request via: Pharmacy    Was the patient seen in the last year in this department? Yes    Does the patient have an active prescription (recently filled or refills available) for medication(s) requested? Changing Mail order service.

## 2020-11-10 NOTE — TELEPHONE ENCOUNTER
Requested Prescriptions     Pending Prescriptions Disp Refills   • amLODIPine (NORVASC) 5 MG Tab 90 Tab 0     Sig: Take 1 Tab by mouth every day.   • atorvastatin (LIPITOR) 20 MG Tab 90 Tab 0     Sig: Take 1 Tab by mouth every day.   • famotidine (PEPCID) 40 MG Tab 90 Tab 0     Sig: Take 1 Tab by mouth every day.   • losartan (COZAAR) 50 MG Tab 90 Tab 0     Sig: Take 1 Tab by mouth every day.   Lorie Kumar M.D.

## 2021-01-04 DIAGNOSIS — K21.9 GASTROESOPHAGEAL REFLUX DISEASE WITHOUT ESOPHAGITIS: ICD-10-CM

## 2021-01-05 DIAGNOSIS — I10 ESSENTIAL HYPERTENSION: ICD-10-CM

## 2021-01-05 DIAGNOSIS — E78.00 PURE HYPERCHOLESTEROLEMIA: ICD-10-CM

## 2021-01-05 RX ORDER — FAMOTIDINE 40 MG/1
TABLET, FILM COATED ORAL
Qty: 90 TAB | Refills: 0 | Status: SHIPPED | OUTPATIENT
Start: 2021-01-05 | End: 2021-01-25 | Stop reason: SDUPTHER

## 2021-01-05 NOTE — TELEPHONE ENCOUNTER
Received request via: Pharmacy    Was the patient seen in the last year in this department? Yes on 10/20/2020    Does the patient have an active prescription (recently filled or refills available) for medication(s) requested? No

## 2021-01-05 NOTE — TELEPHONE ENCOUNTER
Requested Prescriptions     Pending Prescriptions Disp Refills   • famotidine (PEPCID) 40 MG Tab [Pharmacy Med Name: FAMOTIDINE  40MG  TAB] 90 Tab 0     Sig: TAKE 1 TABLET BY MOUTH  DAILY   Lorie Kumar M.D.

## 2021-01-06 RX ORDER — LOSARTAN POTASSIUM 50 MG/1
TABLET ORAL
Qty: 90 TAB | Refills: 0 | Status: SHIPPED | OUTPATIENT
Start: 2021-01-06 | End: 2021-01-25 | Stop reason: SDUPTHER

## 2021-01-06 RX ORDER — AMLODIPINE BESYLATE 5 MG/1
TABLET ORAL
Qty: 90 TAB | Refills: 0 | Status: SHIPPED | OUTPATIENT
Start: 2021-01-06 | End: 2021-01-25 | Stop reason: SDUPTHER

## 2021-01-06 RX ORDER — ATORVASTATIN CALCIUM 20 MG/1
TABLET, FILM COATED ORAL
Qty: 90 TAB | Refills: 0 | Status: SHIPPED | OUTPATIENT
Start: 2021-01-06 | End: 2021-01-25 | Stop reason: SDUPTHER

## 2021-01-14 DIAGNOSIS — Z23 NEED FOR VACCINATION: ICD-10-CM

## 2021-01-25 ENCOUNTER — OFFICE VISIT (OUTPATIENT)
Dept: MEDICAL GROUP | Facility: PHYSICIAN GROUP | Age: 86
End: 2021-01-25
Payer: MEDICARE

## 2021-01-25 VITALS
DIASTOLIC BLOOD PRESSURE: 66 MMHG | OXYGEN SATURATION: 98 % | RESPIRATION RATE: 16 BRPM | SYSTOLIC BLOOD PRESSURE: 130 MMHG | WEIGHT: 118 LBS | TEMPERATURE: 97.9 F | HEIGHT: 58 IN | HEART RATE: 72 BPM | BODY MASS INDEX: 24.77 KG/M2

## 2021-01-25 DIAGNOSIS — K21.9 GASTROESOPHAGEAL REFLUX DISEASE WITHOUT ESOPHAGITIS: ICD-10-CM

## 2021-01-25 DIAGNOSIS — Z76.89 ENCOUNTER TO ESTABLISH CARE: ICD-10-CM

## 2021-01-25 DIAGNOSIS — D69.6 THROMBOCYTOPENIA (HCC): ICD-10-CM

## 2021-01-25 DIAGNOSIS — E78.00 PURE HYPERCHOLESTEROLEMIA: ICD-10-CM

## 2021-01-25 DIAGNOSIS — E21.3 HYPERPARATHYROIDISM (HCC): ICD-10-CM

## 2021-01-25 DIAGNOSIS — R79.89 LOW SERUM VITAMIN D: ICD-10-CM

## 2021-01-25 DIAGNOSIS — Z23 NEED FOR VACCINATION: ICD-10-CM

## 2021-01-25 DIAGNOSIS — N18.32 STAGE 3B CHRONIC KIDNEY DISEASE: ICD-10-CM

## 2021-01-25 DIAGNOSIS — I10 ESSENTIAL HYPERTENSION: ICD-10-CM

## 2021-01-25 PROCEDURE — 99214 OFFICE O/P EST MOD 30 MIN: CPT | Mod: 25 | Performed by: NURSE PRACTITIONER

## 2021-01-25 RX ORDER — ATORVASTATIN CALCIUM 20 MG/1
TABLET, FILM COATED ORAL
Qty: 90 TAB | Refills: 3 | Status: SHIPPED | OUTPATIENT
Start: 2021-01-25 | End: 2021-10-28

## 2021-01-25 RX ORDER — LOSARTAN POTASSIUM 50 MG/1
TABLET ORAL
Qty: 90 TAB | Refills: 3 | Status: SHIPPED | OUTPATIENT
Start: 2021-01-25 | End: 2021-10-28

## 2021-01-25 RX ORDER — AMLODIPINE BESYLATE 5 MG/1
TABLET ORAL
Qty: 90 TAB | Refills: 3 | Status: SHIPPED | OUTPATIENT
Start: 2021-01-25 | End: 2021-10-28 | Stop reason: SDUPTHER

## 2021-01-25 RX ORDER — FAMOTIDINE 40 MG/1
TABLET, FILM COATED ORAL
Qty: 90 TAB | Refills: 3 | Status: SHIPPED | OUTPATIENT
Start: 2021-01-25 | End: 2021-10-28

## 2021-01-25 RX ORDER — ZOSTER VACCINE RECOMBINANT, ADJUVANTED 50 MCG/0.5
0.5 KIT INTRAMUSCULAR ONCE
Qty: 0.5 ML | Refills: 0 | Status: SHIPPED
Start: 2021-01-25 | End: 2021-01-25

## 2021-01-25 ASSESSMENT — PATIENT HEALTH QUESTIONNAIRE - PHQ9: CLINICAL INTERPRETATION OF PHQ2 SCORE: 0

## 2021-01-25 ASSESSMENT — FIBROSIS 4 INDEX: FIB4 SCORE: 2.41

## 2021-01-25 NOTE — ASSESSMENT & PLAN NOTE
Chronic medical problem. She denies any bloody stools, hematuria, or blood nose. She is due for labs. Last lab results:  Results for NEHEMIAH RAMSEY (MRN [id number]) as of 1/25/2021 11:42   Ref. Range 10/15/2020 08:40   Platelet Count Latest Ref Range: 164 - 446 K/uL 162 (L)

## 2021-01-25 NOTE — ASSESSMENT & PLAN NOTE
Chronic medical problem. She recently had to place her  with Alzheimer's in a facility. She is taking amlodipine 5 mg daily and losartan 50 mg daily. She would like a medication refill. Her initial BP today is 142/72.  She is checking her blood pressure at home and states she has been getting readings 145-154/70's. Denies chest pain, shortness of breath, palpations, blurry vision or headaches. She does have intermittent dizziness with positional changes.

## 2021-01-25 NOTE — ASSESSMENT & PLAN NOTE
Chronic medical problem. She is taking vitamin D 5,000 units every Monday, Wednesday and Friday. Last lab results:  Results for NEHEMIAH RAMSEY (MRN [id number]) as of 1/25/2021 11:42   Ref. Range 9/26/2020 08:11   25-Hydroxy   Vitamin D 25 Latest Ref Range: 30 - 100 ng/mL 74

## 2021-01-25 NOTE — ASSESSMENT & PLAN NOTE
Chronic medical problem. She is followed by Dr. Hernandez with nephrology every 3 months. She is taking vitamin D every Monday, Wednesday and Friday. She is taking sensipar every Monday, Wednesday and Friday. Last calcium:  Results for NEHEMIAH RAMSEY (MRN [id number]) as of 1/25/2021 11:42   Ref. Range 9/26/2020 08:11   Calcium Latest Ref Range: 8.5 - 10.5 mg/dL 9.4

## 2021-01-25 NOTE — ASSESSMENT & PLAN NOTE
Chronic medical problem. She is taking atorvastatin 20 mg every day. She is tolerating the medication. Denies myalgias. She would like a medication refill.

## 2021-01-25 NOTE — ASSESSMENT & PLAN NOTE
Chronic medical problem. She is followed by Dr. Hernandez with nephrology every 3 months. She is not taking any NSAID's. She is on amlodipine and losartan.

## 2021-01-25 NOTE — PROGRESS NOTES
Subjective:     CC:  Diagnoses of Encounter to establish care, Essential hypertension, Pure hypercholesterolemia, Stage 3b chronic kidney disease, Thrombocytopenia (HCC), Hyperparathyroidism ([facility]), Gastroesophageal reflux disease without esophagitis, Low serum vitamin D, and Need for vaccination were pertinent to this visit.    HISTORY OF THE PRESENT ILLNESS: Patient is a 89 y.o. female. This pleasant patient is here today to establish care and discuss the following. Her prior PCP was Dr. Lorie Kumar.    Essential hypertension  Chronic medical problem. She recently had to place her  with Alzheimer's in a facility. She is taking amlodipine 5 mg daily and losartan 50 mg daily. She would like a medication refill. Her initial BP today is 142/72.  She is checking her blood pressure at home and states she has been getting readings 145-154/70's. Denies chest pain, shortness of breath, palpations, blurry vision or headaches. She does have intermittent dizziness with positional changes.     Pure hypercholesterolemia  Chronic medical problem. She is taking atorvastatin 20 mg every day. She is tolerating the medication. Denies myalgias. She would like a medication refill.     Gastroesophageal reflux disease without esophagitis  Chronic medical problem. She is taking famotidine 40 mg daily. She is tolerating the medication. She would like a medication refill today. Denies heart burn or indigestion.     Hyperparathyroidism (CMS-[facility])  Chronic medical problem. She is followed by Dr. Hernandez with nephrology every 3 months. She is taking vitamin D every Monday, Wednesday and Friday. She is taking sensipar every Monday, Wednesday and Friday. Last calcium:  Results for NEHEMIAH RAMSEY (MRN [id number]) as of 1/25/2021 11:42   Ref. Range 9/26/2020 08:11   Calcium Latest Ref Range: 8.5 - 10.5 mg/dL 9.4       CKD (chronic kidney disease) stage 3, GFR 30-59 ml/min  Chronic medical problem. She is followed by Dr. Hernandez with  nephrology every 3 months. She is not taking any NSAID's. She is on amlodipine and losartan.     Low serum vitamin D  Chronic medical problem. She is taking vitamin D 5,000 units every Monday, Wednesday and Friday. Last lab results:  Results for NEHEMIAH RAMSEY (MRN [id number]) as of 1/25/2021 11:42   Ref. Range 9/26/2020 08:11   25-Hydroxy   Vitamin D 25 Latest Ref Range: 30 - 100 ng/mL 74       Thrombocytopenia (HCC)  Chronic medical problem. She denies any bloody stools, hematuria, or blood nose. She is due for labs. Last lab results:  Results for NEHEMIAH RAMSEY (MRN [id number]) as of 1/25/2021 11:42   Ref. Range 10/15/2020 08:40   Platelet Count Latest Ref Range: 164 - 446 K/uL 162 (L)       Allergies: Patient has no known allergies.    Current Outpatient Medications Ordered in Epic   Medication Sig Dispense Refill   • amLODIPine (NORVASC) 5 MG Tab TAKE 1 TABLET BY MOUTH  DAILY 90 Tab 3   • losartan (COZAAR) 50 MG Tab TAKE 1 TABLET BY MOUTH  DAILY 90 Tab 3   • atorvastatin (LIPITOR) 20 MG Tab TAKE 1 TABLET BY MOUTH  DAILY 90 Tab 3   • famotidine (PEPCID) 40 MG Tab TAKE 1 TABLET BY MOUTH  DAILY 90 Tab 3   • Zoster Vac Recomb Adjuvanted (SHINGRIX) 50 MCG/0.5ML Recon Susp Inject 0.5 mL into the shoulder, thigh, or buttocks one time for 1 dose. 0.5 mL 0   • B Complex-C (B COMPLEX-VITAMIN C PO) Take  by mouth.     • SENSIPAR 60 MG Tab Take 1 Tab by mouth every Monday, Wednesday, and Friday.  7   • Cholecalciferol (VITAMIN D-3) 5000 units Tab Take  by mouth every Monday, Wednesday, and Friday.       No current Epic-ordered facility-administered medications on file.        Past Medical History:   Diagnosis Date   • GERD (gastroesophageal reflux disease)    • Hyperparathyroidism (HCC)    • Hypertension        Past Surgical History:   Procedure Laterality Date   • KNEE REPLACEMENT, TOTAL Left        Social History     Tobacco Use   • Smoking status: Never Smoker   • Smokeless tobacco: Never Used   Substance Use Topics   •  "Alcohol use: No     Comment: holidays   • Drug use: No       Social History     Social History Narrative   • Not on file       Family History   Problem Relation Age of Onset   • Heart Disease Mother    • Heart Disease Father    • Lung Disease Sister    • Heart Disease Brother    • No Known Problems Brother    • Cancer Neg Hx    • Diabetes Neg Hx        Health Maintenance: Due for AWV, COVID vaccine and zoster vaccine.     ROS:   Gen: no fevers/chills, no changes in weight  Eyes: no blurry vision  ENT: no sore throat, no ear pain  Pulm: no sob, no cough  CV: no chest pain, no palpitations  GI: no nausea/vomiting  : no dysuria  MSk: no myalgias  Skin: no rash  Neuro: no headaches, + intermittent positional dizziness      Objective:     Vital signs reviewed   Exam: /66   Pulse 72   Temp 36.6 °C (97.9 °F) (Temporal)   Resp 16   Ht 1.473 m (4' 10\")   Wt 53.5 kg (118 lb)   SpO2 98%  Body mass index is 24.66 kg/m².    General: Normal appearing. No distress. Dressed appropriately for the weather.   HENT: Normocephalic. Ears normal shape and contour, canals are clear bilaterally, tympanic membranes are benign.   Eyes: Eyes conjunctiva clear lids without ptosis, pupils equal and reactive to light accommodation, lids normal.  Neck: Supple without JVD.  Pulmonary: Clear to ausculation.  Normal effort. No rales, ronchi, or wheezing.  Cardiovascular: Regular rate and rhythm without murmur. Radial pulses are intact and equal bilaterally.  Abdomen: Soft, nontender, nondistended.   Neurologic: Grossly nonfocal  Skin: Warm and dry.  No obvious lesions.  Musculoskeletal: Normal gait. No extremity cyanosis, clubbing, or edema.  Psych: Normal mood and affect. Alert and oriented x3. Judgment and insight is normal.      Assessment & Plan:   89 y.o. female with the following -    1. Encounter to establish care  New problem to examiner. Care established.     2. Essential hypertension  New problem to examiner. On repeat by my " her BP was 130/66. Continue amlodipine and losartan, medication refilled. Due for updated labs. Red flags discussed. Monitor and follow.   - CBC WITH DIFFERENTIAL; Future  - amLODIPine (NORVASC) 5 MG Tab; TAKE 1 TABLET BY MOUTH  DAILY  Dispense: 90 Tab; Refill: 3  - losartan (COZAAR) 50 MG Tab; TAKE 1 TABLET BY MOUTH  DAILY  Dispense: 90 Tab; Refill: 3    3. Pure hypercholesterolemia  New problem to examiner. Continue atorvastatin, medication refilled. Due for updated labs. Monitor and follow.   - Lipid Profile; Future  - Comp Metabolic Panel; Future  - atorvastatin (LIPITOR) 20 MG Tab; TAKE 1 TABLET BY MOUTH  DAILY  Dispense: 90 Tab; Refill: 3    4. Stage 3b chronic kidney disease  New problem to examiner. BP is at goal today. Continue amlodipine and losartan. Avoid NSAID's. Due for updated labs. Monitor and follow.   - Comp Metabolic Panel; Future    5. Thrombocytopenia (HCC)  New problem to examiner. Due for updated labs. Monitor and follow.     6. Hyperparathyroidism (HCC)  New problem to examiner. Continue vitamin d and sensipar. Continue follow-up with nephrology. Monitor.     7. Gastroesophageal reflux disease without esophagitis  New problem to examiner. Continue famotidine, medication refilled. Continue to avoid diet triggers. Monitor and follow.   - famotidine (PEPCID) 40 MG Tab; TAKE 1 TABLET BY MOUTH  DAILY  Dispense: 90 Tab; Refill: 3    8. Low serum vitamin D  New problem to examiner. Continue vitamin d supplementation. Due for updated labs. Monitor and follow.   - VITAMIN D,25 HYDROXY; Future    9. Need for vaccination  New problem to examiner. Vaccine indicated, patient in agreement. Hand prescription provided to patient. Monitor.   - Zoster Vac Recomb Adjuvanted (SHINGRIX) 50 MCG/0.5ML Recon Susp; Inject 0.5 mL into the shoulder, thigh, or buttocks one time for 1 dose.  Dispense: 0.5 mL; Refill: 0      Return in about 3 months (around 4/25/2021).    Please note that this dictation was created using  voice recognition software. I have made every reasonable attempt to correct obvious errors, but I expect that there are errors of grammar and possibly content that I did not discover before finalizing the note.

## 2021-01-25 NOTE — ASSESSMENT & PLAN NOTE
Chronic medical problem. She is taking famotidine 40 mg daily. She is tolerating the medication. She would like a medication refill today. Denies heart burn or indigestion.

## 2021-04-12 ENCOUNTER — HOSPITAL ENCOUNTER (OUTPATIENT)
Dept: LAB | Facility: MEDICAL CENTER | Age: 86
End: 2021-04-12
Attending: NURSE PRACTITIONER
Payer: MEDICARE

## 2021-04-12 DIAGNOSIS — E78.00 PURE HYPERCHOLESTEROLEMIA: ICD-10-CM

## 2021-04-12 DIAGNOSIS — R79.89 LOW SERUM VITAMIN D: ICD-10-CM

## 2021-04-12 DIAGNOSIS — N18.32 STAGE 3B CHRONIC KIDNEY DISEASE: ICD-10-CM

## 2021-04-12 DIAGNOSIS — I10 ESSENTIAL HYPERTENSION: ICD-10-CM

## 2021-04-12 LAB
ALBUMIN SERPL BCP-MCNC: 4.3 G/DL (ref 3.2–4.9)
ALBUMIN/GLOB SERPL: 1.9 G/DL
ALP SERPL-CCNC: 70 U/L (ref 30–99)
ALT SERPL-CCNC: 19 U/L (ref 2–50)
ANION GAP SERPL CALC-SCNC: 9 MMOL/L (ref 7–16)
APPEARANCE UR: ABNORMAL
AST SERPL-CCNC: 21 U/L (ref 12–45)
BACTERIA #/AREA URNS HPF: ABNORMAL /HPF
BASOPHILS # BLD AUTO: 0 % (ref 0–1.8)
BASOPHILS # BLD: 0 K/UL (ref 0–0.12)
BILIRUB SERPL-MCNC: 0.4 MG/DL (ref 0.1–1.5)
BILIRUB UR QL STRIP.AUTO: NEGATIVE
BUN SERPL-MCNC: 23 MG/DL (ref 8–22)
CALCIUM SERPL-MCNC: 9.2 MG/DL (ref 8.5–10.5)
CALCIUM SERPL-MCNC: 9.3 MG/DL (ref 8.5–10.5)
CHLORIDE SERPL-SCNC: 101 MMOL/L (ref 96–112)
CHOLEST SERPL-MCNC: 169 MG/DL (ref 100–199)
CO2 SERPL-SCNC: 27 MMOL/L (ref 20–33)
COLOR UR: ABNORMAL
CREAT SERPL-MCNC: 1.31 MG/DL (ref 0.5–1.4)
CREAT UR-MCNC: 98.1 MG/DL
EOSINOPHIL # BLD AUTO: 0.01 K/UL (ref 0–0.51)
EOSINOPHIL NFR BLD: 0.2 % (ref 0–6.9)
EPI CELLS #/AREA URNS HPF: ABNORMAL /HPF
ERYTHROCYTE [DISTWIDTH] IN BLOOD BY AUTOMATED COUNT: 51 FL (ref 35.9–50)
FASTING STATUS PATIENT QL REPORTED: NORMAL
FERRITIN SERPL-MCNC: 38.3 NG/ML (ref 10–291)
GLOBULIN SER CALC-MCNC: 2.3 G/DL (ref 1.9–3.5)
GLUCOSE SERPL-MCNC: 104 MG/DL (ref 65–99)
GLUCOSE UR STRIP.AUTO-MCNC: NEGATIVE MG/DL
HCT VFR BLD AUTO: 39.7 % (ref 37–47)
HDLC SERPL-MCNC: 52 MG/DL
HGB BLD-MCNC: 12.4 G/DL (ref 12–16)
HYALINE CASTS #/AREA URNS LPF: ABNORMAL /LPF
IMM GRANULOCYTES # BLD AUTO: 0.02 K/UL (ref 0–0.11)
IMM GRANULOCYTES NFR BLD AUTO: 0.5 % (ref 0–0.9)
IRON SATN MFR SERPL: 34 % (ref 15–55)
IRON SERPL-MCNC: 94 UG/DL (ref 40–170)
KETONES UR STRIP.AUTO-MCNC: NEGATIVE MG/DL
LDLC SERPL CALC-MCNC: 90 MG/DL
LEUKOCYTE ESTERASE UR QL STRIP.AUTO: ABNORMAL
LYMPHOCYTES # BLD AUTO: 1.23 K/UL (ref 1–4.8)
LYMPHOCYTES NFR BLD: 28.8 % (ref 22–41)
MCH RBC QN AUTO: 31 PG (ref 27–33)
MCHC RBC AUTO-ENTMCNC: 31.2 G/DL (ref 33.6–35)
MCV RBC AUTO: 99.3 FL (ref 81.4–97.8)
MICRO URNS: ABNORMAL
MONOCYTES # BLD AUTO: 0.53 K/UL (ref 0–0.85)
MONOCYTES NFR BLD AUTO: 12.4 % (ref 0–13.4)
NEUTROPHILS # BLD AUTO: 2.48 K/UL (ref 2–7.15)
NEUTROPHILS NFR BLD: 58.1 % (ref 44–72)
NITRITE UR QL STRIP.AUTO: POSITIVE
NRBC # BLD AUTO: 0 K/UL
NRBC BLD-RTO: 0 /100 WBC
PH UR STRIP.AUTO: 6.5 [PH] (ref 5–8)
PLATELET # BLD AUTO: 159 K/UL (ref 164–446)
PMV BLD AUTO: 11.7 FL (ref 9–12.9)
POTASSIUM SERPL-SCNC: 4.3 MMOL/L (ref 3.6–5.5)
PROT SERPL-MCNC: 6.6 G/DL (ref 6–8.2)
PROT UR QL STRIP: 30 MG/DL
PROT UR-MCNC: 27 MG/DL (ref 0–15)
PROT/CREAT UR: 275 MG/G (ref 10–107)
PTH-INTACT SERPL-MCNC: 74.7 PG/ML (ref 14–72)
RBC # BLD AUTO: 4 M/UL (ref 4.2–5.4)
RBC # URNS HPF: ABNORMAL /HPF
RBC UR QL AUTO: NEGATIVE
SODIUM SERPL-SCNC: 137 MMOL/L (ref 135–145)
SP GR UR STRIP.AUTO: 1.01
TIBC SERPL-MCNC: 277 UG/DL (ref 250–450)
TRIGL SERPL-MCNC: 133 MG/DL (ref 0–149)
UIBC SERPL-MCNC: 183 UG/DL (ref 110–370)
UROBILINOGEN UR STRIP.AUTO-MCNC: 0.2 MG/DL
WBC # BLD AUTO: 4.3 K/UL (ref 4.8–10.8)
WBC #/AREA URNS HPF: ABNORMAL /HPF

## 2021-04-12 PROCEDURE — 87186 SC STD MICRODIL/AGAR DIL: CPT

## 2021-04-12 PROCEDURE — 81001 URINALYSIS AUTO W/SCOPE: CPT

## 2021-04-12 PROCEDURE — 82728 ASSAY OF FERRITIN: CPT

## 2021-04-12 PROCEDURE — 83970 ASSAY OF PARATHORMONE: CPT

## 2021-04-12 PROCEDURE — 82570 ASSAY OF URINE CREATININE: CPT

## 2021-04-12 PROCEDURE — 82306 VITAMIN D 25 HYDROXY: CPT

## 2021-04-12 PROCEDURE — 83550 IRON BINDING TEST: CPT

## 2021-04-12 PROCEDURE — 85025 COMPLETE CBC W/AUTO DIFF WBC: CPT

## 2021-04-12 PROCEDURE — 80053 COMPREHEN METABOLIC PANEL: CPT

## 2021-04-12 PROCEDURE — 84156 ASSAY OF PROTEIN URINE: CPT

## 2021-04-12 PROCEDURE — 80061 LIPID PANEL: CPT

## 2021-04-12 PROCEDURE — 87077 CULTURE AEROBIC IDENTIFY: CPT | Mod: 91

## 2021-04-12 PROCEDURE — 87086 URINE CULTURE/COLONY COUNT: CPT

## 2021-04-12 PROCEDURE — 83540 ASSAY OF IRON: CPT

## 2021-04-12 PROCEDURE — 36415 COLL VENOUS BLD VENIPUNCTURE: CPT

## 2021-04-13 LAB — 25(OH)D3 SERPL-MCNC: 45 NG/ML (ref 30–80)

## 2021-04-14 DIAGNOSIS — N30.00 ACUTE CYSTITIS WITHOUT HEMATURIA: ICD-10-CM

## 2021-04-14 RX ORDER — SULFAMETHOXAZOLE AND TRIMETHOPRIM 800; 160 MG/1; MG/1
1 TABLET ORAL 2 TIMES DAILY
Qty: 6 TABLET | Refills: 0 | Status: SHIPPED | OUTPATIENT
Start: 2021-04-14 | End: 2021-04-17

## 2021-04-28 ENCOUNTER — OFFICE VISIT (OUTPATIENT)
Dept: MEDICAL GROUP | Facility: PHYSICIAN GROUP | Age: 86
End: 2021-04-28
Payer: MEDICARE

## 2021-04-28 VITALS
HEART RATE: 73 BPM | OXYGEN SATURATION: 98 % | RESPIRATION RATE: 14 BRPM | DIASTOLIC BLOOD PRESSURE: 60 MMHG | SYSTOLIC BLOOD PRESSURE: 160 MMHG | HEIGHT: 59 IN | TEMPERATURE: 97.1 F | WEIGHT: 120 LBS | BODY MASS INDEX: 24.19 KG/M2

## 2021-04-28 DIAGNOSIS — N18.32 STAGE 3B CHRONIC KIDNEY DISEASE: ICD-10-CM

## 2021-04-28 DIAGNOSIS — I10 ESSENTIAL HYPERTENSION: ICD-10-CM

## 2021-04-28 DIAGNOSIS — G47.9 DIFFICULTY SLEEPING: ICD-10-CM

## 2021-04-28 DIAGNOSIS — N30.00 ACUTE CYSTITIS WITHOUT HEMATURIA: ICD-10-CM

## 2021-04-28 PROCEDURE — 99214 OFFICE O/P EST MOD 30 MIN: CPT | Performed by: NURSE PRACTITIONER

## 2021-04-28 RX ORDER — SULFAMETHOXAZOLE AND TRIMETHOPRIM 800; 160 MG/1; MG/1
1 TABLET ORAL 2 TIMES DAILY
Qty: 6 TABLET | Refills: 0 | Status: SHIPPED | OUTPATIENT
Start: 2021-04-28 | End: 2021-05-01

## 2021-04-28 ASSESSMENT — FIBROSIS 4 INDEX: FIB4 SCORE: 2.73

## 2021-04-28 NOTE — ASSESSMENT & PLAN NOTE
Chronic medical problem.  She had recent labs completed.  She is followed by Dr. Hernandez with nephrology has upcoming appointment tomorrow.  She is on losartan and amlodipine.  Her blood pressure is elevated today, but patient states she is not taking her blood pressure medications today.  Last lab results:  Results for NEHEMIAH RAMSEY (MRN [id number]) as of 4/28/2021 10:23   Ref. Range 4/12/2021 08:49   GFR If  Latest Ref Range: >60 mL/min/1.73 m 2 46 (A)   GFR If Non  Latest Ref Range: >60 mL/min/1.73 m 2 38 (A)

## 2021-04-28 NOTE — PROGRESS NOTES
Subjective:     CC: Hypertension    HPI:   Nehemiah presents today with the following:    Essential hypertension  Chronic medical problem. Her initial blood pressure today is 160/80. She states that she did not take her blood pressure medication this morning. She states that she normally takes her blood pressure medications in the morning.  She has prescription for amlodipine 5 mg daily and losartan 50 mg daily.  She checks her blood pressure once a week. She states the top number can run in the 140's. She has appointment tomorrow with Dr. Hernandez her nephrologist.  She denies any chest pain, shortness of breath, dizziness, blurry vision, or headache.    Difficulty sleeping  Acute medical problem. The problem started 3 months ago. Her  passed away in January.  She is not practicing sleep hygiene.  She does have good family support from her son, daughter-in-law, and her neighbors who check on her frequently.    CKD (chronic kidney disease) stage 3, GFR 30-59 ml/min  Chronic medical problem.  She had recent labs completed.  She is followed by Dr. Hernandez with nephrology has upcoming appointment tomorrow.  She is on losartan and amlodipine.  Her blood pressure is elevated today, but patient states she is not taking her blood pressure medications today.  Last lab results:  Results for NEHEMIAH RAMSEY (MRN [id number]) as of 4/28/2021 10:23   Ref. Range 4/12/2021 08:49   GFR If  Latest Ref Range: >60 mL/min/1.73 m 2 46 (A)   GFR If Non  Latest Ref Range: >60 mL/min/1.73 m 2 38 (A)       Acute cystitis without hematuria  Acute medical problem.  She had urinalysis completed on 4/12/2021 that did grow E. coli.  I had sent over prescription to her pharmacy but she has not picked up.  She does not had any fever, chills, nausea, vomiting, hematuria, chest pain, shortness of breath, or confusion.      Past Medical History:   Diagnosis Date   • GERD (gastroesophageal reflux disease)    •  "Hyperparathyroidism (HCC)    • Hypertension        Social History     Tobacco Use   • Smoking status: Never Smoker   • Smokeless tobacco: Never Used   Substance Use Topics   • Alcohol use: No     Comment: holidays   • Drug use: No       Current Outpatient Medications Ordered in Epic   Medication Sig Dispense Refill   • sulfamethoxazole-trimethoprim (BACTRIM DS) 800-160 MG tablet Take 1 tablet by mouth 2 times a day for 3 days. 6 tablet 0   • amLODIPine (NORVASC) 5 MG Tab TAKE 1 TABLET BY MOUTH  DAILY 90 Tab 3   • losartan (COZAAR) 50 MG Tab TAKE 1 TABLET BY MOUTH  DAILY 90 Tab 3   • atorvastatin (LIPITOR) 20 MG Tab TAKE 1 TABLET BY MOUTH  DAILY 90 Tab 3   • famotidine (PEPCID) 40 MG Tab TAKE 1 TABLET BY MOUTH  DAILY 90 Tab 3   • B Complex-C (B COMPLEX-VITAMIN C PO) Take  by mouth.     • SENSIPAR 60 MG Tab Take 1 Tab by mouth every Monday, Wednesday, and Friday.  7   • Cholecalciferol (VITAMIN D-3) 5000 units Tab Take  by mouth every Monday, Wednesday, and Friday.       No current Epic-ordered facility-administered medications on file.       Allergies:  Patient has no known allergies.    Health Maintenance: Due for annual wellness visit, zoster vaccine, and Covid vaccine    ROS:  Gen: no fevers/chills, no changes in weight, no confusion, + difficulty sleeping  Eyes: no changes in vision  Pulm: no shortness of breath  CV: no chest pain, no palpitations  GI: no nausea/vomiting, no diarrhea  : no dysuria, no hematuria, no flank pain  MSk: no myalgias  Skin: no rash  Neuro: no headaches, no dizziness    Objective:     Vital signs reviewed  Exam:  /60   Pulse 73   Temp 36.2 °C (97.1 °F) (Temporal)   Resp 14   Ht 1.486 m (4' 10.5\")   Wt 54.4 kg (120 lb)   SpO2 98%   BMI 24.65 kg/m²  Body mass index is 24.65 kg/m².    Gen: Alert and oriented, No apparent distress.  Neck: Neck is supple without lymphadenopathy.  Lungs: Normal effort, CTA bilaterally, no wheezes, rhonchi, or rales  CV: Regular rate and rhythm. " No murmurs, rubs, or gallops.  Ext: No clubbing, cyanosis, edema.      Assessment & Plan:     89 y.o. female with the following -     1. Essential hypertension  Chronic exacerbated problem.  On repeat by me her blood pressure is 160/60.  She did not take her blood pressure medication today.  I discussed with patient to go home and take her blood pressure medication, she verbalized understanding.  She does have appointment tomorrow with her nephrologist.  She will continue with home blood pressure monitoring.  She will return in 1 week for BP check with MA.  Discussed with patient if her blood pressure remains greater than 140/90 we will need to increase her medication, she verbalized understanding.  Red flags discussed.    2. Difficulty sleeping  Acute uncomplicated problem.  She does not wish to start medication at this time.  We discussed sleep hygiene.  Discussed she may try over-the-counter sleepy time tea and melatonin.  She states that she will start with a sleepy time tea first.  She will return in 1 month for follow-up.    3. Acute cystitis without hematuria  Acute uncomplicated problem.  I resent over her antibiotic prescription.  She states that she will go  her prescription today.  Red flags discussed.  - sulfamethoxazole-trimethoprim (BACTRIM DS) 800-160 MG tablet; Take 1 tablet by mouth 2 times a day for 3 days.  Dispense: 6 tablet; Refill: 0    4. Stage 3b chronic kidney disease  Chronic stable problem.  Discussed and reviewed recent lab results.  She will continue with her amlodipine and her losartan.  She will go home and take her medication.  Blood pressure is above goal, but she has not taken her medication today.  She will continue follow-up with her nephrologist.      Return in about 4 weeks (around 5/26/2021) for sleep, medication and 1 week MA visit BP.    Please note that this dictation was created using voice recognition software. I have made every reasonable attempt to correct obvious  errors, but I expect that there are errors of grammar and possibly content that I did not discover before finalizing the note.

## 2021-04-28 NOTE — ASSESSMENT & PLAN NOTE
Chronic medical problem. Her initial blood pressure today is 160/80. She states that she did not take her blood pressure medication this morning. She states that she normally takes her blood pressure medications in the morning.  She has prescription for amlodipine 5 mg daily and losartan 50 mg daily.  She checks her blood pressure once a week. She states the top number can run in the 140's. She has appointment tomorrow with Dr. Hernandez her nephrologist.  She denies any chest pain, shortness of breath, dizziness, blurry vision, or headache.

## 2021-04-28 NOTE — ASSESSMENT & PLAN NOTE
Acute medical problem. The problem started 3 months ago. Her  passed away in January.  She is not practicing sleep hygiene.  She does have good family support from her son, daughter-in-law, and her neighbors who check on her frequently.

## 2021-04-28 NOTE — ASSESSMENT & PLAN NOTE
Acute medical problem.  She had urinalysis completed on 4/12/2021 that did grow E. coli.  I had sent over prescription to her pharmacy but she has not picked up.  She does not had any fever, chills, nausea, vomiting, hematuria, chest pain, shortness of breath, or confusion.

## 2021-05-05 ENCOUNTER — NON-PROVIDER VISIT (OUTPATIENT)
Dept: MEDICAL GROUP | Facility: PHYSICIAN GROUP | Age: 86
End: 2021-05-05

## 2021-05-05 VITALS — SYSTOLIC BLOOD PRESSURE: 110 MMHG | OXYGEN SATURATION: 98 % | HEART RATE: 95 BPM | DIASTOLIC BLOOD PRESSURE: 56 MMHG

## 2021-05-05 NOTE — PROGRESS NOTES
Delphine Reeves is a 89 y.o. female here for a non-provider visit for BP check    Vitals:    05/05/21 1016   BP: 110/56   Pulse: 95   SpO2: 98%     If abnormal, was an in office provider notified today? Not Indicated  Routed to PCP? Yes

## 2021-05-26 ENCOUNTER — OFFICE VISIT (OUTPATIENT)
Dept: MEDICAL GROUP | Facility: PHYSICIAN GROUP | Age: 86
End: 2021-05-26
Payer: MEDICARE

## 2021-05-26 VITALS
RESPIRATION RATE: 16 BRPM | SYSTOLIC BLOOD PRESSURE: 110 MMHG | HEIGHT: 58 IN | BODY MASS INDEX: 25.82 KG/M2 | WEIGHT: 123 LBS | HEART RATE: 81 BPM | DIASTOLIC BLOOD PRESSURE: 64 MMHG | TEMPERATURE: 97.2 F | OXYGEN SATURATION: 98 %

## 2021-05-26 DIAGNOSIS — E78.00 PURE HYPERCHOLESTEROLEMIA: ICD-10-CM

## 2021-05-26 DIAGNOSIS — D69.6 THROMBOCYTOPENIA (HCC): ICD-10-CM

## 2021-05-26 DIAGNOSIS — N18.32 STAGE 3B CHRONIC KIDNEY DISEASE: ICD-10-CM

## 2021-05-26 DIAGNOSIS — G47.9 DIFFICULTY SLEEPING: ICD-10-CM

## 2021-05-26 DIAGNOSIS — E21.3 HYPERPARATHYROIDISM (HCC): ICD-10-CM

## 2021-05-26 DIAGNOSIS — I10 ESSENTIAL HYPERTENSION: ICD-10-CM

## 2021-05-26 PROBLEM — E83.52 HYPERCALCEMIA: Status: RESOLVED | Noted: 2017-02-21 | Resolved: 2021-05-26

## 2021-05-26 PROCEDURE — 99214 OFFICE O/P EST MOD 30 MIN: CPT | Performed by: NURSE PRACTITIONER

## 2021-05-26 ASSESSMENT — FIBROSIS 4 INDEX: FIB4 SCORE: 2.76

## 2021-05-26 NOTE — PROGRESS NOTES
Subjective:     CC: Hypertension and sleep    HPI:   Nehemiah presents today with the following:    Essential hypertension  Chronic medical problem.  She is taking amlodipine 5 mg daily and losartan 50 mg daily. She has taken her medications today. Her initial blood pressure today is 142/68.  She is not checking her blood pressure at home.  She denies any chest pain, shortness of breath, blurry vision, dizziness, or headaches.    Difficulty sleeping  Chronic medical problem.  She reports that her sleeping has improved. She has increased her activity during the day. She has tried the Sleepy Time Tea and Melatonin and this helps. She uses 1-2 times a week on average.  She is practicing sleep hygiene.    CKD (chronic kidney disease) stage 3, GFR 30-59 ml/min  Chronic medical problem.  She is followed by Dr. Hernandez with nephrology every 6 months. She is taking Sensipar.  She is not taking any NSAIDs.  Last lab results:  Results for NEHEMIAH RAMSEY (MRN [id number]) as of 5/26/2021 10:18   Ref. Range 4/12/2021 08:49   GFR If  Latest Ref Range: >60 mL/min/1.73 m 2 46 (A)   GFR If Non  Latest Ref Range: >60 mL/min/1.73 m 2 38 (A)       Pure hypercholesterolemia  Chronic medical problem.  She is taking atorvastatin 20 mg daily.  She is tolerating the medication.  Denies myalgias.  Last lab results:  Results for NEHEMIAH RAMSEY (MRN [id number]) as of 5/26/2021 10:18   Ref. Range 4/12/2021 08:49   Cholesterol,Tot Latest Ref Range: 100 - 199 mg/dL 169   Triglycerides Latest Ref Range: 0 - 149 mg/dL 133   HDL Latest Ref Range: >=40 mg/dL 52   LDL Latest Ref Range: <100 mg/dL 90       Hyperparathyroidism (CMS-HCC)  Chronic medical problem.  She continues follow-up with Dr. Hernandez with nephrology.  She continues with her Sensipar 60 mg every Monday, Wednesday, and Friday.  She had recent appointment with Dr. Hernandez and is scheduled for upcoming 6-month follow-up.  Last lab result:  Results for BRAULIO  NEHEMIAH VEGA (MRN [id number]) as of 5/26/2021 10:18   Ref. Range 4/12/2021 08:52   Calcium Latest Ref Range: 8.5 - 10.5 mg/dL 9.3         Thrombocytopenia (HCC)  Chronic medical problem.  She denies any bloody stools, hematuria, epistaxis, or easy bruising.  Last lab results:  Results for NEHEMIAH RAMSEY (MRN [id number]) as of 5/26/2021 10:18   Ref. Range 10/15/2020 08:40 4/12/2021 08:49   Platelet Count Latest Ref Range: 164 - 446 K/uL 162 (L) 159 (L)       Past Medical History:   Diagnosis Date   • GERD (gastroesophageal reflux disease)    • Hyperparathyroidism (HCC)    • Hypertension        Social History     Tobacco Use   • Smoking status: Never Smoker   • Smokeless tobacco: Never Used   Vaping Use   • Vaping Use: Never used   Substance Use Topics   • Alcohol use: No     Comment: holidays   • Drug use: No       Current Outpatient Medications Ordered in Epic   Medication Sig Dispense Refill   • amLODIPine (NORVASC) 5 MG Tab TAKE 1 TABLET BY MOUTH  DAILY 90 Tab 3   • losartan (COZAAR) 50 MG Tab TAKE 1 TABLET BY MOUTH  DAILY 90 Tab 3   • atorvastatin (LIPITOR) 20 MG Tab TAKE 1 TABLET BY MOUTH  DAILY 90 Tab 3   • famotidine (PEPCID) 40 MG Tab TAKE 1 TABLET BY MOUTH  DAILY 90 Tab 3   • B Complex-C (B COMPLEX-VITAMIN C PO) Take  by mouth.     • SENSIPAR 60 MG Tab Take 1 Tab by mouth every Monday, Wednesday, and Friday.  7   • Cholecalciferol (VITAMIN D-3) 5000 units Tab Take  by mouth every Monday, Wednesday, and Friday.       No current Epic-ordered facility-administered medications on file.       Allergies:  Patient has no known allergies.    Health Maintenance: Due for annual wellness visit, zoster vaccine.  Patient declines her new wellness visit.  She is interested in her zoster vaccine, unfortunately this vaccine is unavailable in office today.  We will schedule for MA visit for next week.  She has had her second dose of her Pfizer Covid vaccine, she will bring in her immunization card at next appointment.    ROS:  Gen: no  "fevers/chills, no changes in weight  Eyes: no changes in vision  Pulm: no shortness of breath  CV: no chest pain  GI: no nausea/vomiting, no bloody stool  : no hematuria  MSk: no myalgias  Skin: no rash  Neuro: no headaches, no dizziness    Objective:     Vital signs reviewed  Exam:  /64 (BP Location: Left arm, Patient Position: Sitting)   Pulse 81   Temp 36.2 °C (97.2 °F) (Temporal)   Resp 16   Ht 1.48 m (4' 10.27\")   Wt 55.8 kg (123 lb)   SpO2 98%   BMI 25.47 kg/m²  Body mass index is 25.47 kg/m².    Gen: Alert and oriented, No apparent distress.  Neck: Neck is supple without lymphadenopathy.  Lungs: Normal effort, CTA bilaterally, no wheezes, rhonchi, or rales  CV: Regular rate and rhythm. No murmurs, rubs, or gallops.  Ext: No clubbing, cyanosis, edema.    Assessment & Plan:     89 y.o. female with the following -     1. Essential hypertension  Chronic stable problem.  On repeat by me her blood pressure is 110/64.  She will continue with her amlodipine and losartan.  We discussed checking her blood pressure at home periodically.  She verbalized understanding.  Red flags discussed.    2. Difficulty sleeping  Chronic stable problem.  She will continue practicing sleep hygiene.  She will continue her sleepy time tea and melatonin as needed.  No acute complaints today.    3. Pure hypercholesterolemia  Chronic stable problem.  Should continue with her atorvastatin.  She is up-to-date on labs.    4. Hyperparathyroidism (HCC)  Chronic stable problem.  She will continue follow-up with nephrology.  I discussed that I recommend she continue using her sensipar  She verbalized understanding.    5. Thrombocytopenia (HCC)  Chronic stable problem.  Recent labs are stable.  No acute complaints today.    6. Stage 3b chronic kidney disease (HCC)  Chronic stable problem.  Her blood pressure is at goal.  She will continue with her losartan and amlodipine.  She will continue follow-up with nephrology.  Her recent labs " are stable.  Red flags discussed.      Return in about 4 months (around 9/26/2021) for multiple issues.    Please note that this dictation was created using voice recognition software. I have made every reasonable attempt to correct obvious errors, but I expect that there are errors of grammar and possibly content that I did not discover before finalizing the note.

## 2021-05-26 NOTE — ASSESSMENT & PLAN NOTE
Chronic medical problem.  She continues follow-up with Dr. Hernandez with nephrology.  She continues with her Sensipar 60 mg every Monday, Wednesday, and Friday.  She had recent appointment with Dr. Hernandez and is scheduled for upcoming 6-month follow-up.  Last lab result:  Results for NEHEMIAH RAMSEY (MRN [id number]) as of 5/26/2021 10:18   Ref. Range 4/12/2021 08:52   Calcium Latest Ref Range: 8.5 - 10.5 mg/dL 9.3

## 2021-05-26 NOTE — ASSESSMENT & PLAN NOTE
Chronic medical problem.  She is taking atorvastatin 20 mg daily.  She is tolerating the medication.  Denies myalgias.  Last lab results:  Results for NEHEMIAH RAMSEY (MRN [id number]) as of 5/26/2021 10:18   Ref. Range 4/12/2021 08:49   Cholesterol,Tot Latest Ref Range: 100 - 199 mg/dL 169   Triglycerides Latest Ref Range: 0 - 149 mg/dL 133   HDL Latest Ref Range: >=40 mg/dL 52   LDL Latest Ref Range: <100 mg/dL 90

## 2021-05-26 NOTE — ASSESSMENT & PLAN NOTE
Chronic medical problem.  She is followed by Dr. Hernandez with nephrology every 6 months. She is taking Sensipar.  She is not taking any NSAIDs.  Last lab results:  Results for NEHEMIAH RAMSEY (MRN [id number]) as of 5/26/2021 10:18   Ref. Range 4/12/2021 08:49   GFR If  Latest Ref Range: >60 mL/min/1.73 m 2 46 (A)   GFR If Non  Latest Ref Range: >60 mL/min/1.73 m 2 38 (A)

## 2021-05-26 NOTE — ASSESSMENT & PLAN NOTE
Chronic medical problem.  She reports that her sleeping has improved. She has increased her activity during the day. She has tried the Sleepy Time Tea and Melatonin and this helps. She uses 1-2 times a week on average.  She is practicing sleep hygiene.

## 2021-05-26 NOTE — ASSESSMENT & PLAN NOTE
Chronic medical problem.  She denies any bloody stools, hematuria, epistaxis, or easy bruising.  Last lab results:  Results for NEHEMIAH RAMSEY (MRN [id number]) as of 5/26/2021 10:18   Ref. Range 10/15/2020 08:40 4/12/2021 08:49   Platelet Count Latest Ref Range: 164 - 446 K/uL 162 (L) 159 (L)

## 2021-05-26 NOTE — ASSESSMENT & PLAN NOTE
Chronic medical problem.  She is taking amlodipine 5 mg daily and losartan 50 mg daily. She has taken her medications today. Her initial blood pressure today is 142/68.  She is not checking her blood pressure at home.  She denies any chest pain, shortness of breath, blurry vision, dizziness, or headaches.

## 2021-06-04 ENCOUNTER — NON-PROVIDER VISIT (OUTPATIENT)
Dept: MEDICAL GROUP | Facility: PHYSICIAN GROUP | Age: 86
End: 2021-06-04
Payer: MEDICARE

## 2021-06-04 DIAGNOSIS — Z23 NEED FOR VACCINATION: ICD-10-CM

## 2021-06-04 PROCEDURE — 90471 IMMUNIZATION ADMIN: CPT | Performed by: FAMILY MEDICINE

## 2021-06-04 PROCEDURE — 90750 HZV VACC RECOMBINANT IM: CPT | Performed by: FAMILY MEDICINE

## 2021-06-04 NOTE — PROGRESS NOTES
"Delphine Reeves is a 89 y.o. female here for a non-provider visit for:   SHINGRIX (Shingles)    Reason for immunization: Overdue/Provider Recommended  Immunization records indicate need for vaccine: Yes, confirmed with Epic  Minimum interval has been met for this vaccine: Yes  ABN completed: Yes    VIS Dated  10/30/19 was given to patient: Yes  All IAC Questionnaire questions were answered \"No.\"    Patient tolerated injection and no adverse effects were observed or reported: Yes    Pt scheduled for next dose in series: Yes    "

## 2021-06-28 ENCOUNTER — HOSPITAL ENCOUNTER (OUTPATIENT)
Facility: MEDICAL CENTER | Age: 86
End: 2021-06-28
Attending: NURSE PRACTITIONER
Payer: MEDICARE

## 2021-06-28 ENCOUNTER — OFFICE VISIT (OUTPATIENT)
Dept: URGENT CARE | Facility: PHYSICIAN GROUP | Age: 86
End: 2021-06-28
Payer: MEDICARE

## 2021-06-28 VITALS
HEART RATE: 78 BPM | OXYGEN SATURATION: 100 % | HEIGHT: 60 IN | RESPIRATION RATE: 16 BRPM | TEMPERATURE: 98.6 F | BODY MASS INDEX: 23.56 KG/M2 | WEIGHT: 120 LBS | DIASTOLIC BLOOD PRESSURE: 64 MMHG | SYSTOLIC BLOOD PRESSURE: 110 MMHG

## 2021-06-28 DIAGNOSIS — R39.15 URINARY URGENCY: ICD-10-CM

## 2021-06-28 DIAGNOSIS — R35.0 URINARY FREQUENCY: ICD-10-CM

## 2021-06-28 DIAGNOSIS — N30.01 ACUTE CYSTITIS WITH HEMATURIA: ICD-10-CM

## 2021-06-28 DIAGNOSIS — R30.0 DYSURIA: ICD-10-CM

## 2021-06-28 LAB
APPEARANCE UR: NORMAL
BILIRUB UR STRIP-MCNC: NEGATIVE MG/DL
COLOR UR AUTO: NORMAL
GLUCOSE UR STRIP.AUTO-MCNC: NEGATIVE MG/DL
KETONES UR STRIP.AUTO-MCNC: NEGATIVE MG/DL
LEUKOCYTE ESTERASE UR QL STRIP.AUTO: NORMAL
NITRITE UR QL STRIP.AUTO: NORMAL
PH UR STRIP.AUTO: 6.5 [PH] (ref 5–8)
PROT UR QL STRIP: 100 MG/DL
RBC UR QL AUTO: NORMAL
SP GR UR STRIP.AUTO: 1.01
UROBILINOGEN UR STRIP-MCNC: 0.2 MG/DL

## 2021-06-28 PROCEDURE — 87186 SC STD MICRODIL/AGAR DIL: CPT

## 2021-06-28 PROCEDURE — 81002 URINALYSIS NONAUTO W/O SCOPE: CPT | Performed by: NURSE PRACTITIONER

## 2021-06-28 PROCEDURE — 87086 URINE CULTURE/COLONY COUNT: CPT

## 2021-06-28 PROCEDURE — 87077 CULTURE AEROBIC IDENTIFY: CPT | Mod: 91

## 2021-06-28 PROCEDURE — 99214 OFFICE O/P EST MOD 30 MIN: CPT | Performed by: NURSE PRACTITIONER

## 2021-06-28 RX ORDER — PHENAZOPYRIDINE HYDROCHLORIDE 200 MG/1
200 TABLET, FILM COATED ORAL 3 TIMES DAILY
Qty: 6 TABLET | Refills: 0 | Status: SHIPPED | OUTPATIENT
Start: 2021-06-28 | End: 2021-06-30

## 2021-06-28 RX ORDER — CEFDINIR 300 MG/1
300 CAPSULE ORAL EVERY 12 HOURS
Qty: 10 CAPSULE | Refills: 0 | Status: SHIPPED | OUTPATIENT
Start: 2021-06-28 | End: 2021-07-03

## 2021-06-28 ASSESSMENT — ENCOUNTER SYMPTOMS
ABDOMINAL PAIN: 0
FEVER: 0
FLANK PAIN: 0
VOMITING: 0
CHILLS: 0
NAUSEA: 0

## 2021-06-28 ASSESSMENT — FIBROSIS 4 INDEX: FIB4 SCORE: 2.76

## 2021-06-28 NOTE — PROGRESS NOTES
Subjective:     Delphine Reeves is a 89 y.o. female who presents for UTI (burning ,odor, frequency, itchy x2 days )      UTI  This is a new problem. The current episode started in the past 7 days (2 days ago Delphine developed dysuria, frequency and urgency). The problem occurs constantly. The problem has been gradually worsening. Associated symptoms include urinary symptoms. Pertinent negatives include no abdominal pain, chills, fever, nausea or vomiting. Nothing aggravates the symptoms. She has tried nothing for the symptoms.       Review of Systems   Constitutional: Negative for chills, fever and malaise/fatigue.   Gastrointestinal: Negative for abdominal pain, nausea and vomiting.   Genitourinary: Positive for dysuria, frequency and urgency. Negative for flank pain and hematuria.       PMH:   Past Medical History:   Diagnosis Date   • GERD (gastroesophageal reflux disease)    • Hyperparathyroidism (HCC)    • Hypertension      ALLERGIES: No Known Allergies  SURGHX:   Past Surgical History:   Procedure Laterality Date   • KNEE REPLACEMENT, TOTAL Left      SOCHX:   Social History     Socioeconomic History   • Marital status:      Spouse name: Not on file   • Number of children: Not on file   • Years of education: Not on file   • Highest education level: Not on file   Occupational History   • Not on file   Tobacco Use   • Smoking status: Never Smoker   • Smokeless tobacco: Never Used   Vaping Use   • Vaping Use: Never used   Substance and Sexual Activity   • Alcohol use: No     Comment: holidays   • Drug use: No   • Sexual activity: Not Currently     Comment:  x 71 yrs, 3 sons, [facility], [place]    Other Topics Concern   • Not on file   Social History Narrative   • Not on file     Social Determinants of Health     Financial Resource Strain:    • Difficulty of Paying Living Expenses:    Food Insecurity:    • Worried About Running Out of Food in the Last Year:    • Ran Out of Food in the Last Year:     Transportation Needs:    • Lack of Transportation (Medical):    • Lack of Transportation (Non-Medical):    Physical Activity:    • Days of Exercise per Week:    • Minutes of Exercise per Session:    Stress:    • Feeling of Stress :    Social Connections:    • Frequency of Communication with Friends and Family:    • Frequency of Social Gatherings with Friends and Family:    • Attends [employer] Services:    • Active Member of Clubs or Organizations:    • Attends Club or Organization Meetings:    • Marital Status:    Intimate Partner Violence:    • Fear of Current or Ex-Partner:    • Emotionally Abused:    • Physically Abused:    • Sexually Abused:      FH:   Family History   Problem Relation Age of Onset   • Heart Disease Mother    • Heart Disease Father    • Lung Disease Sister    • Heart Disease Brother    • No Known Problems Brother    • Cancer Neg Hx    • Diabetes Neg Hx          Objective:   /64   Pulse 78   Temp 37 °C (98.6 °F) (Temporal)   Resp 16   Ht 1.524 m (5')   Wt 54.4 kg (120 lb)   SpO2 100%   BMI 23.44 kg/m²     Physical Exam  Vitals and nursing note reviewed.   Constitutional:       General: She is not in acute distress.     Appearance: Normal appearance. She is not ill-appearing.   HENT:      Head: Normocephalic and atraumatic.      Right Ear: External ear normal.      Left Ear: External ear normal.      Nose: No congestion or rhinorrhea.      Mouth/Throat:      Mouth: Mucous membranes are moist.   Eyes:      Extraocular Movements: Extraocular movements intact.      Pupils: Pupils are equal, round, and reactive to light.   Cardiovascular:      Rate and Rhythm: Normal rate and regular rhythm.      Pulses: Normal pulses.      Heart sounds: Normal heart sounds.   Pulmonary:      Effort: Pulmonary effort is normal.      Breath sounds: Normal breath sounds.   Abdominal:      General: Abdomen is flat. Bowel sounds are normal.      Palpations: Abdomen is soft.      Tenderness: There is no  abdominal tenderness. There is no right CVA tenderness or left CVA tenderness.   Musculoskeletal:         General: Normal range of motion.      Cervical back: Normal range of motion and neck supple.   Skin:     General: Skin is warm and dry.      Capillary Refill: Capillary refill takes less than 2 seconds.   Neurological:      General: No focal deficit present.      Mental Status: She is alert and oriented to person, place, and time. Mental status is at baseline.   Psychiatric:         Mood and Affect: Mood normal.         Behavior: Behavior normal.         Thought Content: Thought content normal.         Judgment: Judgment normal.       POCT urine: Pos leuk large, pro 100, blood small  Assessment/Plan:   Assessment    1. Acute cystitis with hematuria  Urine Culture    cefdinir (OMNICEF) 300 MG Cap   2. Dysuria  POCT Urinalysis    Urine Culture    phenazopyridine (PYRIDIUM) 200 MG Tab   3. Urinary frequency  POCT Urinalysis    Urine Culture    phenazopyridine (PYRIDIUM) 200 MG Tab   4. Urinary urgency  POCT Urinalysis    Urine Culture    phenazopyridine (PYRIDIUM) 200 MG Tab   Her urine was sent for culture and I will call her with results.  Prescription was sent in to preferred pharmacy. AVS was given and reviewed with patient. Patient educated on red flags of UTI and encouraged to seek care back in UC or ER for  fever, chills, flank pain, or worsening symptoms.

## 2021-08-11 ENCOUNTER — OFFICE VISIT (OUTPATIENT)
Dept: URGENT CARE | Facility: PHYSICIAN GROUP | Age: 86
End: 2021-08-11
Payer: MEDICARE

## 2021-08-11 VITALS
TEMPERATURE: 97.9 F | WEIGHT: 125 LBS | HEART RATE: 81 BPM | BODY MASS INDEX: 25.2 KG/M2 | SYSTOLIC BLOOD PRESSURE: 104 MMHG | RESPIRATION RATE: 16 BRPM | OXYGEN SATURATION: 98 % | HEIGHT: 59 IN | DIASTOLIC BLOOD PRESSURE: 52 MMHG

## 2021-08-11 DIAGNOSIS — M10.9 ACUTE GOUT INVOLVING TOE OF LEFT FOOT, UNSPECIFIED CAUSE: ICD-10-CM

## 2021-08-11 PROCEDURE — 99213 OFFICE O/P EST LOW 20 MIN: CPT | Performed by: PHYSICIAN ASSISTANT

## 2021-08-11 RX ORDER — CEPHALEXIN 500 MG/1
500 CAPSULE ORAL 2 TIMES DAILY
Qty: 10 CAPSULE | Refills: 0 | Status: SHIPPED | OUTPATIENT
Start: 2021-08-11 | End: 2021-08-16

## 2021-08-11 RX ORDER — PREDNISONE 10 MG/1
30 TABLET ORAL DAILY
Qty: 15 TABLET | Refills: 0 | Status: SHIPPED | OUTPATIENT
Start: 2021-08-11 | End: 2021-08-16

## 2021-08-11 ASSESSMENT — ENCOUNTER SYMPTOMS
EYE PAIN: 0
HEADACHES: 0
CONSTIPATION: 0
CHILLS: 0
SHORTNESS OF BREATH: 0
VOMITING: 0
COUGH: 0
FEVER: 0
DIARRHEA: 0
SORE THROAT: 0
NAUSEA: 0
ABDOMINAL PAIN: 0
MYALGIAS: 0

## 2021-08-11 ASSESSMENT — FIBROSIS 4 INDEX: FIB4 SCORE: 2.76

## 2021-08-11 NOTE — PROGRESS NOTES
"Subjective:   Delphine Reeves is a 89 y.o. female who presents for Toe Pain (left great toe pain and inflamation)      HPI:  89-year-old female presents with left first MTP pain, swelling, redness that is been ongoing for around 4 days.  She has not recall any injury or trauma.  She denies history of gout.  She reports that around a year ago she had a similar issue with the right first MTP which improved with antibiotics.  She is very concerned about infection.    Review of Systems   Constitutional: Negative for chills and fever.   HENT: Negative for congestion, ear pain and sore throat.    Eyes: Negative for pain.   Respiratory: Negative for cough and shortness of breath.    Cardiovascular: Negative for chest pain.   Gastrointestinal: Negative for abdominal pain, constipation, diarrhea, nausea and vomiting.   Genitourinary: Negative for dysuria.   Musculoskeletal: Negative for myalgias.   Skin: Negative for rash.   Neurological: Negative for headaches.       Medications:    • amLODIPine Tabs  • atorvastatin Tabs  • B COMPLEX-VITAMIN C PO  • cephALEXin Caps  • famotidine Tabs  • losartan Tabs  • predniSONE Tabs  • Sensipar Tabs  • Vitamin D-3 Tabs    Allergies: Patient has no known allergies.    Problem List: Delphine Reeves does not have any pertinent problems on file.    Surgical History:  Past Surgical History:   Procedure Laterality Date   • KNEE REPLACEMENT, TOTAL Left        Past Social Hx: Delphine Reeves  reports that she has never smoked. She has never used smokeless tobacco. She reports that she does not drink alcohol and does not use drugs.     Past Family Hx:  Delphine Reeves family history includes Heart Disease in her brother, father, and mother; Lung Disease in her sister; No Known Problems in her brother.     Problem list, medications, and allergies reviewed by myself today in Epic.     Objective:     /52   Pulse 81   Temp 36.6 °C (97.9 °F)   Resp 16   Ht 1.499 m (4' 11\")   Wt 56.7 kg (125 lb) "   SpO2 98%   BMI 25.25 kg/m²     Physical Exam  Vitals reviewed.   Constitutional:       Appearance: Normal appearance.   HENT:      Head: Normocephalic and atraumatic.      Right Ear: External ear normal.      Left Ear: External ear normal.      Nose: Nose normal.      Mouth/Throat:      Mouth: Mucous membranes are moist.   Eyes:      Conjunctiva/sclera: Conjunctivae normal.   Cardiovascular:      Rate and Rhythm: Normal rate.   Pulmonary:      Effort: Pulmonary effort is normal.   Musculoskeletal:      Comments: Left first MTP with erythema, edema, tenderness, and pain with range of motion.  Erythema is limited to overlying the joint.  No fluctuance.  Brisk cap refill distally.  Distally neurovascularly intact.  No streaking lymphangitis   Skin:     General: Skin is warm and dry.      Capillary Refill: Capillary refill takes less than 2 seconds.   Neurological:      Mental Status: She is alert and oriented to person, place, and time.         Assessment/Plan:     Diagnosis and associated orders:     1. Acute gout involving toe of left foot, unspecified cause  cephALEXin (KEFLEX) 500 MG Cap    predniSONE (DELTASONE) 10 MG Tab      Comments/MDM:     • History of same on the right first toe and patient's history of lack of trauma and the history and physical support podagra.  Wait-and-see prescription for Keflex, patient is very concerned about an infection and she may initiate this medication if she is not showing improvement with prednisone.  In light of her kidney function she should avoid anti-inflammatories and hopefully prednisone is the most reasonable safe alternative for her.  I recommended follow-up with her primary as soon as reasonable to ensure resolution of symptoms         Differential diagnosis, natural history, supportive care, and indications for immediate follow-up discussed.    Advised the patient to follow-up with the primary care physician for recheck, reevaluation, and consideration of further  management.    Please note that this dictation was created using voice recognition software. I have made a reasonable attempt to correct obvious errors, but I expect that there are errors of grammar and possibly content that I did not discover before finalizing the note.    This note was electronically signed by Antonio Foster PA-C

## 2021-09-17 ENCOUNTER — HOSPITAL ENCOUNTER (OUTPATIENT)
Dept: LAB | Facility: MEDICAL CENTER | Age: 86
End: 2021-09-17
Attending: NURSE PRACTITIONER
Payer: MEDICARE

## 2021-09-17 ENCOUNTER — HOSPITAL ENCOUNTER (OUTPATIENT)
Dept: LAB | Facility: MEDICAL CENTER | Age: 86
End: 2021-09-17
Attending: INTERNAL MEDICINE
Payer: MEDICARE

## 2021-09-17 DIAGNOSIS — D69.6 THROMBOCYTOPENIA (HCC): ICD-10-CM

## 2021-09-17 DIAGNOSIS — N18.32 STAGE 3B CHRONIC KIDNEY DISEASE: ICD-10-CM

## 2021-09-17 LAB
ALBUMIN SERPL BCP-MCNC: 4.4 G/DL (ref 3.2–4.9)
ANION GAP SERPL CALC-SCNC: 12 MMOL/L (ref 7–16)
BASOPHILS # BLD AUTO: 0.2 % (ref 0–1.8)
BASOPHILS # BLD: 0.01 K/UL (ref 0–0.12)
BUN SERPL-MCNC: 22 MG/DL (ref 8–22)
BUN SERPL-MCNC: 23 MG/DL (ref 8–22)
CALCIUM SERPL-MCNC: 9.3 MG/DL (ref 8.5–10.5)
CALCIUM SERPL-MCNC: 9.6 MG/DL (ref 8.5–10.5)
CHLORIDE SERPL-SCNC: 102 MMOL/L (ref 96–112)
CHLORIDE SERPL-SCNC: 105 MMOL/L (ref 96–112)
CO2 SERPL-SCNC: 25 MMOL/L (ref 20–33)
CO2 SERPL-SCNC: 26 MMOL/L (ref 20–33)
CREAT SERPL-MCNC: 1.48 MG/DL (ref 0.5–1.4)
CREAT SERPL-MCNC: 1.57 MG/DL (ref 0.5–1.4)
EOSINOPHIL # BLD AUTO: 0 K/UL (ref 0–0.51)
EOSINOPHIL NFR BLD: 0 % (ref 0–6.9)
ERYTHROCYTE [DISTWIDTH] IN BLOOD BY AUTOMATED COUNT: 49 FL (ref 35.9–50)
FASTING STATUS PATIENT QL REPORTED: NORMAL
GLUCOSE SERPL-MCNC: 96 MG/DL (ref 65–99)
GLUCOSE SERPL-MCNC: 96 MG/DL (ref 65–99)
HCT VFR BLD AUTO: 37.7 % (ref 37–47)
HGB BLD-MCNC: 12.2 G/DL (ref 12–16)
IMM GRANULOCYTES # BLD AUTO: 0.03 K/UL (ref 0–0.11)
IMM GRANULOCYTES NFR BLD AUTO: 0.6 % (ref 0–0.9)
LYMPHOCYTES # BLD AUTO: 1.75 K/UL (ref 1–4.8)
LYMPHOCYTES NFR BLD: 36.8 % (ref 22–41)
MCH RBC QN AUTO: 31.3 PG (ref 27–33)
MCHC RBC AUTO-ENTMCNC: 32.4 G/DL (ref 33.6–35)
MCV RBC AUTO: 96.7 FL (ref 81.4–97.8)
MONOCYTES # BLD AUTO: 0.5 K/UL (ref 0–0.85)
MONOCYTES NFR BLD AUTO: 10.5 % (ref 0–13.4)
NEUTROPHILS # BLD AUTO: 2.47 K/UL (ref 2–7.15)
NEUTROPHILS NFR BLD: 51.9 % (ref 44–72)
NRBC # BLD AUTO: 0 K/UL
NRBC BLD-RTO: 0 /100 WBC
PHOSPHATE SERPL-MCNC: 3.2 MG/DL (ref 2.5–4.5)
PLATELET # BLD AUTO: 181 K/UL (ref 164–446)
PMV BLD AUTO: 11.8 FL (ref 9–12.9)
POTASSIUM SERPL-SCNC: 4.5 MMOL/L (ref 3.6–5.5)
POTASSIUM SERPL-SCNC: 5 MMOL/L (ref 3.6–5.5)
PTH-INTACT SERPL-MCNC: 70.9 PG/ML (ref 14–72)
RBC # BLD AUTO: 3.9 M/UL (ref 4.2–5.4)
SODIUM SERPL-SCNC: 142 MMOL/L (ref 135–145)
SODIUM SERPL-SCNC: 143 MMOL/L (ref 135–145)
WBC # BLD AUTO: 4.8 K/UL (ref 4.8–10.8)

## 2021-09-17 PROCEDURE — 80048 BASIC METABOLIC PNL TOTAL CA: CPT

## 2021-09-17 PROCEDURE — 36415 COLL VENOUS BLD VENIPUNCTURE: CPT

## 2021-09-17 PROCEDURE — 80069 RENAL FUNCTION PANEL: CPT

## 2021-09-17 PROCEDURE — 85025 COMPLETE CBC W/AUTO DIFF WBC: CPT

## 2021-09-17 PROCEDURE — 83970 ASSAY OF PARATHORMONE: CPT

## 2021-09-17 NOTE — PROGRESS NOTES
Patient here for lab appointment. Patient asking Par to clarify with provider for lab results. Reviewed my last progress note and I did not place lab orders. She is followed by nephrology for her kidneys. Will check CBC and BMP.

## 2021-09-29 ENCOUNTER — OFFICE VISIT (OUTPATIENT)
Dept: MEDICAL GROUP | Facility: PHYSICIAN GROUP | Age: 86
End: 2021-09-29
Payer: MEDICARE

## 2021-09-29 VITALS
WEIGHT: 125 LBS | RESPIRATION RATE: 16 BRPM | HEIGHT: 59 IN | HEART RATE: 80 BPM | BODY MASS INDEX: 25.2 KG/M2 | OXYGEN SATURATION: 98 % | DIASTOLIC BLOOD PRESSURE: 58 MMHG | SYSTOLIC BLOOD PRESSURE: 120 MMHG | TEMPERATURE: 97.4 F

## 2021-09-29 DIAGNOSIS — I10 ESSENTIAL HYPERTENSION: ICD-10-CM

## 2021-09-29 DIAGNOSIS — N18.32 STAGE 3B CHRONIC KIDNEY DISEASE: ICD-10-CM

## 2021-09-29 DIAGNOSIS — E78.00 PURE HYPERCHOLESTEROLEMIA: ICD-10-CM

## 2021-09-29 DIAGNOSIS — Z23 NEED FOR VACCINATION: ICD-10-CM

## 2021-09-29 PROCEDURE — 90662 IIV NO PRSV INCREASED AG IM: CPT | Performed by: NURSE PRACTITIONER

## 2021-09-29 PROCEDURE — 99214 OFFICE O/P EST MOD 30 MIN: CPT | Mod: 25 | Performed by: NURSE PRACTITIONER

## 2021-09-29 PROCEDURE — G0008 ADMIN INFLUENZA VIRUS VAC: HCPCS | Performed by: NURSE PRACTITIONER

## 2021-09-29 ASSESSMENT — FIBROSIS 4 INDEX: FIB4 SCORE: 2.42

## 2021-09-29 NOTE — ASSESSMENT & PLAN NOTE
Chronic medical problem.  She is taking atorvastatin 20 mg daily.  She is tolerating the medication.  She is up-to-date on labs.  Denies myalgias.

## 2021-09-29 NOTE — ASSESSMENT & PLAN NOTE
Chronic medical problem.  Her initial blood pressure today is 136/70.  She is taking amlodipine 5 mg daily and losartan 50 mg daily. She checks her blood pressures intermittently at home and does get readings ranging 130's/70's. She has taken her medications this morning. Denies chest pain, shortness of breath, dizziness, blurry vision, or headaches.

## 2021-09-29 NOTE — PROGRESS NOTES
Subjective:     CC: Hypertension and immunizations    HPI:   Nehemiah presents today with the following:    Essential hypertension  Chronic medical problem.  Her initial blood pressure today is 136/70.  She is taking amlodipine 5 mg daily and losartan 50 mg daily. She checks her blood pressures intermittently at home and does get readings ranging 130's/70's. She has taken her medications this morning. Denies chest pain, shortness of breath, dizziness, blurry vision, or headaches.    Pure hypercholesterolemia  Chronic medical problem.  She is taking atorvastatin 20 mg daily.  She is tolerating the medication.  She is up-to-date on labs.  Denies myalgias.    Stage 3b chronic kidney disease (HCC)  Chronic medical problem.  She is followed by Dr. Hernandez with nephrology.  She reports she has upcoming appointment next week with Dr. Hernandez's associate.  She had recent labs completed.  She continues with her Sensipar 60 mg every Monday, Wednesday, and Friday.  She is not taking any NSAIDs.  Her blood pressure is at goal today.  Last lab results:    Results for NEHEMIAH RAMSEY (MRN [id number]) as of 9/29/2021 10:03   Ref. Range 9/17/2021 08:41   Bun Latest Ref Range: 8 - 22 mg/dL 22   Creatinine Latest Ref Range: 0.50 - 1.40 mg/dL 1.48 (H)   GFR If  Latest Ref Range: >60 mL/min/1.73 m 2 40 (A)   GFR If Non  Latest Ref Range: >60 mL/min/1.73 m 2 33 (A)       Past Medical History:   Diagnosis Date   • GERD (gastroesophageal reflux disease)    • Hyperparathyroidism (HCC)    • Hypertension        Social History     Tobacco Use   • Smoking status: Never Smoker   • Smokeless tobacco: Never Used   Vaping Use   • Vaping Use: Never used   Substance Use Topics   • Alcohol use: No     Comment: holidays   • Drug use: No       Current Outpatient Medications Ordered in Epic   Medication Sig Dispense Refill   • amLODIPine (NORVASC) 5 MG Tab TAKE 1 TABLET BY MOUTH  DAILY 90 Tab 3   • losartan (COZAAR) 50 MG Tab  "TAKE 1 TABLET BY MOUTH  DAILY 90 Tab 3   • atorvastatin (LIPITOR) 20 MG Tab TAKE 1 TABLET BY MOUTH  DAILY 90 Tab 3   • famotidine (PEPCID) 40 MG Tab TAKE 1 TABLET BY MOUTH  DAILY 90 Tab 3   • B Complex-C (B COMPLEX-VITAMIN C PO) Take  by mouth.     • SENSIPAR 60 MG Tab Take 1 Tab by mouth every Monday, Wednesday, and Friday.  7   • Cholecalciferol (VITAMIN D-3) 5000 units Tab Take  by mouth every Monday, Wednesday, and Friday.       No current Epic-ordered facility-administered medications on file.       Allergies:  Patient has no known allergies.    Health Maintenance: Due for annual wellness visit, influenza vaccine and zoster vaccine.  She is interested in influenza vaccine today.  We discussed that her zoster vaccine should be completed at the pharmacy due to her insurance.  She verbalized understanding.  She declines her annual wellness visit today.    ROS:  Per HPI    Objective:     Vital signs reviewed  Exam:  /58 (BP Location: Left arm, Patient Position: Sitting)   Pulse 80   Temp 36.3 °C (97.4 °F) (Temporal)   Resp 16   Ht 1.499 m (4' 11\")   Wt 56.7 kg (125 lb)   SpO2 98%   BMI 25.25 kg/m²  Body mass index is 25.25 kg/m².    Gen: Alert and oriented, No apparent distress.  Lungs: Normal effort, CTA bilaterally, no wheezes, rhonchi, or rales  CV: Regular rate and rhythm. No murmurs, rubs, or gallops.  Ext: No clubbing, cyanosis, edema.      Assessment & Plan:     89 y.o. female with the following -     1. Essential hypertension  Chronic stable problem.  On repeat by me her blood pressure today is 120/58.  She will continue with her amlodipine 5 mg daily and losartan 50 mg daily.  She will continue with home blood pressure monitoring.    2. Pure hypercholesterolemia  Chronic stable problem.  She will continue with her atorvastatin 20 mg daily.  No acute complaints today.    3. Stage 3b chronic kidney disease (HCC)  Chronic stable problem.  She will continue to avoid NSAIDs.  Her blood pressure is " at goal today.  She will continue with her amlodipine and losartan.  She will continue follow-up with nephrology.    4. Need for vaccination  Acute uncomplicated problem.  Vaccine indicated.  Patient is in agreement. I have placed the below orders and discussed them with an approved delegating provider. The MA is performing the below orders under the direction of Dr. Jensen.  - INFLUENZA VACCINE, HIGH DOSE (65+ ONLY)        Return in about 4 months (around 1/29/2022) for Hypertension, cholesterol, kidney disease .    Please note that this dictation was created using voice recognition software. I have made every reasonable attempt to correct obvious errors, but I expect that there are errors of grammar and possibly content that I did not discover before finalizing the note.

## 2021-10-28 DIAGNOSIS — I10 ESSENTIAL HYPERTENSION: ICD-10-CM

## 2021-10-28 DIAGNOSIS — K21.9 GASTROESOPHAGEAL REFLUX DISEASE WITHOUT ESOPHAGITIS: ICD-10-CM

## 2021-10-28 DIAGNOSIS — E78.00 PURE HYPERCHOLESTEROLEMIA: ICD-10-CM

## 2021-10-28 RX ORDER — LOSARTAN POTASSIUM 50 MG/1
TABLET ORAL
Qty: 90 TABLET | Refills: 3 | Status: ON HOLD | OUTPATIENT
Start: 2021-10-28 | End: 2022-08-09

## 2021-10-28 RX ORDER — AMLODIPINE BESYLATE 5 MG/1
TABLET ORAL
Qty: 90 TABLET | Refills: 3 | Status: SHIPPED | OUTPATIENT
Start: 2021-10-28 | End: 2022-07-25

## 2021-10-28 RX ORDER — AMLODIPINE BESYLATE 5 MG/1
TABLET ORAL
Qty: 90 TABLET | Refills: 3 | OUTPATIENT
Start: 2021-10-28

## 2021-10-28 RX ORDER — FAMOTIDINE 40 MG/1
TABLET, FILM COATED ORAL
Qty: 90 TABLET | Refills: 3 | Status: SHIPPED | OUTPATIENT
Start: 2021-10-28 | End: 2022-09-01

## 2021-10-28 RX ORDER — ATORVASTATIN CALCIUM 20 MG/1
TABLET, FILM COATED ORAL
Qty: 90 TABLET | Refills: 3 | Status: ON HOLD | OUTPATIENT
Start: 2021-10-28 | End: 2022-08-09

## 2021-10-28 NOTE — TELEPHONE ENCOUNTER
Received request via: Patient    Was the patient seen in the last year in this department? Yes    Does the patient have an active prescription (recently filled or refills available) for medication(s) requested? Pharmacy states script , need new order

## 2021-10-28 NOTE — TELEPHONE ENCOUNTER
Received request via: Pharmacy    Was the patient seen in the last year in this department? Yes    Does the patient have an active prescription (recently filled or refills available) for medication(s) requested? Pharmacy states Rx  and needs new order

## 2021-10-28 NOTE — TELEPHONE ENCOUNTER
Requested Prescriptions     Signed Prescriptions Disp Refills   • amLODIPine (NORVASC) 5 MG Tab 90 Tablet 3     Sig: TAKE 1 TABLET BY MOUTH  DAILY     Authorizing Provider: ALLISON SIMPSON A.P.R.N.

## 2021-10-28 NOTE — TELEPHONE ENCOUNTER
Requested Prescriptions     Signed Prescriptions Disp Refills   • famotidine (PEPCID) 40 MG Tab 90 Tablet 3     Sig: TAKE 1 TABLET BY MOUTH  DAILY     Authorizing Provider: ALLISON SIMPSON   • losartan (COZAAR) 50 MG Tab 90 Tablet 3     Sig: TAKE 1 TABLET BY MOUTH  DAILY     Authorizing Provider: ALLISON SIMPSON   • atorvastatin (LIPITOR) 20 MG Tab 90 Tablet 3     Sig: TAKE 1 TABLET BY MOUTH  DAILY     Authorizing Provider: ALLISON SIMPSON     Refused Prescriptions Disp Refills   • amLODIPine (NORVASC) 5 MG Tab [Pharmacy Med Name: amLODIPine Besylate 5 MG Oral Tablet] 90 Tablet 3     Sig: TAKE 1 TABLET BY MOUTH  DAILY     Refused By: ALBERTO GUTIERREZ     Reason for Refusal: Request already responded to by other means (e.g. phone or fax)     RAQUEL Vizcaino

## 2022-01-19 ENCOUNTER — HOSPITAL ENCOUNTER (OUTPATIENT)
Dept: LAB | Facility: MEDICAL CENTER | Age: 87
End: 2022-01-19
Attending: NURSE PRACTITIONER
Payer: MEDICARE

## 2022-01-19 LAB
25(OH)D3 SERPL-MCNC: 80 NG/ML (ref 30–100)
ALBUMIN SERPL BCP-MCNC: 4.6 G/DL (ref 3.2–4.9)
ALBUMIN/GLOB SERPL: 2 G/DL
ALP SERPL-CCNC: 67 U/L (ref 30–99)
ALT SERPL-CCNC: 19 U/L (ref 2–50)
ANION GAP SERPL CALC-SCNC: 14 MMOL/L (ref 7–16)
APPEARANCE UR: ABNORMAL
AST SERPL-CCNC: 15 U/L (ref 12–45)
BACTERIA #/AREA URNS HPF: ABNORMAL /HPF
BASOPHILS # BLD AUTO: 0.2 % (ref 0–1.8)
BASOPHILS # BLD: 0.01 K/UL (ref 0–0.12)
BILIRUB SERPL-MCNC: 0.5 MG/DL (ref 0.1–1.5)
BILIRUB UR QL STRIP.AUTO: NEGATIVE
BUN SERPL-MCNC: 32 MG/DL (ref 8–22)
CALCIUM SERPL-MCNC: 10.4 MG/DL (ref 8.5–10.5)
CHLORIDE SERPL-SCNC: 101 MMOL/L (ref 96–112)
CHOLEST SERPL-MCNC: 152 MG/DL (ref 100–199)
CO2 SERPL-SCNC: 23 MMOL/L (ref 20–33)
COLOR UR: YELLOW
CREAT SERPL-MCNC: 1.73 MG/DL (ref 0.5–1.4)
CREAT UR-MCNC: 76.95 MG/DL
CREAT UR-MCNC: 82.74 MG/DL
EOSINOPHIL # BLD AUTO: 0 K/UL (ref 0–0.51)
EOSINOPHIL NFR BLD: 0 % (ref 0–6.9)
EPI CELLS #/AREA URNS HPF: ABNORMAL /HPF
ERYTHROCYTE [DISTWIDTH] IN BLOOD BY AUTOMATED COUNT: 50.3 FL (ref 35.9–50)
FASTING STATUS PATIENT QL REPORTED: NORMAL
GLOBULIN SER CALC-MCNC: 2.3 G/DL (ref 1.9–3.5)
GLUCOSE SERPL-MCNC: 97 MG/DL (ref 65–99)
GLUCOSE UR STRIP.AUTO-MCNC: NEGATIVE MG/DL
HCT VFR BLD AUTO: 39.2 % (ref 37–47)
HDLC SERPL-MCNC: 60 MG/DL
HGB BLD-MCNC: 12.4 G/DL (ref 12–16)
HYALINE CASTS #/AREA URNS LPF: ABNORMAL /LPF
IMM GRANULOCYTES # BLD AUTO: 0.02 K/UL (ref 0–0.11)
IMM GRANULOCYTES NFR BLD AUTO: 0.3 % (ref 0–0.9)
KETONES UR STRIP.AUTO-MCNC: NEGATIVE MG/DL
LDLC SERPL CALC-MCNC: 75 MG/DL
LEUKOCYTE ESTERASE UR QL STRIP.AUTO: ABNORMAL
LYMPHOCYTES # BLD AUTO: 1.9 K/UL (ref 1–4.8)
LYMPHOCYTES NFR BLD: 30 % (ref 22–41)
MAGNESIUM SERPL-MCNC: 2.1 MG/DL (ref 1.5–2.5)
MCH RBC QN AUTO: 30.4 PG (ref 27–33)
MCHC RBC AUTO-ENTMCNC: 31.6 G/DL (ref 33.6–35)
MCV RBC AUTO: 96.1 FL (ref 81.4–97.8)
MICRO URNS: ABNORMAL
MICROALBUMIN UR-MCNC: 8.3 MG/DL
MICROALBUMIN/CREAT UR: 108 MG/G (ref 0–30)
MONOCYTES # BLD AUTO: 0.64 K/UL (ref 0–0.85)
MONOCYTES NFR BLD AUTO: 10.1 % (ref 0–13.4)
NEUTROPHILS # BLD AUTO: 3.76 K/UL (ref 2–7.15)
NEUTROPHILS NFR BLD: 59.4 % (ref 44–72)
NITRITE UR QL STRIP.AUTO: NEGATIVE
NRBC # BLD AUTO: 0 K/UL
NRBC BLD-RTO: 0 /100 WBC
PH UR STRIP.AUTO: 7 [PH] (ref 5–8)
PHOSPHATE SERPL-MCNC: 3.1 MG/DL (ref 2.5–4.5)
PLATELET # BLD AUTO: 153 K/UL (ref 164–446)
PMV BLD AUTO: 12.1 FL (ref 9–12.9)
POTASSIUM SERPL-SCNC: 4.3 MMOL/L (ref 3.6–5.5)
PROT SERPL-MCNC: 6.9 G/DL (ref 6–8.2)
PROT UR QL STRIP: 30 MG/DL
PROT UR-MCNC: 24 MG/DL (ref 0–15)
PROT/CREAT UR: 290 MG/G (ref 10–107)
PTH-INTACT SERPL-MCNC: 48.9 PG/ML (ref 14–72)
RBC # BLD AUTO: 4.08 M/UL (ref 4.2–5.4)
RBC # URNS HPF: ABNORMAL /HPF
RBC UR QL AUTO: NEGATIVE
SODIUM SERPL-SCNC: 138 MMOL/L (ref 135–145)
SP GR UR STRIP.AUTO: 1.01
TRIGL SERPL-MCNC: 86 MG/DL (ref 0–149)
URATE SERPL-MCNC: 6.6 MG/DL (ref 1.9–8.2)
UROBILINOGEN UR STRIP.AUTO-MCNC: 0.2 MG/DL
WBC # BLD AUTO: 6.3 K/UL (ref 4.8–10.8)
WBC #/AREA URNS HPF: ABNORMAL /HPF

## 2022-01-19 PROCEDURE — 84156 ASSAY OF PROTEIN URINE: CPT

## 2022-01-19 PROCEDURE — 80053 COMPREHEN METABOLIC PANEL: CPT

## 2022-01-19 PROCEDURE — 83735 ASSAY OF MAGNESIUM: CPT

## 2022-01-19 PROCEDURE — 87186 SC STD MICRODIL/AGAR DIL: CPT

## 2022-01-19 PROCEDURE — 81001 URINALYSIS AUTO W/SCOPE: CPT

## 2022-01-19 PROCEDURE — 84550 ASSAY OF BLOOD/URIC ACID: CPT

## 2022-01-19 PROCEDURE — 80061 LIPID PANEL: CPT

## 2022-01-19 PROCEDURE — 83970 ASSAY OF PARATHORMONE: CPT

## 2022-01-19 PROCEDURE — 87086 URINE CULTURE/COLONY COUNT: CPT

## 2022-01-19 PROCEDURE — 85025 COMPLETE CBC W/AUTO DIFF WBC: CPT

## 2022-01-19 PROCEDURE — 84100 ASSAY OF PHOSPHORUS: CPT

## 2022-01-19 PROCEDURE — 82043 UR ALBUMIN QUANTITATIVE: CPT

## 2022-01-19 PROCEDURE — 82570 ASSAY OF URINE CREATININE: CPT

## 2022-01-19 PROCEDURE — 36415 COLL VENOUS BLD VENIPUNCTURE: CPT

## 2022-01-19 PROCEDURE — 82306 VITAMIN D 25 HYDROXY: CPT

## 2022-01-19 PROCEDURE — 87077 CULTURE AEROBIC IDENTIFY: CPT

## 2022-02-02 ENCOUNTER — OFFICE VISIT (OUTPATIENT)
Dept: MEDICAL GROUP | Facility: PHYSICIAN GROUP | Age: 87
End: 2022-02-02
Payer: MEDICARE

## 2022-02-02 VITALS
WEIGHT: 123 LBS | HEART RATE: 63 BPM | TEMPERATURE: 97.8 F | SYSTOLIC BLOOD PRESSURE: 114 MMHG | HEIGHT: 59 IN | RESPIRATION RATE: 16 BRPM | DIASTOLIC BLOOD PRESSURE: 60 MMHG | OXYGEN SATURATION: 97 % | BODY MASS INDEX: 24.8 KG/M2

## 2022-02-02 DIAGNOSIS — I10 ESSENTIAL HYPERTENSION: ICD-10-CM

## 2022-02-02 DIAGNOSIS — N18.32 STAGE 3B CHRONIC KIDNEY DISEASE: ICD-10-CM

## 2022-02-02 PROCEDURE — 99214 OFFICE O/P EST MOD 30 MIN: CPT | Performed by: NURSE PRACTITIONER

## 2022-02-02 RX ORDER — CALCIPOTRIENE 50 UG/G
OINTMENT TOPICAL
COMMUNITY
Start: 2022-01-12 | End: 2022-08-06

## 2022-02-02 RX ORDER — FLUOROURACIL 50 MG/G
CREAM TOPICAL
COMMUNITY
Start: 2021-12-07 | End: 2022-08-06

## 2022-02-02 ASSESSMENT — FIBROSIS 4 INDEX: FIB4 SCORE: 2.05

## 2022-02-02 ASSESSMENT — PATIENT HEALTH QUESTIONNAIRE - PHQ9: CLINICAL INTERPRETATION OF PHQ2 SCORE: 0

## 2022-02-02 NOTE — ASSESSMENT & PLAN NOTE
She has an appointment next week with Dr. Hernandez. She does not take any NSAID's. She will take Tylenol as needed. She had recent labs completed.    Results for NEHEMIAH RAMSEY (MRN [id number]) as of 2/2/2022 10:05   Ref. Range 1/19/2022 09:15   GFR If  Latest Ref Range: >60 mL/min/1.73 m 2 33 (A)   GFR If Non  Latest Ref Range: >60 mL/min/1.73 m 2 28 (A)

## 2022-02-02 NOTE — ASSESSMENT & PLAN NOTE
Her blood pressure today is at goal.  She continues with her amlodipine 5 mg daily and losartan 50 mg daily. When she changes position quickly she gets occasional dizziness. The dizziness improves when she slows her movements.

## 2022-02-02 NOTE — PROGRESS NOTES
Subjective:     CC: hypertension    HPI:   Nehemiah presents today with the following:     Essential hypertension  Her blood pressure today is at goal.  She continues with her amlodipine 5 mg daily and losartan 50 mg daily. When she changes position quickly she gets occasional dizziness. The dizziness improves when she slows her movements.    Stage 3b chronic kidney disease (HCC)  She has an appointment next week with Dr. Hernandez. She does not take any NSAID's. She will take Tylenol as needed. She had recent labs completed.    Results for NEHEMIAH RAMSEY (MRN [id number]) as of 2/2/2022 10:05   Ref. Range 1/19/2022 09:15   GFR If  Latest Ref Range: >60 mL/min/1.73 m 2 33 (A)   GFR If Non  Latest Ref Range: >60 mL/min/1.73 m 2 28 (A)       Past Medical History:   Diagnosis Date   • GERD (gastroesophageal reflux disease)    • Hyperparathyroidism (HCC)    • Hypertension        Social History     Tobacco Use   • Smoking status: Never Smoker   • Smokeless tobacco: Never Used   Vaping Use   • Vaping Use: Never used   Substance Use Topics   • Alcohol use: No     Comment: holidays   • Drug use: No       Current Outpatient Medications Ordered in Epic   Medication Sig Dispense Refill   • amLODIPine (NORVASC) 5 MG Tab TAKE 1 TABLET BY MOUTH  DAILY 90 Tablet 3   • famotidine (PEPCID) 40 MG Tab TAKE 1 TABLET BY MOUTH  DAILY 90 Tablet 3   • losartan (COZAAR) 50 MG Tab TAKE 1 TABLET BY MOUTH  DAILY 90 Tablet 3   • atorvastatin (LIPITOR) 20 MG Tab TAKE 1 TABLET BY MOUTH  DAILY 90 Tablet 3   • B Complex-C (B COMPLEX-VITAMIN C PO) Take  by mouth.     • SENSIPAR 60 MG Tab Take 1 Tab by mouth every Monday, Wednesday, and Friday.  7   • Cholecalciferol (VITAMIN D-3) 5000 units Tab Take  by mouth every Monday, Wednesday, and Friday.     • calcipotriene (DOVONOX) 0.005 % Ointment      • fluorouracil (EFUDEX) 5 % cream        No current Epic-ordered facility-administered medications on file.  "      Allergies:  Patient has no known allergies.    Health Maintenance: She states that she had her second dose of her zoster vaccine completed on 11/2/2021 at [facility], we have not received records.  She states that she will check her paperwork at home to see if she has any records and bring to her next appointment.    Objective:     Vital signs reviewed  Exam:  /60 (BP Location: Right arm, Patient Position: Sitting, BP Cuff Size: Adult)   Pulse 63   Temp 36.6 °C (97.8 °F) (Temporal)   Resp 16   Ht 1.499 m (4' 11\")   Wt 55.8 kg (123 lb)   SpO2 97%   BMI 24.84 kg/m²  Body mass index is 24.84 kg/m².    Gen: Alert and oriented, No apparent distress.  Neck: Neck is supple without lymphadenopathy.  Lungs: Normal effort, CTA bilaterally, no wheezes, rhonchi, or rales  CV: Regular rate and rhythm. No murmurs, rubs, or gallops.  Bilateral radial pulses 2+.  Ext: No clubbing, cyanosis, edema.      Assessment & Plan:     89 y.o. female with the following -     1. Essential hypertension  Chronic stable problem.  Her blood pressure is at goal today.  She will continue with her amlodipine 5 mg daily and losartan 50 mg daily.  Encouraged her to slow down and to change positions slowly to minimize positional dizziness.  She verbalized understanding.  Reviewed her recent lab results.  Recommend that she continue follow-up with her nephrologist next week.    2. Stage 3b chronic kidney disease (HCC)  Chronic stable problem.  She had recent labs completed with plans to follow-up with her nephrologist next week.  I agree with this plan.  She will continue to avoid NSAIDs and her blood pressure is at goal today.      Return in about 5 months (around 7/2/2022) for Hypertension.    Please note that this dictation was created using voice recognition software. I have made every reasonable attempt to correct obvious errors, but I expect that there are errors of grammar and possibly content that I did not discover before finalizing " the note.

## 2022-07-05 ENCOUNTER — HOSPITAL ENCOUNTER (OUTPATIENT)
Dept: LAB | Facility: MEDICAL CENTER | Age: 87
End: 2022-07-05
Attending: INTERNAL MEDICINE
Payer: MEDICARE

## 2022-07-05 LAB
25(OH)D3 SERPL-MCNC: 93 NG/ML (ref 30–100)
ALBUMIN SERPL BCP-MCNC: 4.4 G/DL (ref 3.2–4.9)
BUN SERPL-MCNC: 20 MG/DL (ref 8–22)
CALCIUM SERPL-MCNC: 10.4 MG/DL (ref 8.5–10.5)
CHLORIDE SERPL-SCNC: 100 MMOL/L (ref 96–112)
CO2 SERPL-SCNC: 25 MMOL/L (ref 20–33)
CREAT SERPL-MCNC: 1.82 MG/DL (ref 0.5–1.4)
GFR SERPLBLD CREATININE-BSD FMLA CKD-EPI: 26 ML/MIN/1.73 M 2
GLUCOSE SERPL-MCNC: 104 MG/DL (ref 65–99)
PHOSPHATE SERPL-MCNC: 3.8 MG/DL (ref 2.5–4.5)
POTASSIUM SERPL-SCNC: 4.1 MMOL/L (ref 3.6–5.5)
PTH-INTACT SERPL-MCNC: 49.9 PG/ML (ref 14–72)
SODIUM SERPL-SCNC: 137 MMOL/L (ref 135–145)

## 2022-07-05 PROCEDURE — 80069 RENAL FUNCTION PANEL: CPT

## 2022-07-05 PROCEDURE — 82306 VITAMIN D 25 HYDROXY: CPT

## 2022-07-05 PROCEDURE — 83970 ASSAY OF PARATHORMONE: CPT

## 2022-07-05 PROCEDURE — 36415 COLL VENOUS BLD VENIPUNCTURE: CPT

## 2022-07-25 ENCOUNTER — OFFICE VISIT (OUTPATIENT)
Dept: MEDICAL GROUP | Facility: PHYSICIAN GROUP | Age: 87
End: 2022-07-25
Payer: MEDICARE

## 2022-07-25 VITALS
WEIGHT: 119 LBS | DIASTOLIC BLOOD PRESSURE: 50 MMHG | HEART RATE: 81 BPM | BODY MASS INDEX: 23.36 KG/M2 | OXYGEN SATURATION: 99 % | RESPIRATION RATE: 16 BRPM | HEIGHT: 60 IN | SYSTOLIC BLOOD PRESSURE: 110 MMHG | TEMPERATURE: 97.7 F

## 2022-07-25 DIAGNOSIS — T14.8XXA BLISTER: ICD-10-CM

## 2022-07-25 DIAGNOSIS — I10 ESSENTIAL HYPERTENSION: ICD-10-CM

## 2022-07-25 PROBLEM — N30.00 ACUTE CYSTITIS WITHOUT HEMATURIA: Status: RESOLVED | Noted: 2021-04-28 | Resolved: 2022-07-25

## 2022-07-25 PROCEDURE — 99214 OFFICE O/P EST MOD 30 MIN: CPT | Performed by: NURSE PRACTITIONER

## 2022-07-25 RX ORDER — AMLODIPINE BESYLATE 2.5 MG/1
2.5 TABLET ORAL DAILY
Qty: 30 TABLET | Refills: 2 | Status: ON HOLD | OUTPATIENT
Start: 2022-07-25 | End: 2022-08-09

## 2022-07-25 ASSESSMENT — FIBROSIS 4 INDEX: FIB4 SCORE: 2.07

## 2022-07-25 NOTE — PROGRESS NOTES
"Subjective:     CC: hypertension follow-up    HPI:   Delphine presents today with the following:      Essential hypertension  Her blood pressure is at goal today.  She continues with amlodipine 5 mg daily and losartan 25 mg daily. She was told by her nephrologist to decrease the losartan dose about 4 months ago. She has been changing positions slowly but she continues to have occasional dizziness. She does have a blood pressure machine at home. She had recent nephrology appointment and she reports that her blood pressure was \"low\".     Blister  She reports that about 2 weeks ago she noticed a sore and a wound to her left second toe.  She states that the sore did have clear fluid and when the sore did open clear fluid came out.  The area is painful to touch.  No home interventions tried.  Denies any fever, chills, streaking redness, warmth, or trauma to the area.          Past Medical History:   Diagnosis Date   • GERD (gastroesophageal reflux disease)    • Hyperparathyroidism (HCC)    • Hypertension        Social History     Tobacco Use   • Smoking status: Never Smoker   • Smokeless tobacco: Never Used   Vaping Use   • Vaping Use: Never used   Substance Use Topics   • Alcohol use: No     Comment: holidays   • Drug use: No       Current Outpatient Medications Ordered in Epic   Medication Sig Dispense Refill   • amLODIPine (NORVASC) 2.5 MG Tab Take 1 Tablet by mouth every day. 30 Tablet 2   • calcipotriene (DOVONOX) 0.005 % Ointment      • fluorouracil (EFUDEX) 5 % cream      • famotidine (PEPCID) 40 MG Tab TAKE 1 TABLET BY MOUTH  DAILY 90 Tablet 3   • losartan (COZAAR) 50 MG Tab TAKE 1 TABLET BY MOUTH  DAILY (Patient taking differently: 25 mg. TAKE 1 TABLET BY MOUTH  DAILY) 90 Tablet 3   • atorvastatin (LIPITOR) 20 MG Tab TAKE 1 TABLET BY MOUTH  DAILY 90 Tablet 3   • B Complex-C (B COMPLEX-VITAMIN C PO) Take  by mouth.     • SENSIPAR 60 MG Tab Take 1 Tab by mouth every Monday, Wednesday, and Friday.  7   • " Cholecalciferol (VITAMIN D-3) 5000 units Tab Take  by mouth every Monday, Wednesday, and Friday.       No current Epic-ordered facility-administered medications on file.       Allergies:  Patient has no known allergies.    Health Maintenance: Reviewed      Objective:     Vital signs reviewed  Exam:  /50 (BP Location: Left arm, Patient Position: Sitting, BP Cuff Size: Adult)   Pulse 81   Temp 36.5 °C (97.7 °F) (Temporal)   Resp 16   Ht 1.524 m (5')   Wt 54 kg (119 lb)   SpO2 99%   BMI 23.24 kg/m²  Body mass index is 23.24 kg/m².    Gen: Alert and oriented, No apparent distress.  Lungs: Normal effort, CTA bilaterally, no wheezes, rhonchi, or rales  CV: Regular rate and rhythm with systolic murmur. No rubs or gallops.  Ext: No clubbing, cyanosis, edema.  Left pedal pulse 1+.  Left second toe with large corn present and 2 anterior side of corn there is a small red area without any warmth, redness or discharge.        Assessment & Plan:     89 y.o. female with the following -     1. Essential hypertension  Chronic exacerbated problem.  We will try and decrease her blood pressure medication dosages to see this helps improve her dizziness.  Continue changing positions slowly.  She will continue with her losartan 25 mg daily.  We are decreasing her amlodipine to 2.5 mg daily.  She will start home blood pressure monitoring and blood pressure log provided today.  Return in 3 weeks for follow-up.  - amLODIPine (NORVASC) 2.5 MG Tab; Take 1 Tablet by mouth every day.  Dispense: 30 Tablet; Refill: 2    2. Blister  Acute uncomplicated problem.  Recommended keeping the area clean dry and intact.  Recommend placing a Band-Aid to the area to help cover.  Area does not appear to be infectious today we will hold off on antibiotics.  She is returning in 3 weeks for blood pressure follow-up we will follow-up at that appointment.        Return in about 3 weeks (around 8/15/2022) for Hypertension.    Please note that this  dictation was created using voice recognition software. I have made every reasonable attempt to correct obvious errors, but I expect that there are errors of grammar and possibly content that I did not discover before finalizing the note.

## 2022-07-25 NOTE — ASSESSMENT & PLAN NOTE
She reports that about 2 weeks ago she noticed a sore and a wound to her left second toe.  She states that the sore did have clear fluid and when the sore did open clear fluid came out.  The area is painful to touch.  No home interventions tried.  Denies any fever, chills, streaking redness, warmth, or trauma to the area.

## 2022-07-25 NOTE — ASSESSMENT & PLAN NOTE
"Her blood pressure is at goal today.  She continues with amlodipine 5 mg daily and losartan 25 mg daily. She was told by her nephrologist to decrease the losartan dose about 4 months ago. She has been changing positions slowly but she continues to have occasional dizziness. She does have a blood pressure machine at home. She had recent nephrology appointment and she reports that her blood pressure was \"low\".   "

## 2022-08-04 ENCOUNTER — OFFICE VISIT (OUTPATIENT)
Dept: MEDICAL GROUP | Facility: PHYSICIAN GROUP | Age: 87
End: 2022-08-04
Payer: MEDICARE

## 2022-08-04 VITALS
BODY MASS INDEX: 24.15 KG/M2 | WEIGHT: 123 LBS | HEART RATE: 82 BPM | SYSTOLIC BLOOD PRESSURE: 108 MMHG | DIASTOLIC BLOOD PRESSURE: 52 MMHG | OXYGEN SATURATION: 97 % | HEIGHT: 60 IN | TEMPERATURE: 98.6 F

## 2022-08-04 DIAGNOSIS — I10 ESSENTIAL HYPERTENSION: ICD-10-CM

## 2022-08-04 PROCEDURE — 99213 OFFICE O/P EST LOW 20 MIN: CPT | Performed by: NURSE PRACTITIONER

## 2022-08-04 ASSESSMENT — FIBROSIS 4 INDEX: FIB4 SCORE: 2.07

## 2022-08-04 NOTE — PROGRESS NOTES
Subjective:     CC: Hypertension follow-up    HPI:   Delphine presents today with the following:    Essential hypertension  She continues with amlodipine 2.5 mg daily and losartan 25 mg daily. Her initial blood pressure with us is 128/64.  She brings her home blood pressure cuff and her home blood pressure cuff reading was 121/59.  She has been checking her blood pressure at home and brings in her blood pressure log today.  She states that she had family recently with her and they are concerned about her elevated blood pressures.  Her home blood pressure log has been showing systolics in the 150-160's.  She did not have any chest pain, shortness of breath, dizziness, blurry vision or headache with these elevated blood pressures.  Her dizziness has improved with the decreased amlodipine dose.  She states that several days ago she did walk into a door that she did not see and does have bruising to the left eye.  It is not causing any vision changes or pain.  She states that she was not dizzy or lightheaded when she walked into the door.      Past Medical History:   Diagnosis Date   • GERD (gastroesophageal reflux disease)    • Hyperparathyroidism (HCC)    • Hypertension        Social History     Tobacco Use   • Smoking status: Never Smoker   • Smokeless tobacco: Never Used   Vaping Use   • Vaping Use: Never used   Substance Use Topics   • Alcohol use: No     Comment: holidays   • Drug use: No       Current Outpatient Medications Ordered in Epic   Medication Sig Dispense Refill   • amLODIPine (NORVASC) 2.5 MG Tab Take 1 Tablet by mouth every day. 30 Tablet 2   • famotidine (PEPCID) 40 MG Tab TAKE 1 TABLET BY MOUTH  DAILY 90 Tablet 3   • losartan (COZAAR) 50 MG Tab TAKE 1 TABLET BY MOUTH  DAILY (Patient taking differently: 25 mg. TAKE 1 TABLET BY MOUTH  DAILY) 90 Tablet 3   • atorvastatin (LIPITOR) 20 MG Tab TAKE 1 TABLET BY MOUTH  DAILY 90 Tablet 3   • B Complex-C (B COMPLEX-VITAMIN C PO) Take  by mouth.     • SENSIPAR 60  MG Tab Take 1 Tab by mouth every Monday, Wednesday, and Friday.  7   • Cholecalciferol (VITAMIN D-3) 5000 units Tab Take  by mouth every Monday, Wednesday, and Friday.     • calcipotriene (DOVONOX) 0.005 % Ointment  (Patient not taking: Reported on 8/4/2022)     • fluorouracil (EFUDEX) 5 % cream  (Patient not taking: Reported on 8/4/2022)       No current [facility]-ordered facility-administered medications on file.       Allergies:  Patient has no known allergies.      Objective:     Vital signs reviewed  Exam:  /52 (BP Location: Left arm, Patient Position: Sitting)   Pulse 82   Temp 37 °C (98.6 °F) (Temporal)   Ht 1.524 m (5')   Wt 55.8 kg (123 lb)   SpO2 97%   BMI 24.02 kg/m²  Body mass index is 24.02 kg/m².    Gen: Alert and oriented, No apparent distress.  Eyes:   Left eye orbit with bruising, no pain on palpation.  Lungs: Normal effort, CTA bilaterally, no wheezes, rhonchi, or rales  CV: Regular rate and rhythm with systolic murmur. No rubs, or gallops.  Ext: No clubbing, cyanosis, edema.        Assessment & Plan:     89 y.o. female with the following -     1. Essential hypertension  Chronic stable problem.  On repeat by me today her blood pressures were at goal.  She reports that her dizziness has resolved since her decreased dose of amlodipine.  Her home blood pressure cuff calibrated with our in office reading.  We discussed taking her losartan 25 mg in the morning and starting to take her amlodipine 2.5 mg in the evening.  Discussed tomorrow taking amlodipine into the afternoon and the following day to take her amlodipine in the evening.  She will return in 2 weeks for follow-up.  Continue home blood pressure monitoring.        Return in about 2 weeks (around 8/18/2022) for Hypertension.    Please note that this dictation was created using voice recognition software. I have made every reasonable attempt to correct obvious errors, but I expect that there are errors of grammar and possibly content that  I did not discover before finalizing the note.

## 2022-08-04 NOTE — ASSESSMENT & PLAN NOTE
She continues with amlodipine 2.5 mg daily and losartan 25 mg daily. Her initial blood pressure with us is 128/64.  She brings her home blood pressure cuff and her home blood pressure cuff reading was 121/59.  She has been checking her blood pressure at home and brings in her blood pressure log today.  She states that she had family recently with her and they are concerned about her elevated blood pressures.  Her home blood pressure log has been showing systolics in the 150-160's.  She did not have any chest pain, shortness of breath, dizziness, blurry vision or headache with these elevated blood pressures.  Her dizziness has improved with the decreased amlodipine dose.  She states that several days ago she did walk into a door that she did not see and does have bruising to the left eye.  It is not causing any vision changes or pain.  She states that she was not dizzy or lightheaded when she walked into the door.

## 2022-08-06 ENCOUNTER — APPOINTMENT (OUTPATIENT)
Dept: RADIOLOGY | Facility: MEDICAL CENTER | Age: 87
DRG: 251 | End: 2022-08-06
Attending: EMERGENCY MEDICINE
Payer: MEDICARE

## 2022-08-06 ENCOUNTER — APPOINTMENT (OUTPATIENT)
Dept: CARDIOLOGY | Facility: MEDICAL CENTER | Age: 87
DRG: 251 | End: 2022-08-06
Attending: EMERGENCY MEDICINE
Payer: MEDICARE

## 2022-08-06 ENCOUNTER — HOSPITAL ENCOUNTER (INPATIENT)
Facility: MEDICAL CENTER | Age: 87
LOS: 3 days | DRG: 251 | End: 2022-08-09
Attending: EMERGENCY MEDICINE | Admitting: INTERNAL MEDICINE
Payer: MEDICARE

## 2022-08-06 DIAGNOSIS — K21.9 GASTROESOPHAGEAL REFLUX DISEASE WITHOUT ESOPHAGITIS: ICD-10-CM

## 2022-08-06 DIAGNOSIS — I10 ESSENTIAL HYPERTENSION: ICD-10-CM

## 2022-08-06 DIAGNOSIS — D69.6 THROMBOCYTOPENIA (HCC): ICD-10-CM

## 2022-08-06 DIAGNOSIS — R55 SYNCOPE, UNSPECIFIED SYNCOPE TYPE: ICD-10-CM

## 2022-08-06 DIAGNOSIS — R53.81 DEBILITY: ICD-10-CM

## 2022-08-06 DIAGNOSIS — I21.02 ST ELEVATION MYOCARDIAL INFARCTION INVOLVING LEFT ANTERIOR DESCENDING (LAD) CORONARY ARTERY (HCC): ICD-10-CM

## 2022-08-06 DIAGNOSIS — N18.32 STAGE 3B CHRONIC KIDNEY DISEASE: ICD-10-CM

## 2022-08-06 DIAGNOSIS — I48.0 PAROXYSMAL A-FIB (HCC): ICD-10-CM

## 2022-08-06 PROBLEM — I21.3 STEMI (ST ELEVATION MYOCARDIAL INFARCTION) (HCC): Status: ACTIVE | Noted: 2022-08-06

## 2022-08-06 PROBLEM — D64.89 OTHER SPECIFIED ANEMIAS: Status: ACTIVE | Noted: 2022-08-06

## 2022-08-06 PROBLEM — I48.20 CHRONIC ATRIAL FIBRILLATION (HCC): Status: ACTIVE | Noted: 2022-08-06

## 2022-08-06 LAB
ACT BLD: 190 SEC (ref 74–137)
ACT BLD: 225 SEC (ref 74–137)
ALBUMIN SERPL BCP-MCNC: 3.6 G/DL (ref 3.2–4.9)
ALBUMIN/GLOB SERPL: 1.8 G/DL
ALP SERPL-CCNC: 48 U/L (ref 30–99)
ALT SERPL-CCNC: 20 U/L (ref 2–50)
ANION GAP SERPL CALC-SCNC: 20 MMOL/L (ref 7–16)
APTT PPP: 33.1 SEC (ref 24.7–36)
APTT PPP: >240 SEC (ref 24.7–36)
AST SERPL-CCNC: 30 U/L (ref 12–45)
BASOPHILS # BLD AUTO: 0.1 % (ref 0–1.8)
BASOPHILS # BLD: 0.01 K/UL (ref 0–0.12)
BILIRUB SERPL-MCNC: 0.4 MG/DL (ref 0.1–1.5)
BUN SERPL-MCNC: 30 MG/DL (ref 8–22)
CALCIUM SERPL-MCNC: 8.7 MG/DL (ref 8.5–10.5)
CHLORIDE SERPL-SCNC: 105 MMOL/L (ref 96–112)
CO2 SERPL-SCNC: 15 MMOL/L (ref 20–33)
CREAT SERPL-MCNC: 2.23 MG/DL (ref 0.5–1.4)
EKG IMPRESSION: NORMAL
EOSINOPHIL # BLD AUTO: 0.01 K/UL (ref 0–0.51)
EOSINOPHIL NFR BLD: 0.1 % (ref 0–6.9)
ERYTHROCYTE [DISTWIDTH] IN BLOOD BY AUTOMATED COUNT: 53 FL (ref 35.9–50)
GFR SERPLBLD CREATININE-BSD FMLA CKD-EPI: 20 ML/MIN/1.73 M 2
GLOBULIN SER CALC-MCNC: 2 G/DL (ref 1.9–3.5)
GLUCOSE SERPL-MCNC: 141 MG/DL (ref 65–99)
HCT VFR BLD AUTO: 25.7 % (ref 37–47)
HGB BLD-MCNC: 8.1 G/DL (ref 12–16)
IMM GRANULOCYTES # BLD AUTO: 0.05 K/UL (ref 0–0.11)
IMM GRANULOCYTES NFR BLD AUTO: 0.6 % (ref 0–0.9)
INR PPP: 1.24 (ref 0.87–1.13)
INR PPP: 1.57 (ref 0.87–1.13)
LIPASE SERPL-CCNC: 27 U/L (ref 11–82)
LYMPHOCYTES # BLD AUTO: 1.32 K/UL (ref 1–4.8)
LYMPHOCYTES NFR BLD: 16.4 % (ref 22–41)
MAGNESIUM SERPL-MCNC: 1.6 MG/DL (ref 1.5–2.5)
MCH RBC QN AUTO: 30.2 PG (ref 27–33)
MCHC RBC AUTO-ENTMCNC: 31.5 G/DL (ref 33.6–35)
MCV RBC AUTO: 95.9 FL (ref 81.4–97.8)
MONOCYTES # BLD AUTO: 0.93 K/UL (ref 0–0.85)
MONOCYTES NFR BLD AUTO: 11.6 % (ref 0–13.4)
NEUTROPHILS # BLD AUTO: 5.73 K/UL (ref 2–7.15)
NEUTROPHILS NFR BLD: 71.2 % (ref 44–72)
NRBC # BLD AUTO: 0 K/UL
NRBC BLD-RTO: 0 /100 WBC
NT-PROBNP SERPL IA-MCNC: 4512 PG/ML (ref 0–125)
NT-PROBNP SERPL IA-MCNC: 5681 PG/ML (ref 0–125)
PLATELET # BLD AUTO: 136 K/UL (ref 164–446)
PMV BLD AUTO: 12.4 FL (ref 9–12.9)
POTASSIUM SERPL-SCNC: 4 MMOL/L (ref 3.6–5.5)
PROT SERPL-MCNC: 5.6 G/DL (ref 6–8.2)
PROTHROMBIN TIME: 15.4 SEC (ref 12–14.6)
PROTHROMBIN TIME: 18.5 SEC (ref 12–14.6)
RBC # BLD AUTO: 2.68 M/UL (ref 4.2–5.4)
SODIUM SERPL-SCNC: 140 MMOL/L (ref 135–145)
TROPONIN T SERPL-MCNC: 1482 NG/L (ref 6–19)
TROPONIN T SERPL-MCNC: 1694 NG/L (ref 6–19)
TSH SERPL DL<=0.005 MIU/L-ACNC: 2.8 UIU/ML (ref 0.38–5.33)
UFH PPP CHRO-ACNC: >1.1 IU/ML
WBC # BLD AUTO: 8.1 K/UL (ref 4.8–10.8)

## 2022-08-06 PROCEDURE — 700111 HCHG RX REV CODE 636 W/ 250 OVERRIDE (IP): Performed by: INTERNAL MEDICINE

## 2022-08-06 PROCEDURE — 83880 ASSAY OF NATRIURETIC PEPTIDE: CPT

## 2022-08-06 PROCEDURE — 02C03ZZ EXTIRPATION OF MATTER FROM CORONARY ARTERY, ONE ARTERY, PERCUTANEOUS APPROACH: ICD-10-PCS | Performed by: INTERNAL MEDICINE

## 2022-08-06 PROCEDURE — 92941 PRQ TRLML REVSC TOT OCCL AMI: CPT | Mod: LD | Performed by: INTERNAL MEDICINE

## 2022-08-06 PROCEDURE — 80076 HEPATIC FUNCTION PANEL: CPT

## 2022-08-06 PROCEDURE — 93005 ELECTROCARDIOGRAM TRACING: CPT | Performed by: EMERGENCY MEDICINE

## 2022-08-06 PROCEDURE — 71045 X-RAY EXAM CHEST 1 VIEW: CPT

## 2022-08-06 PROCEDURE — 99152 MOD SED SAME PHYS/QHP 5/>YRS: CPT | Performed by: INTERNAL MEDICINE

## 2022-08-06 PROCEDURE — 85520 HEPARIN ASSAY: CPT

## 2022-08-06 PROCEDURE — 700111 HCHG RX REV CODE 636 W/ 250 OVERRIDE (IP)

## 2022-08-06 PROCEDURE — C1894 INTRO/SHEATH, NON-LASER: HCPCS

## 2022-08-06 PROCEDURE — 84443 ASSAY THYROID STIM HORMONE: CPT

## 2022-08-06 PROCEDURE — 84484 ASSAY OF TROPONIN QUANT: CPT | Mod: 91

## 2022-08-06 PROCEDURE — 80053 COMPREHEN METABOLIC PANEL: CPT

## 2022-08-06 PROCEDURE — 4A023N7 MEASUREMENT OF CARDIAC SAMPLING AND PRESSURE, LEFT HEART, PERCUTANEOUS APPROACH: ICD-10-PCS | Performed by: INTERNAL MEDICINE

## 2022-08-06 PROCEDURE — B2111ZZ FLUOROSCOPY OF MULTIPLE CORONARY ARTERIES USING LOW OSMOLAR CONTRAST: ICD-10-PCS | Performed by: INTERNAL MEDICINE

## 2022-08-06 PROCEDURE — 700102 HCHG RX REV CODE 250 W/ 637 OVERRIDE(OP)

## 2022-08-06 PROCEDURE — A9270 NON-COVERED ITEM OR SERVICE: HCPCS | Performed by: INTERNAL MEDICINE

## 2022-08-06 PROCEDURE — 770022 HCHG ROOM/CARE - ICU (200)

## 2022-08-06 PROCEDURE — 99291 CRITICAL CARE FIRST HOUR: CPT | Performed by: INTERNAL MEDICINE

## 2022-08-06 PROCEDURE — 85347 COAGULATION TIME ACTIVATED: CPT

## 2022-08-06 PROCEDURE — 700105 HCHG RX REV CODE 258: Performed by: INTERNAL MEDICINE

## 2022-08-06 PROCEDURE — 93005 ELECTROCARDIOGRAM TRACING: CPT | Performed by: INTERNAL MEDICINE

## 2022-08-06 PROCEDURE — 02703ZZ DILATION OF CORONARY ARTERY, ONE ARTERY, PERCUTANEOUS APPROACH: ICD-10-PCS | Performed by: INTERNAL MEDICINE

## 2022-08-06 PROCEDURE — 85730 THROMBOPLASTIN TIME PARTIAL: CPT | Mod: 91

## 2022-08-06 PROCEDURE — 85025 COMPLETE CBC W/AUTO DIFF WBC: CPT

## 2022-08-06 PROCEDURE — 700117 HCHG RX CONTRAST REV CODE 255: Performed by: INTERNAL MEDICINE

## 2022-08-06 PROCEDURE — 700102 HCHG RX REV CODE 250 W/ 637 OVERRIDE(OP): Performed by: INTERNAL MEDICINE

## 2022-08-06 PROCEDURE — 85610 PROTHROMBIN TIME: CPT

## 2022-08-06 PROCEDURE — 83735 ASSAY OF MAGNESIUM: CPT

## 2022-08-06 PROCEDURE — 99291 CRITICAL CARE FIRST HOUR: CPT

## 2022-08-06 PROCEDURE — 83690 ASSAY OF LIPASE: CPT

## 2022-08-06 PROCEDURE — 70450 CT HEAD/BRAIN W/O DYE: CPT | Mod: ME

## 2022-08-06 PROCEDURE — 99223 1ST HOSP IP/OBS HIGH 75: CPT | Mod: 25 | Performed by: INTERNAL MEDICINE

## 2022-08-06 PROCEDURE — 36415 COLL VENOUS BLD VENIPUNCTURE: CPT

## 2022-08-06 PROCEDURE — A9270 NON-COVERED ITEM OR SERVICE: HCPCS

## 2022-08-06 RX ORDER — HEPARIN SODIUM 1000 [USP'U]/ML
INJECTION, SOLUTION INTRAVENOUS; SUBCUTANEOUS
Status: COMPLETED
Start: 2022-08-06 | End: 2022-08-06

## 2022-08-06 RX ORDER — CLOPIDOGREL 300 MG/1
600 TABLET, FILM COATED ORAL ONCE
Status: DISCONTINUED | OUTPATIENT
Start: 2022-08-06 | End: 2022-08-06

## 2022-08-06 RX ORDER — FAMOTIDINE 20 MG/1
10 TABLET, FILM COATED ORAL DAILY
Status: DISCONTINUED | OUTPATIENT
Start: 2022-08-07 | End: 2022-08-08

## 2022-08-06 RX ORDER — LIDOCAINE HYDROCHLORIDE 10 MG/ML
INJECTION, SOLUTION EPIDURAL; INFILTRATION; INTRACAUDAL; PERINEURAL
Status: COMPLETED
Start: 2022-08-06 | End: 2022-08-06

## 2022-08-06 RX ORDER — HEPARIN SODIUM 200 [USP'U]/100ML
INJECTION, SOLUTION INTRAVENOUS
Status: COMPLETED
Start: 2022-08-06 | End: 2022-08-06

## 2022-08-06 RX ORDER — HEPARIN SODIUM 5000 [USP'U]/100ML
0-30 INJECTION, SOLUTION INTRAVENOUS CONTINUOUS
Status: DISCONTINUED | OUTPATIENT
Start: 2022-08-06 | End: 2022-08-07

## 2022-08-06 RX ORDER — NITROGLYCERIN 0.4 MG/1
0.4 TABLET SUBLINGUAL
Status: DISCONTINUED | OUTPATIENT
Start: 2022-08-06 | End: 2022-08-09 | Stop reason: HOSPADM

## 2022-08-06 RX ORDER — ATORVASTATIN CALCIUM 80 MG/1
80 TABLET, FILM COATED ORAL EVERY EVENING
Status: DISCONTINUED | OUTPATIENT
Start: 2022-08-06 | End: 2022-08-06

## 2022-08-06 RX ORDER — CLOPIDOGREL BISULFATE 75 MG/1
75 TABLET ORAL DAILY
Status: DISCONTINUED | OUTPATIENT
Start: 2022-08-07 | End: 2022-08-06

## 2022-08-06 RX ORDER — HEPARIN SODIUM 5000 [USP'U]/100ML
0-30 INJECTION, SOLUTION INTRAVENOUS CONTINUOUS
Status: DISCONTINUED | OUTPATIENT
Start: 2022-08-06 | End: 2022-08-06

## 2022-08-06 RX ORDER — ACETAMINOPHEN 325 MG/1
650 TABLET ORAL EVERY 6 HOURS PRN
Status: DISCONTINUED | OUTPATIENT
Start: 2022-08-06 | End: 2022-08-09 | Stop reason: HOSPADM

## 2022-08-06 RX ORDER — VERAPAMIL HYDROCHLORIDE 2.5 MG/ML
INJECTION, SOLUTION INTRAVENOUS
Status: COMPLETED
Start: 2022-08-06 | End: 2022-08-06

## 2022-08-06 RX ORDER — BISACODYL 10 MG
10 SUPPOSITORY, RECTAL RECTAL
Status: DISCONTINUED | OUTPATIENT
Start: 2022-08-06 | End: 2022-08-09 | Stop reason: HOSPADM

## 2022-08-06 RX ORDER — ATORVASTATIN CALCIUM 40 MG/1
40 TABLET, FILM COATED ORAL
Status: DISCONTINUED | OUTPATIENT
Start: 2022-08-06 | End: 2022-08-09 | Stop reason: HOSPADM

## 2022-08-06 RX ORDER — PHENYLEPHRINE HCL IN 0.9% NACL 0.5 MG/5ML
SYRINGE (ML) INTRAVENOUS
Status: COMPLETED
Start: 2022-08-06 | End: 2022-08-06

## 2022-08-06 RX ORDER — MIDAZOLAM HYDROCHLORIDE 1 MG/ML
INJECTION INTRAMUSCULAR; INTRAVENOUS
Status: COMPLETED
Start: 2022-08-06 | End: 2022-08-06

## 2022-08-06 RX ORDER — CLOPIDOGREL BISULFATE 75 MG/1
75 TABLET ORAL DAILY
Status: DISCONTINUED | OUTPATIENT
Start: 2022-08-07 | End: 2022-08-09 | Stop reason: HOSPADM

## 2022-08-06 RX ORDER — SODIUM CHLORIDE 9 MG/ML
INJECTION, SOLUTION INTRAVENOUS CONTINUOUS
Status: DISCONTINUED | OUTPATIENT
Start: 2022-08-06 | End: 2022-08-08

## 2022-08-06 RX ORDER — CLOPIDOGREL 300 MG/1
TABLET, FILM COATED ORAL
Status: COMPLETED
Start: 2022-08-06 | End: 2022-08-06

## 2022-08-06 RX ORDER — AMOXICILLIN 250 MG
2 CAPSULE ORAL 2 TIMES DAILY
Status: DISCONTINUED | OUTPATIENT
Start: 2022-08-06 | End: 2022-08-09 | Stop reason: HOSPADM

## 2022-08-06 RX ORDER — POLYETHYLENE GLYCOL 3350 17 G/17G
1 POWDER, FOR SOLUTION ORAL
Status: DISCONTINUED | OUTPATIENT
Start: 2022-08-06 | End: 2022-08-09 | Stop reason: HOSPADM

## 2022-08-06 RX ADMIN — MIDAZOLAM HYDROCHLORIDE 1 MG: 1 INJECTION, SOLUTION INTRAMUSCULAR; INTRAVENOUS at 21:11

## 2022-08-06 RX ADMIN — IOHEXOL 68 ML: 350 INJECTION, SOLUTION INTRAVENOUS at 21:10

## 2022-08-06 RX ADMIN — HEPARIN SODIUM: 1000 INJECTION, SOLUTION INTRAVENOUS; SUBCUTANEOUS at 21:30

## 2022-08-06 RX ADMIN — LIDOCAINE HYDROCHLORIDE 5 ML: 10 INJECTION, SOLUTION EPIDURAL; INFILTRATION; INTRACAUDAL; PERINEURAL at 20:46

## 2022-08-06 RX ADMIN — HEPARIN SODIUM: 1000 INJECTION, SOLUTION INTRAVENOUS; SUBCUTANEOUS at 20:46

## 2022-08-06 RX ADMIN — HEPARIN SODIUM 12 UNITS/KG/HR: 5000 INJECTION, SOLUTION INTRAVENOUS at 22:37

## 2022-08-06 RX ADMIN — SODIUM CHLORIDE: 9 INJECTION, SOLUTION INTRAVENOUS at 22:30

## 2022-08-06 RX ADMIN — NITROGLYCERIN 10 ML: 20 INJECTION INTRAVENOUS at 20:47

## 2022-08-06 RX ADMIN — ATORVASTATIN CALCIUM 40 MG: 40 TABLET, FILM COATED ORAL at 22:26

## 2022-08-06 RX ADMIN — FENTANYL CITRATE 25 MCG: 50 INJECTION, SOLUTION INTRAMUSCULAR; INTRAVENOUS at 21:11

## 2022-08-06 RX ADMIN — METOPROLOL TARTRATE 12.5 MG: 25 TABLET, FILM COATED ORAL at 22:27

## 2022-08-06 RX ADMIN — HEPARIN SODIUM 2000 UNITS: 200 INJECTION, SOLUTION INTRAVENOUS at 20:47

## 2022-08-06 RX ADMIN — Medication 100 MCG: at 21:11

## 2022-08-06 RX ADMIN — SENNOSIDES AND DOCUSATE SODIUM 2 TABLET: 50; 8.6 TABLET ORAL at 22:27

## 2022-08-06 RX ADMIN — CLOPIDOGREL BISULFATE 600 MG: 300 TABLET, FILM COATED ORAL at 21:12

## 2022-08-06 ASSESSMENT — ENCOUNTER SYMPTOMS
DIZZINESS: 1
COUGH: 0
WHEEZING: 0
MEMORY LOSS: 0
EYE REDNESS: 0
FEVER: 0
VOMITING: 0
WEIGHT LOSS: 0
WEAKNESS: 1
CHILLS: 0
SHORTNESS OF BREATH: 0
HEADACHES: 0
PALPITATIONS: 0
SORE THROAT: 0
EYE DISCHARGE: 0
NAUSEA: 0
MYALGIAS: 0
BRUISES/BLEEDS EASILY: 0

## 2022-08-06 ASSESSMENT — FIBROSIS 4 INDEX: FIB4 SCORE: 2.07

## 2022-08-07 ENCOUNTER — APPOINTMENT (OUTPATIENT)
Dept: CARDIOLOGY | Facility: MEDICAL CENTER | Age: 87
DRG: 251 | End: 2022-08-07
Attending: INTERNAL MEDICINE
Payer: MEDICARE

## 2022-08-07 LAB
ABO + RH BLD: NORMAL
ABO GROUP BLD: NORMAL
ALBUMIN SERPL BCP-MCNC: 3 G/DL (ref 3.2–4.9)
ALBUMIN SERPL BCP-MCNC: 3.1 G/DL (ref 3.2–4.9)
ALBUMIN/GLOB SERPL: 1.9 G/DL
ALP SERPL-CCNC: 41 U/L (ref 30–99)
ALP SERPL-CCNC: 43 U/L (ref 30–99)
ALT SERPL-CCNC: 18 U/L (ref 2–50)
ALT SERPL-CCNC: 20 U/L (ref 2–50)
ANION GAP SERPL CALC-SCNC: 13 MMOL/L (ref 7–16)
ANION GAP SERPL CALC-SCNC: 14 MMOL/L (ref 7–16)
AST SERPL-CCNC: 27 U/L (ref 12–45)
AST SERPL-CCNC: 29 U/L (ref 12–45)
BARCODED ABORH UBTYP: 5100
BARCODED PRD CODE UBPRD: NORMAL
BARCODED UNIT NUM UBUNT: NORMAL
BASOPHILS # BLD AUTO: 0.1 % (ref 0–1.8)
BASOPHILS # BLD: 0.01 K/UL (ref 0–0.12)
BILIRUB CONJ SERPL-MCNC: <0.2 MG/DL (ref 0.1–0.5)
BILIRUB INDIRECT SERPL-MCNC: ABNORMAL MG/DL (ref 0–1)
BILIRUB SERPL-MCNC: 0.3 MG/DL (ref 0.1–1.5)
BILIRUB SERPL-MCNC: 0.3 MG/DL (ref 0.1–1.5)
BLD GP AB SCN SERPL QL: NORMAL
BUN SERPL-MCNC: 29 MG/DL (ref 8–22)
BUN SERPL-MCNC: 30 MG/DL (ref 8–22)
CALCIUM SERPL-MCNC: 7.9 MG/DL (ref 8.5–10.5)
CALCIUM SERPL-MCNC: 8 MG/DL (ref 8.5–10.5)
CHLORIDE SERPL-SCNC: 108 MMOL/L (ref 96–112)
CHLORIDE SERPL-SCNC: 109 MMOL/L (ref 96–112)
CHOLEST SERPL-MCNC: 94 MG/DL (ref 100–199)
CO2 SERPL-SCNC: 18 MMOL/L (ref 20–33)
CO2 SERPL-SCNC: 19 MMOL/L (ref 20–33)
COMPONENT R 8504R: NORMAL
CREAT SERPL-MCNC: 1.89 MG/DL (ref 0.5–1.4)
CREAT SERPL-MCNC: 1.93 MG/DL (ref 0.5–1.4)
EKG IMPRESSION: NORMAL
EOSINOPHIL # BLD AUTO: 0 K/UL (ref 0–0.51)
EOSINOPHIL NFR BLD: 0 % (ref 0–6.9)
ERYTHROCYTE [DISTWIDTH] IN BLOOD BY AUTOMATED COUNT: 53.3 FL (ref 35.9–50)
ERYTHROCYTE [DISTWIDTH] IN BLOOD BY AUTOMATED COUNT: 53.7 FL (ref 35.9–50)
ERYTHROCYTE [DISTWIDTH] IN BLOOD BY AUTOMATED COUNT: 53.8 FL (ref 35.9–50)
ERYTHROCYTE [DISTWIDTH] IN BLOOD BY AUTOMATED COUNT: 54.9 FL (ref 35.9–50)
FERRITIN SERPL-MCNC: 80 NG/ML (ref 10–291)
GFR SERPLBLD CREATININE-BSD FMLA CKD-EPI: 24 ML/MIN/1.73 M 2
GFR SERPLBLD CREATININE-BSD FMLA CKD-EPI: 25 ML/MIN/1.73 M 2
GLOBULIN SER CALC-MCNC: 1.6 G/DL (ref 1.9–3.5)
GLUCOSE SERPL-MCNC: 105 MG/DL (ref 65–99)
GLUCOSE SERPL-MCNC: 89 MG/DL (ref 65–99)
HCT VFR BLD AUTO: 22.3 % (ref 37–47)
HCT VFR BLD AUTO: 22.7 % (ref 37–47)
HCT VFR BLD AUTO: 25.1 % (ref 37–47)
HCT VFR BLD AUTO: 28.9 % (ref 37–47)
HDLC SERPL-MCNC: 43 MG/DL
HEMOCCULT STL QL: POSITIVE
HGB BLD-MCNC: 7.2 G/DL (ref 12–16)
HGB BLD-MCNC: 7.4 G/DL (ref 12–16)
HGB BLD-MCNC: 8.2 G/DL (ref 12–16)
HGB BLD-MCNC: 9.4 G/DL (ref 12–16)
HGB RETIC QN AUTO: 29.4 PG/CELL (ref 29–35)
IMM GRANULOCYTES # BLD AUTO: 0.05 K/UL (ref 0–0.11)
IMM GRANULOCYTES NFR BLD AUTO: 0.7 % (ref 0–0.9)
IMM RETICS NFR: 27.3 % (ref 9.3–17.4)
IRON SATN MFR SERPL: 16 % (ref 15–55)
IRON SERPL-MCNC: 30 UG/DL (ref 40–170)
LDLC SERPL CALC-MCNC: 43 MG/DL
LV EJECT FRACT  99904: 55
LV EJECT FRACT MOD 2C 99903: 40.74
LV EJECT FRACT MOD 4C 99902: 52.73
LV EJECT FRACT MOD BP 99901: 54.93
LYMPHOCYTES # BLD AUTO: 1.64 K/UL (ref 1–4.8)
LYMPHOCYTES NFR BLD: 24.2 % (ref 22–41)
MAGNESIUM SERPL-MCNC: 1.6 MG/DL (ref 1.5–2.5)
MCH RBC QN AUTO: 30.6 PG (ref 27–33)
MCH RBC QN AUTO: 30.7 PG (ref 27–33)
MCH RBC QN AUTO: 30.8 PG (ref 27–33)
MCH RBC QN AUTO: 30.8 PG (ref 27–33)
MCHC RBC AUTO-ENTMCNC: 32.3 G/DL (ref 33.6–35)
MCHC RBC AUTO-ENTMCNC: 32.5 G/DL (ref 33.6–35)
MCHC RBC AUTO-ENTMCNC: 32.6 G/DL (ref 33.6–35)
MCHC RBC AUTO-ENTMCNC: 32.7 G/DL (ref 33.6–35)
MCV RBC AUTO: 94 FL (ref 81.4–97.8)
MCV RBC AUTO: 94.1 FL (ref 81.4–97.8)
MCV RBC AUTO: 94.6 FL (ref 81.4–97.8)
MCV RBC AUTO: 95.3 FL (ref 81.4–97.8)
MONOCYTES # BLD AUTO: 0.76 K/UL (ref 0–0.85)
MONOCYTES NFR BLD AUTO: 11.2 % (ref 0–13.4)
NEUTROPHILS # BLD AUTO: 4.33 K/UL (ref 2–7.15)
NEUTROPHILS NFR BLD: 63.8 % (ref 44–72)
NRBC # BLD AUTO: 0 K/UL
NRBC BLD-RTO: 0 /100 WBC
PLATELET # BLD AUTO: 108 K/UL (ref 164–446)
PLATELET # BLD AUTO: 114 K/UL (ref 164–446)
PLATELET # BLD AUTO: 117 K/UL (ref 164–446)
PLATELET # BLD AUTO: 121 K/UL (ref 164–446)
PMV BLD AUTO: 12.1 FL (ref 9–12.9)
PMV BLD AUTO: 12.3 FL (ref 9–12.9)
PMV BLD AUTO: 12.5 FL (ref 9–12.9)
PMV BLD AUTO: 12.5 FL (ref 9–12.9)
POTASSIUM SERPL-SCNC: 3.9 MMOL/L (ref 3.6–5.5)
POTASSIUM SERPL-SCNC: 4.1 MMOL/L (ref 3.6–5.5)
PRODUCT TYPE UPROD: NORMAL
PROT SERPL-MCNC: 4.7 G/DL (ref 6–8.2)
PROT SERPL-MCNC: 5.1 G/DL (ref 6–8.2)
RBC # BLD AUTO: 2.34 M/UL (ref 4.2–5.4)
RBC # BLD AUTO: 2.4 M/UL (ref 4.2–5.4)
RBC # BLD AUTO: 2.67 M/UL (ref 4.2–5.4)
RBC # BLD AUTO: 3.07 M/UL (ref 4.2–5.4)
RETICS # AUTO: 0.06 M/UL (ref 0.04–0.06)
RETICS/RBC NFR: 2.6 % (ref 0.8–2.1)
RH BLD: NORMAL
SODIUM SERPL-SCNC: 140 MMOL/L (ref 135–145)
SODIUM SERPL-SCNC: 141 MMOL/L (ref 135–145)
TIBC SERPL-MCNC: 186 UG/DL (ref 250–450)
TRANSFERRIN SERPL-MCNC: 161 MG/DL (ref 200–370)
TRIGL SERPL-MCNC: 41 MG/DL (ref 0–149)
TROPONIN T SERPL-MCNC: 1498 NG/L (ref 6–19)
UFH PPP CHRO-ACNC: 0.8 IU/ML
UIBC SERPL-MCNC: 156 UG/DL (ref 110–370)
UNIT STATUS USTAT: NORMAL
WBC # BLD AUTO: 6.5 K/UL (ref 4.8–10.8)
WBC # BLD AUTO: 6.7 K/UL (ref 4.8–10.8)
WBC # BLD AUTO: 6.8 K/UL (ref 4.8–10.8)
WBC # BLD AUTO: 7.9 K/UL (ref 4.8–10.8)

## 2022-08-07 PROCEDURE — 99233 SBSQ HOSP IP/OBS HIGH 50: CPT | Performed by: INTERNAL MEDICINE

## 2022-08-07 PROCEDURE — 700111 HCHG RX REV CODE 636 W/ 250 OVERRIDE (IP): Performed by: INTERNAL MEDICINE

## 2022-08-07 PROCEDURE — 80048 BASIC METABOLIC PNL TOTAL CA: CPT

## 2022-08-07 PROCEDURE — 700105 HCHG RX REV CODE 258: Performed by: STUDENT IN AN ORGANIZED HEALTH CARE EDUCATION/TRAINING PROGRAM

## 2022-08-07 PROCEDURE — 93306 TTE W/DOPPLER COMPLETE: CPT | Mod: 26 | Performed by: INTERNAL MEDICINE

## 2022-08-07 PROCEDURE — 700102 HCHG RX REV CODE 250 W/ 637 OVERRIDE(OP): Performed by: INTERNAL MEDICINE

## 2022-08-07 PROCEDURE — 30233N1 TRANSFUSION OF NONAUTOLOGOUS RED BLOOD CELLS INTO PERIPHERAL VEIN, PERCUTANEOUS APPROACH: ICD-10-PCS | Performed by: INTERNAL MEDICINE

## 2022-08-07 PROCEDURE — 93010 ELECTROCARDIOGRAM REPORT: CPT | Mod: 76 | Performed by: INTERNAL MEDICINE

## 2022-08-07 PROCEDURE — 85520 HEPARIN ASSAY: CPT

## 2022-08-07 PROCEDURE — 770022 HCHG ROOM/CARE - ICU (200)

## 2022-08-07 PROCEDURE — 86900 BLOOD TYPING SEROLOGIC ABO: CPT

## 2022-08-07 PROCEDURE — 93306 TTE W/DOPPLER COMPLETE: CPT

## 2022-08-07 PROCEDURE — 93005 ELECTROCARDIOGRAM TRACING: CPT | Performed by: INTERNAL MEDICINE

## 2022-08-07 PROCEDURE — 36415 COLL VENOUS BLD VENIPUNCTURE: CPT

## 2022-08-07 PROCEDURE — 83550 IRON BINDING TEST: CPT

## 2022-08-07 PROCEDURE — A9270 NON-COVERED ITEM OR SERVICE: HCPCS | Performed by: INTERNAL MEDICINE

## 2022-08-07 PROCEDURE — 93010 ELECTROCARDIOGRAM REPORT: CPT | Performed by: INTERNAL MEDICINE

## 2022-08-07 PROCEDURE — 86901 BLOOD TYPING SEROLOGIC RH(D): CPT

## 2022-08-07 PROCEDURE — 84484 ASSAY OF TROPONIN QUANT: CPT

## 2022-08-07 PROCEDURE — 86923 COMPATIBILITY TEST ELECTRIC: CPT

## 2022-08-07 PROCEDURE — 700105 HCHG RX REV CODE 258: Performed by: INTERNAL MEDICINE

## 2022-08-07 PROCEDURE — 700117 HCHG RX CONTRAST REV CODE 255: Performed by: INTERNAL MEDICINE

## 2022-08-07 PROCEDURE — 83540 ASSAY OF IRON: CPT

## 2022-08-07 PROCEDURE — 86850 RBC ANTIBODY SCREEN: CPT

## 2022-08-07 PROCEDURE — 85025 COMPLETE CBC W/AUTO DIFF WBC: CPT

## 2022-08-07 PROCEDURE — 85027 COMPLETE CBC AUTOMATED: CPT

## 2022-08-07 PROCEDURE — 82728 ASSAY OF FERRITIN: CPT

## 2022-08-07 PROCEDURE — 84466 ASSAY OF TRANSFERRIN: CPT

## 2022-08-07 PROCEDURE — 85046 RETICYTE/HGB CONCENTRATE: CPT

## 2022-08-07 PROCEDURE — 82272 OCCULT BLD FECES 1-3 TESTS: CPT

## 2022-08-07 PROCEDURE — 80061 LIPID PANEL: CPT

## 2022-08-07 PROCEDURE — P9016 RBC LEUKOCYTES REDUCED: HCPCS

## 2022-08-07 PROCEDURE — 99291 CRITICAL CARE FIRST HOUR: CPT | Performed by: INTERNAL MEDICINE

## 2022-08-07 PROCEDURE — 36430 TRANSFUSION BLD/BLD COMPNT: CPT

## 2022-08-07 PROCEDURE — 80053 COMPREHEN METABOLIC PANEL: CPT

## 2022-08-07 PROCEDURE — 83735 ASSAY OF MAGNESIUM: CPT

## 2022-08-07 RX ORDER — SODIUM CHLORIDE 9 MG/ML
500 INJECTION, SOLUTION INTRAVENOUS ONCE
Status: COMPLETED | OUTPATIENT
Start: 2022-08-07 | End: 2022-08-07

## 2022-08-07 RX ORDER — MAGNESIUM SULFATE HEPTAHYDRATE 40 MG/ML
4 INJECTION, SOLUTION INTRAVENOUS ONCE
Status: COMPLETED | OUTPATIENT
Start: 2022-08-07 | End: 2022-08-07

## 2022-08-07 RX ADMIN — SENNOSIDES AND DOCUSATE SODIUM 2 TABLET: 50; 8.6 TABLET ORAL at 06:29

## 2022-08-07 RX ADMIN — MAGNESIUM SULFATE HEPTAHYDRATE 4 G: 40 INJECTION, SOLUTION INTRAVENOUS at 08:37

## 2022-08-07 RX ADMIN — FAMOTIDINE 10 MG: 20 TABLET, FILM COATED ORAL at 06:29

## 2022-08-07 RX ADMIN — HUMAN ALBUMIN MICROSPHERES AND PERFLUTREN 3 ML: 10; .22 INJECTION, SOLUTION INTRAVENOUS at 09:00

## 2022-08-07 RX ADMIN — CLOPIDOGREL BISULFATE 75 MG: 75 TABLET ORAL at 06:33

## 2022-08-07 RX ADMIN — METOPROLOL TARTRATE 12.5 MG: 25 TABLET, FILM COATED ORAL at 06:31

## 2022-08-07 RX ADMIN — SODIUM CHLORIDE: 9 INJECTION, SOLUTION INTRAVENOUS at 21:50

## 2022-08-07 RX ADMIN — ATORVASTATIN CALCIUM 40 MG: 40 TABLET, FILM COATED ORAL at 20:33

## 2022-08-07 RX ADMIN — SODIUM CHLORIDE 500 ML: 9 INJECTION, SOLUTION INTRAVENOUS at 12:30

## 2022-08-07 ASSESSMENT — LIFESTYLE VARIABLES
ON A TYPICAL DAY WHEN YOU DRINK ALCOHOL HOW MANY DRINKS DO YOU HAVE: 0
HOW MANY TIMES IN THE PAST YEAR HAVE YOU HAD 5 OR MORE DRINKS IN A DAY: 0
HAVE YOU EVER FELT YOU SHOULD CUT DOWN ON YOUR DRINKING: NO
ALCOHOL_USE: NO
TOTAL SCORE: 0
HAVE PEOPLE ANNOYED YOU BY CRITICIZING YOUR DRINKING: NO
EVER HAD A DRINK FIRST THING IN THE MORNING TO STEADY YOUR NERVES TO GET RID OF A HANGOVER: NO
CONSUMPTION TOTAL: NEGATIVE
AVERAGE NUMBER OF DAYS PER WEEK YOU HAVE A DRINK CONTAINING ALCOHOL: 0
TOTAL SCORE: 0
EVER FELT BAD OR GUILTY ABOUT YOUR DRINKING: NO
TOTAL SCORE: 0

## 2022-08-07 ASSESSMENT — PATIENT HEALTH QUESTIONNAIRE - PHQ9
2. FEELING DOWN, DEPRESSED, IRRITABLE, OR HOPELESS: NOT AT ALL
5. POOR APPETITE OR OVEREATING: SEVERAL DAYS
SUM OF ALL RESPONSES TO PHQ QUESTIONS 1-9: 7
3. TROUBLE FALLING OR STAYING ASLEEP OR SLEEPING TOO MUCH: MORE THAN HALF THE DAYS
SUM OF ALL RESPONSES TO PHQ9 QUESTIONS 1 AND 2: 3
7. TROUBLE CONCENTRATING ON THINGS, SUCH AS READING THE NEWSPAPER OR WATCHING TELEVISION: SEVERAL DAYS
9. THOUGHTS THAT YOU WOULD BE BETTER OFF DEAD, OR OF HURTING YOURSELF: NOT AT ALL
6. FEELING BAD ABOUT YOURSELF - OR THAT YOU ARE A FAILURE OR HAVE LET YOURSELF OR YOUR FAMILY DOWN: NOT AL ALL
1. LITTLE INTEREST OR PLEASURE IN DOING THINGS: NEARLY EVERY DAY
8. MOVING OR SPEAKING SO SLOWLY THAT OTHER PEOPLE COULD HAVE NOTICED. OR THE OPPOSITE, BEING SO FIGETY OR RESTLESS THAT YOU HAVE BEEN MOVING AROUND A LOT MORE THAN USUAL: NOT AT ALL
4. FEELING TIRED OR HAVING LITTLE ENERGY: NOT AT ALL

## 2022-08-07 ASSESSMENT — ENCOUNTER SYMPTOMS
FEVER: 0
MYALGIAS: 0
SHORTNESS OF BREATH: 0
COUGH: 0
WEIGHT LOSS: 0
DEPRESSION: 0
CONSTIPATION: 0
DIARRHEA: 0
WEAKNESS: 0
PALPITATIONS: 0
VOMITING: 0
CHILLS: 0
DIZZINESS: 0
NAUSEA: 0

## 2022-08-07 ASSESSMENT — FIBROSIS 4 INDEX
FIB4 SCORE: 4.54
FIB4 SCORE: 5.14

## 2022-08-07 ASSESSMENT — PAIN DESCRIPTION - PAIN TYPE: TYPE: ACUTE PAIN

## 2022-08-07 NOTE — PROGRESS NOTES
[facility] Progress Note      Admit Date: 8/6/2022    Resident(s): Kevin Davenport D.O.   Attending:  CECILE JACKSON/ Dr. Lemon     Patient ID:    Name:  Delphine Reeves   YOB: 1930  Age:  89 y.o.  female   MRN:  [id number]    Hospital Course (carried forward and updated):    89 y.o. female who presented 8/6/2022 with a new STEMI.    Patient has a PMH of HTN, GERD, hx of knee replacement, never smoker and came to the ED via EMS on 8/6/2022 after presenting 3 different falls with head trauma earlier on the same day concerning for syncopal events. She came as a code STEMI response and was given ASA, and nitro. EKG showed lateral STEMI but was reported that ST segment improved on nitro.    Her case was discussed with cardiology Dr Hodges who recommended LHC and PCI after conversation with the family of the patient.   At the ED she was found with hypotension that was intermitent and on A fib with RVR which surprisely was asymptomatic.  Noted that the patient is DNR/DNI.   Her initial workup included a negative head CT, elevated troponins into 1694 range.  The LHC showed a 100% blockage.    See interval events.    Consultants:  Interventional cardiology  Cardiology    Interval Events:    8/7: Pt w/o chest pain/ any sx. Heparin ggt 8/6-8/7- discontinued due to risk/ benefit evaluation. On optimized medication regimen including high intensity statin, PLAVIX, BB (titrate for rate control). Will need ace/arb eventually. Pending TTE results- prelim read with WNL EF.   Sedation: none  Pressors: none  Abx: none  Fluids: none    NEURO:   A&Ox4  CARDIOVASC:  VS stable  RESPIRATORY:  WNL  GI/NUTRITION:  N/A  RENAL/FLUID/LYTES: N/A  HEME/ONC:   On heparin ggt  INFECTIOUS D:  N/A  ENDOCRINE:   N/A    Vitals Range last 24h:  Temp:  [36.2 °C (97.1 °F)-36.7 °C (98.1 °F)] 36.5 °C (97.7 °F)  Pulse:  [] 111  Resp:  [15-47] 15  BP: ()/(42-83) 101/63  SpO2:  [89 %-96 %] 96 %      Intake/Output Summary (Last 24  hours) at 8/7/2022 0729  Last data filed at 8/7/2022 0600  Gross per 24 hour   Intake 341.81 ml   Output --   Net 341.81 ml      Review of Systems   Constitutional: Negative for chills, fever and weight loss.   Respiratory: Negative for cough and shortness of breath.    Cardiovascular: Negative for chest pain, palpitations and leg swelling.   Gastrointestinal: Negative for constipation, diarrhea, nausea and vomiting.   Genitourinary: Negative for dysuria.   Musculoskeletal: Negative for myalgias.   Neurological: Negative for dizziness and weakness.   Psychiatric/Behavioral: Negative for depression.       PHYSICAL EXAM:  Vitals:    08/07/22 0500 08/07/22 0600 08/07/22 0631 08/07/22 0700   BP: (!) 91/60 (!) 94/50 101/63    Pulse: (!) 119 (!) 109 (!) 111    Resp: (!) 33 15     Temp:    36.5 °C (97.7 °F)   TempSrc:    Temporal   SpO2: 89% 96%     Weight:        Body mass index is 24.63 kg/m².    O2 therapy: Pulse Oximetry: 96 %, O2 Delivery Device: None - Room Air    Physical Exam  Constitutional:       General: She is not in acute distress.     Appearance: Normal appearance. She is not ill-appearing or diaphoretic.   HENT:      Head: Normocephalic and atraumatic.      Mouth/Throat:      Mouth: Mucous membranes are moist.   Eyes:      Extraocular Movements: Extraocular movements intact.      Pupils: Pupils are equal, round, and reactive to light.   Cardiovascular:      Rate and Rhythm: Tachycardia present. Rhythm irregular.      Heart sounds: No murmur heard.    No friction rub. No gallop.   Pulmonary:      Effort: No respiratory distress.      Breath sounds: No stridor. No wheezing, rhonchi or rales.   Abdominal:      General: Bowel sounds are normal. There is no distension.      Tenderness: There is no abdominal tenderness. There is no guarding or rebound.   Musculoskeletal:      Right lower leg: No edema.      Left lower leg: No edema.   Skin:     Coloration: Skin is not jaundiced or pale.   Neurological:       General: No focal deficit present.      Mental Status: She is alert and oriented to person, place, and time.   Psychiatric:         Mood and Affect: Mood normal.         Behavior: Behavior normal.           Recent Labs     08/06/22 1917 08/06/22 2211 08/07/22 0124 08/07/22  0420   SODIUM 140  --  140 141   POTASSIUM 4.0  --  4.1 3.9   CHLORIDE 105  --  108 109   CO2 15*  --  18* 19*   BUN 30*  --  30* 29*   CREATININE 2.23*  --  1.93* 1.89*   MAGNESIUM  --  1.6  --  1.6   CALCIUM 8.7  --  8.0* 7.9*     Recent Labs     08/06/22 1917 08/06/22 2211 08/07/22 0124 08/07/22 0420   ALTSGPT 20 20  --  18   ASTSGOT 30 29  --  27   ALKPHOSPHAT 48 43  --  41   TBILIRUBIN 0.4 0.3  --  0.3   DBILIRUBIN  --  <0.2  --   --    LIPASE 27  --   --   --    GLUCOSE 141*  --  105* 89     Recent Labs     08/06/22 1917 08/06/22 2211 08/07/22 0124 08/07/22  0420   RBC 2.68*  --  2.40* 2.34*   HEMOGLOBIN 8.1*  --  7.4* 7.2*   HEMATOCRIT 25.7*  --  22.7* 22.3*   PLATELETCT 136*  --  117* 114*   PROTHROMBTM 15.4* 18.5*  --   --    APTT 33.1 >240.0*  --   --    INR 1.24* 1.57*  --   --      Recent Labs     08/06/22 1917 08/06/22 2211 08/07/22 0124 08/07/22 0420   WBC 8.1  --  7.9 6.8   NEUTSPOLYS 71.20  --   --  63.80   LYMPHOCYTES 16.40*  --   --  24.20   MONOCYTES 11.60  --   --  11.20   EOSINOPHILS 0.10  --   --  0.00   BASOPHILS 0.10  --   --  0.10   ASTSGOT 30 29  --  27   ALTSGPT 20 20  --  18   ALKPHOSPHAT 48 43  --  41   TBILIRUBIN 0.4 0.3  --  0.3       Meds:  • magnesium sulfate  4 g     • NS   50 mL/hr at 08/06/22 2230   • clopidogrel  75 mg     • atorvastatin  40 mg     • metoprolol tartrate  12.5 mg     • nitroglycerin  0.4 mg     • famotidine  10 mg     • senna-docusate  2 Tablet      And   • polyethylene glycol/lytes  1 Packet      And   • magnesium hydroxide  30 mL      And   • bisacodyl  10 mg     • acetaminophen  650 mg     • heparin  0-30 Units/kg/hr 7 Units/kg/hr (08/07/22 0708)      Procedures:  [facility]  8/6    Imaging:  CT-HEAD W/O   Final Result         1.  No evidence of acute intracranial hemorrhage or mass lesion.      2. Cerebral atrophy.               DX-CHEST-PORTABLE (1 VIEW)   Final Result         No pneumothorax identified.      DX-CHEST-LIMITED (1 VIEW)   Final Result         Lucency along the left chest wall likely represents skinfold versus pneumothorax. Recommend repeat chest x-ray.      This was discussed with Dr. Claire at 7:40 PM.      CL-LEFT HEART CATHETERIZATION WITH POSSIBLE INTERVENTION    (Results Pending)   EC-ECHOCARDIOGRAM COMPLETE W/O CONT    (Results Pending)       ASSESSEMENT and PLAN:    89 yo F with pertinent PMHx HTN, GERD, h/o falls presenting 8/6 for c/c passing out subsequently found with lateral ST elevation on EKG 8/6 taken emergently to [facility] 8/6 showing 100% LAD occlusion s/p unsuccessful attempt at distal LAD revascularization. Course c/b hypotension in ED that resolved s/p LHC. Now s/p heparin ggt 8/6-8/7 on optimized pharmacotherapy regimen (BB, high intensity statin, PLAVIX). Pending TTE. Titrate metoprolol tartrate for rate control and monitor HH to keep Hgb > 8. 1u pRBC this AM, iron studies prior.    #Lateral ST elevation myocardial infarction  #Coronary artery disease, 1 vessel disease  #Hypotension  #Hypertension, history of  #Dyslipidemia  -EKG w/ lateral STEMI in ED  -Lipid panel 1/19/22: Tchol 152, TG 86, HDL 60, LDL 75   -[facility] 8/6: severe 1v CAD (LAD w/ 100% occlusion) s/p unsuccessful attempt at distal LAD revascularization   -Hypotensive in ED  -s/p PLAVIX load 8/6  PLAN  -Medication optimization: BB (titrate as appropriate), plavix, high intensity statin  -Hold home losartan due to low BP- restart when appropriate  -TTE  -Cardiology on consult     #Atrial flutter, RVR  -Initially with low BP in ED, more stable BP s/p C  PLAN  -Metoprolol tartrate 12.5 mg PO q8 hrs   -Discontinue heparin ggt (8/6-8/7)  -Plavix 75 mg PO qD  -No aspirin given pt renal function  decreased from BL  -Hold home losartan for low BP  -TTE as above  -If hemodynamically unstable, consider BRISA/ CV  -Cardiology on consult    #Syncope  -Alert and oriented, speaking full sentences and protecting her airway 8/6  -3 falls w/ facial trauma   -CT head wo contrast 8/6 with no acute process  **DDx: sudden w/o prodromal sx- likely cardiogenic, possibly 2/2 underlying STEMI /A fib RVR  PLAN  -Neuro check q 4 hrs + telemetry  -Consider MRI brain if pt w/ any deterioration in mentation     #Acute kidney injury, likely pre renal, resolved  #Chronic kidney disease, stage 3b  -BL Scr 1.4-1.6  PLAN  -NS @ 50 cc/hr   -Avoid nephrotoxic agents, renally dose all medications  -CTM     #Bicytopenia  #Anemia, normocytic  #Thrombocytopenia  -BL Hgb 11-12   PLAN   -1u pRBC  -FOBT and monitor for acute bleed, type and screen q48 hrs   -Keep H/H >8 given ACS, consider transfusion if needed  -Iron panel/ ferritin/ transferrin prior to blood transfusion    -- CHRONIC AND RESOLVED HOSPITAL PROBLEMS --     #Gastroesophageal reflux disease without esophagitis- home famotidine      DISPO: stable     CODE STATUS: DNR/DNI     Quality Measures:  Feeding: NPO   Analgesia: none  Sedation: none  Thromboprophylaxis: PLAVIX   Head of bed: >30 degrees  Ulcer prophylaxis: famotidine   Glycemic control: none   Bowel care: bowel regimen prn   Indwelling lines: pIV x2  Deescalation of antibiotics: N/A    Kevin Davenport D.O.

## 2022-08-07 NOTE — WOUND TEAM
[facility]  Inpatient Services  Wound and Skin Care Brief Evaluation    Admission Date: 8/6/2022     Last order of IP CONSULT TO WOUND CARE was found on 8/7/2022 from Hospital Encounter on 8/6/2022     HPI, PMH, SH: Reviewed    No chief complaint on file.    Diagnosis: STEMI (ST elevation myocardial infarction) ([facility]) [I21.3]    Unit where seen by Wound Team: [id number]     Wound consult placed regarding Bilateral arm skin tears, Face bruising and sacrum. Chart and images reviewed. This discussed with bedside RN. This RN in to assess patient. Pt pleasant and agreeable. Pts face with bruising, Scattered skin tears with mepitel in use and order in place. Pt was then turned to the right side, sacrococcygeal area assessed, Area intact. Images obtained. No pressure injuries or advanced wound care needs identified. Wound consult completed. No further follow up unless indicated and consulted.

## 2022-08-07 NOTE — CARE PLAN
The patient is Watcher - Medium risk of patient condition declining or worsening    Shift Goals  Clinical Goals: Hemodynamic stability, peripheral pulse checks, cath site checks, start heparin gtt  Patient Goals: Rest, get well  Family Goals: TANIA    Progress made toward(s) clinical / shift goals:        Problem: Knowledge Deficit - Standard  Goal: Patient and family/care givers will demonstrate understanding of plan of care, disease process/condition, diagnostic tests and medications  Outcome: Progressing     Problem: Skin Integrity  Goal: Skin integrity is maintained or improved  Outcome: Progressing     Problem: Hemodynamics  Goal: Patient's hemodynamics, fluid balance and neurologic status will be stable or improve  Outcome: Progressing       Patient is not progressing towards the following goals:

## 2022-08-07 NOTE — ASSESSMENT & PLAN NOTE
Unclear source of anemia  Will check cbc in am  Keep H/H >8/24 given ACS, consider transfusion if needed  Monitor for any sources of bleeding  Noted that patient is on heparin ggt.

## 2022-08-07 NOTE — CARE PLAN
Problem: Knowledge Deficit - Standard  Goal: Patient and family/care givers will demonstrate understanding of plan of care, disease process/condition, diagnostic tests and medications  Outcome: Progressing  Note: Pt updated on POC, tests, and medications. Pt verbalizes understanding and has no further questions at this time. Pt educated on calling for any more questions.        Problem: Fall Risk  Goal: Patient will remain free from falls  Outcome: Progressing  Note: Pt remains free from falls at this time. Safety precautions in place. Pt educated on calling for assistance when needed.   Verified that safety precautions are in place and education provided to pt on fall safety and utilization of call button     The patient is Watcher - Medium risk of patient condition declining or worsening    Shift Goals  Clinical Goals: Hemodynamic stability, peripheral pulse checks, cath site checks, start heparin gtt  Patient Goals: Rest, get well  Family Goals: TANIA    Progress made toward(s) clinical / shift goals:  peripheral pulses present    Patient is not progressing towards the following goals:

## 2022-08-07 NOTE — DISCHARGE PLANNING
STEMI Response    Referral: Code STEMI Response    Intervention: SW responded to Code STEMI.  Pt was BIB [facility] for possible STEMI.  Pt was alert upon arrival.  Pts name is Delphine Reeves (: 30).  SW obtained the following pt information: Pt was reportedly at home with her Son and Dtr-in-law when she had a fall. EMS noted possible STEMI.  SW was informed by the Pt that her Son, Que and Dtr-in-law Vincent are on their way to [facility] now.     Son: Que Reeves [phone]  Dtr-in-law: Kathi Reeves [phone]    Plan: SW will continue to monitor and assist as needs arise.

## 2022-08-07 NOTE — ED PROVIDER NOTES
ED Provider Note    Scribed for Brandon Claire M.D. by Damian Russo. 8/6/2022  7:24 PM    Primary care provider: RAQUEL Vizcaino  Means of arrival: Ambulance  History obtained from: Patient and EMS  History limited by: None    CHIEF COMPLAINT  No chief complaint on file.  STEMI    HPI  Delphine Reeves is a 89 y.o. female who presents to the Emergency Department for evaluation of possible STEMI secondary to 3 falls today onset prior to arrival. She notes she hit her head today. She reports her son called EMS because of her 3 falls today and concern for syncope. EMS report she was found in A-fib and administered 324 mg ASA and Ntiro sublingually en route. She reports she has been experiencing increasing frequency of falls in the last several weeks without any warning. She denies loss of consciousness, lightheadedness, chest pain, chest pressure, pain elsewhere, nausea, vomiting, diarrhea, new rashes, or leg swelling. She denies any history of hyperlipidemia, A-Fib, MI, or stents placed. She denies any recent surgeries. She admits to history of hypertension and notes she does take daily medication.      REVIEW OF SYSTEMS  Pertinent positives include: frequent falls today, head trauma, and A-fib.   Pertinent negatives include:  loss of consciousness, lightheadedness, chest pain, pain elsewhere, nausea, vomiting, diarrhea, new rashes, or leg swelling.   See history of present illness. All other systems are negative.     PAST MEDICAL HISTORY   has a past medical history of GERD (gastroesophageal reflux disease), Hyperparathyroidism (HCC), and Hypertension.    SURGICAL HISTORY   has a past surgical history that includes knee replacement, total (Left).    SOCIAL HISTORY  Social History     Tobacco Use   • Smoking status: Never Smoker   • Smokeless tobacco: Never Used   Vaping Use   • Vaping Use: Never used   Substance Use Topics   • Alcohol use: No     Comment: holidays   • Drug use: No      Social  History     Substance and Sexual Activity   Drug Use No       FAMILY HISTORY  Family History   Problem Relation Age of Onset   • Heart Disease Mother    • Heart Disease Father    • Lung Disease Sister    • Heart Disease Brother    • No Known Problems Brother    • Cancer Neg Hx    • Diabetes Neg Hx        CURRENT MEDICATIONS  Current Outpatient Medications   Medication Instructions   • amLODIPine (NORVASC) 2.5 mg, Oral, DAILY   • atorvastatin (LIPITOR) 20 MG Tab TAKE 1 TABLET BY MOUTH  DAILY   • B Complex-C (B COMPLEX-VITAMIN C PO) Oral   • calcipotriene (DOVONOX) 0.005 % Ointment No dose, route, or frequency recorded.   • Cholecalciferol (VITAMIN D-3) 5000 units Tab Oral, EVERY MO, WE , FR   • famotidine (PEPCID) 40 MG Tab TAKE 1 TABLET BY MOUTH  DAILY   • fluorouracil (EFUDEX) 5 % cream No dose, route, or frequency recorded.   • losartan (COZAAR) 50 MG Tab TAKE 1 TABLET BY MOUTH  DAILY   • SENSIPAR 60 MG Tab 1 Tablet, Oral, EVERY MO, WE , FR       ALLERGIES  No Known Allergies    PHYSICAL EXAM  VITAL SIGNS: BP (!) 80/52   Pulse (!) 144   Resp 20   Wt 56 kg (123 lb 7.3 oz)   SpO2 94%   BMI 24.11 kg/m²     Constitutional: Alert in no apparent distress.  HENT: Bilateral external ears normal, Nose normal. Uvula midline. Left side periorbital ecchymosis and abrasions to forehead  Eyes: Pupils are equal and reactive, Conjunctiva normal, Non-icteric.   Neck: Normal range of motion, No tenderness, Supple, No stridor.   Lymphatic: No lymphadenopathy noted.   Cardiovascular: Tachycardic rate and regular rhythm, no murmurs.   Thorax & Lungs: Normal breath sounds, No respiratory distress, No wheezing, No chest tenderness.   Abdomen:  Soft, No tenderness, No peritoneal signs, No masses, No pulsatile masses.   Skin: Warm, Dry, No erythema, No rash.   Back: No bony tenderness, No CVA tenderness. No C/T/L spine step-offs or deformities, No C/T/L spine tenderness to palpation.  Extremities: 2+ peripheral pulses in bilateral  UE, No edema, No tenderness, No cyanosis.  Musculoskeletal: Good range of motion in all major joints. No tenderness to palpation or major deformities noted.   Neurologic: Alert , Normal motor function, Normal sensory function, No focal deficits noted. Cranial nerves II through XII intact.  5 out of 5 strength x4.  Sensation intact light touch.  Normal finger-nose-finger.  Normal reflexes bilaterally.  No clonus. EOMI. PERRL.     Psychiatric: Affect normal, Judgment normal, Mood normal.     DIAGNOSTIC STUDIES / PROCEDURES    LABS  Labs Reviewed   CBC WITH DIFFERENTIAL - Abnormal; Notable for the following components:       Result Value    RBC 2.68 (*)     Hemoglobin 8.1 (*)     Hematocrit 25.7 (*)     MCHC 31.5 (*)     RDW 53.0 (*)     Platelet Count 136 (*)     Lymphocytes 16.40 (*)     Monos (Absolute) 0.93 (*)     All other components within normal limits   COMP METABOLIC PANEL - Abnormal; Notable for the following components:    Co2 15 (*)     Anion Gap 20.0 (*)     Glucose 141 (*)     Bun 30 (*)     Creatinine 2.23 (*)     Total Protein 5.6 (*)     All other components within normal limits   TROPONIN - Abnormal; Notable for the following components:    Troponin T 1694 (*)     All other components within normal limits   PROTHROMBIN TIME - Abnormal; Notable for the following components:    PT 15.4 (*)     INR 1.24 (*)     All other components within normal limits    Narrative:     Indicate which anticoagulants the patient is on:->UNKNOWN   ESTIMATED GFR - Abnormal; Notable for the following components:    GFR (CKD-EPI) 20 (*)     All other components within normal limits   PROBRAIN NATRIURETIC PEPTIDE, NT - Abnormal; Notable for the following components:    NT-proBNP 5681 (*)     All other components within normal limits   POCT ACTIVATED CLOTTING TIME DEVICE RESULTS - Abnormal; Notable for the following components:    Istat Activated Clotting Time 190 (*)     All other components within normal limits   POCT  ACTIVATED CLOTTING TIME DEVICE RESULTS - Abnormal; Notable for the following components:    Istat Activated Clotting Time 225 (*)     All other components within normal limits   APTT    Narrative:     Indicate which anticoagulants the patient is on:->UNKNOWN   LIPASE   TROPONIN   TROPONIN   PROBRAIN NATRIURETIC PEPTIDE, NT   PROTHROMBIN TIME   APTT   APTT   PROTHROMBIN TIME   HEPARIN XA (UNFRACTIONATED)   MAGNESIUM   HEPARIN XA (UNFRACTIONATED)      All labs reviewed by me.    EKG   Report   Date Value Ref Range Status   2022       [facility] Cardiology    Test Date:  2022  Pt Name:    [facility]                 Department: ER  MRN:        [id number]                      Room:       [facility]  Gender:     Female                       Technician: JONATHAN  :        1930                   Requested By:VISHAL LIVINGSTON  Order #:    [id number]                    Reading MD:    Measurements  Intervals                                Axis  Rate:       135                          P:  TN:                                      QRS:        36  QRSD:       76                           T:          36  QT:         324  QTc:        486    Interpretive Statements  ATRIAL FIBRILLATION  BORDERLINE LOW VOLTAGE IN FRONTAL LEADS  ST ELEVATION SUGGESTS PERICARDITIS  BORDERLINE PROLONGED QT INTERVAL  Compared to ECG 2022 19:11:58  Sinus tachycardia no longer present  Myocardial infarct finding no longer present  ST (T wave) deviation still present     2022       [facility] Cardiology    Test Date:  2022  Pt Name:    [facility]                 Department: 161  MRN:        [id number]                      Room:       27  Gender:     Female                       Technician: JONATHAN  :        1930                   Requested By:ANNE CH  Order #:    [id number]                    Reading MD:    Measurements  Intervals                                Axis  Rate:       135                          P:  TN:                                       QRS:        36  QRSD:       76                           T:          36  QT:         324  QTc:        486    Interpretive Statements  ATRIAL FIBRILLATION  BORDERLINE LOW VOLTAGE IN FRONTAL LEADS  ST ELEVATION SUGGESTS PERICARDITIS  BORDERLINE PROLONGED QT INTERVAL  Compared to ECG 08/06/2022 19:11:58  Sinus tachycardia no longer present  Myocardial infarct finding no longer present  ST (T wave) deviation still present                   RADIOLOGY  CT-HEAD W/O   Final Result         1.  No evidence of acute intracranial hemorrhage or mass lesion.      2. Cerebral atrophy.               DX-CHEST-PORTABLE (1 VIEW)   Final Result         No pneumothorax identified.      DX-CHEST-LIMITED (1 VIEW)   Final Result         Lucency along the left chest wall likely represents skinfold versus pneumothorax. Recommend repeat chest x-ray.      This was discussed with Dr. Claire at 7:40 PM.      CL-LEFT HEART CATHETERIZATION WITH POSSIBLE INTERVENTION    (Results Pending)   EC-ECHOCARDIOGRAM COMPLETE W/O CONT    (Results Pending)     The radiologist's interpretation of all radiological studies have been reviewed by me.    COURSE & MEDICAL DECISION MAKING  Nursing notes, VS, PMSFHx reviewed in chart.    89 y.o. female p/w chief complaint of possible STEMI, multiple falls today and head trauma.    7:20 PM Patient seen and examined at trauma bay.      I verified that the patient was wearing a mask and I was wearing appropriate PPE every time I entered the room. The patient's mask was on the patient at all times during my encounter except for a brief view of the oropharynx.     The differential diagnoses include but are not limited to:     #STEMI  Upon arrival patient tachycardic and hypotensive yet smiling and states that she is pain-free    Patient appears to be in A. fib with RVR however EKG shows sinus tachycardia  Given patient DNR/DNI and talkative with no lightheadedness at this time no immediate  cardioversion performed    I discussed possible cardioversion but given STEMI and DNR/DNI I believe is in best interest for patient to have conversation with cardiologist in person within the next 15 minutes.  Throughout this time I did not leave patient's bedside and patient had pressures ranging from 60 systolic to 105 systolic and we were recycling pressures throughout this entire time and she received 1 L NS    Patient with bilateral IV access.  Patient prepped for potential Cath Lab.  Heparin held at this time due to concerns for potential brain injury    #Head Trauma  Given close head injury and [place] head CT rule positive, CT head ordered for patient with no evidence of ICH    Patient not anticoagulated  Patient has no neck or back pain    Patient has no abdominal tenderness palpation    7:24 PM I discussed the patient's case and the above findings with Dr. Hodges (Cardiology) who will come in to discuss.    7:29 PM Patient verbally declines CPR or intubation in the event of cardiac arrest. She states her son is aware of her DNR/DNI.  BP 63/42 and tachycardic rate in 140s.      7:40 PM BP 80/52. Discussed options with pt including cardioversion procedure vs medications.    7:43 PM Dr. Hodges (Cardiology) at bedside evaluating patient.         The total critical care time on this patient is 30 minutes, resuscitating patient, speaking with cardiologist, and deciphering test results. This 30 minutes is exclusive of separately billable procedures.    DISPOSITION:  Patient will be hospitalized by Dr. Hodges in critical condition.      FINAL IMPRESSION  1. ST elevation myocardial infarction involving left anterior descending (LAD) coronary artery (HCC)    2. Essential hypertension    3. Gastroesophageal reflux disease without esophagitis    4. Stage 3b chronic kidney disease (HCC)    5. Thrombocytopenia (HCC)    6.  Falls  7.  CHI     The total critical care time on this patient is 40 minutes     Damian BULLARD  (Scribe), am scribing for, and in the presence of, Brandon Claire M.D..    Electronically signed by: Damian Russo (Scribe), 8/6/2022    IBrandon M.D. personally performed the services described in this documentation, as scribed by Damian Russo in my presence, and it is both accurate and complete.    The note accurately reflects work and decisions made by me.  Brandon Claire M.D.  8/6/2022  9:53 PM

## 2022-08-07 NOTE — PROGRESS NOTES
Amadou from Lab called with critical result of PTT >240 and heparin >1.10 at 2325. Critical lab result read back to [facility].   Dr. Irizarry notified of critical lab result at 2328.  Critical lab result read back by Dr. Irizarry.

## 2022-08-07 NOTE — PROGRESS NOTES
4 Eyes Skin Assessment Completed by Shaila RN and SEAN Barney.    Head Scab, Bruising and Redness  Ears WDL  Nose Scab  Mouth WDL  Neck WDL  Breast/Chest WDL  Shoulder Blades WDL  Spine WDL  (R) Arm/Elbow/Hand Redness, Blanching, Bruising, Abrasion and Scab  (L) Arm/Elbow/Hand Redness, Blanching, Bruising, Abrasion and Scab  Abdomen WDL  Groin Incision, R cath lab access site  Scrotum/Coccyx/Buttocks Redness, blanching  (R) Leg WDL  (L) Leg WDL  (R) Heel/Foot/Toe WDL  (L) Heel/Foot/Toe WDL          Devices In Places ECG, Blood Pressure Cuff, Pulse Ox and SCD's      Interventions In Place Pillows, Q2 Turns and Pressure Redistribution Mattress    Possible Skin Injury Yes    Pictures Uploaded Into Epic Yes  Wound Consult Placed Yes  RN Wound Prevention Protocol Ordered Yes

## 2022-08-07 NOTE — PROGRESS NOTES
Notified Dr. Irizarry of patients BP 78/51. Per Dr. Irizarry, notify if patients SBP <65 or symptomatic. Orders to give metoprolol, with SBP <90.

## 2022-08-07 NOTE — CONSULTS
Cardiology Initial Consult Note    DOS: 8/6/2022    Referring physician:      Chief complaint/Reason for consult: Dr Claire    HPI: 89 y/o F with a h/o HTN. She was seen recently by [facility] for HTN management. She had a history of more falls recently but today had 3 separate falls with head trauma. EMS was called, and ECG in the field showed a lateral STEMI. She denies chest pain. In the ER her blood pressure is labile, with intermittent hypotension. She is also noted to be in atrial flutter with RVR. Also hypotensive with SBP 80 mmHg on my arrival.    I spoke at length with the patient regarding her wishes. I advised that proceeding with LHC and PCI carried elevated risk due to her advanced age, and that PCI would result in need for DAPT which could increase the risk of bleeding especially with her recent falls. However the benefit of PCI would potentially be significant symptom improvement if her symptoms are ischemic in nature. Her ST segments did improve with nitroglycerin. She ultimately asked that her son and daughter in law be involved in the decision making process.    I spoke with her son Que at length, reiterating all of the above. Though he notes she has previously expressed desire to be DNR/DNI he believes she would like to proceed unless there was a high chance of mortality or low quality of life following the procedure. I advised I don't believe that to be the case, thus we agreed to proceed to angiogram.    ROS (+ highlighted in bold):  Constitutional: Fevers/chills/fatigue/weightloss  HEENT: Blurry vision/eye pain/sore throat/hearing loss  Respiratory: Shortness of breath/cough  Cardiovascular: Chest pain/palpitations/edema/orthopnea/syncope  GI: Nausea/vomitting/diarrhea  MSK: Arthralgias/myagias/muscle weakness  Skin: Rash/sores  Neurological: Numbness/tremors/vertigo  Endocrine: Excessive thirst/polyuria/cold intolerance/heat intolerance  Psych: Depression/anxiety    Past Medical History:    Diagnosis Date   • GERD (gastroesophageal reflux disease)    • Hyperparathyroidism (HCC)    • Hypertension        Past Surgical History:   Procedure Laterality Date   • KNEE REPLACEMENT, TOTAL Left        Social History     Socioeconomic History   • Marital status:      Spouse name: Not on file   • Number of children: Not on file   • Years of education: Not on file   • Highest education level: Not on file   Occupational History   • Not on file   Tobacco Use   • Smoking status: Never Smoker   • Smokeless tobacco: Never Used   Vaping Use   • Vaping Use: Never used   Substance and Sexual Activity   • Alcohol use: No     Comment: holidays   • Drug use: No   • Sexual activity: Not Currently     Comment:  x 71 yrs, 3 sons, 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    Other Topics Concern   • Not on file   Social History Narrative   • Not on file     Social Determinants of Health     Financial Resource Strain: Not on file   Food Insecurity: Not on file   Transportation Needs: Not on file   Physical Activity: Not on file   Stress: Not on file   Social Connections: Not on file   Intimate Partner Violence: Not on file   Housing Stability: Not on file       Family History   Problem Relation Age of Onset   • Heart Disease Mother    • Heart Disease Father    • Lung Disease Sister    • Heart Disease Brother    • No Known Problems Brother    • Cancer Neg Hx    • Diabetes Neg Hx        No Known Allergies    Current Facility-Administered Medications   Medication Dose Route Frequency Provider Last Rate Last Admin   • NS infusion   Intravenous Continuous Brandon Claire M.D.       • VERAPAMIL HCL 2.5 MG/ML IV SOLN            • HEPARIN SODIUM (PORCINE) 1000 UNIT/ML INJ SOLN            • HEPARIN (PORCINE) IN NACL 2000-0.9 UNIT/L-% IV SOLN            • NITROGLYCERIN 2 MG IV SOLN            • LIDOCAINE HCL (PF) 1 % INJ SOLN              Current Outpatient Medications   Medication Sig Dispense Refill   • amLODIPine (NORVASC) 2.5 MG Tab Take 1  Tablet by mouth every day. 30 Tablet 2   • calcipotriene (DOVONOX) 0.005 % Ointment  (Patient not taking: Reported on 8/4/2022)     • fluorouracil (EFUDEX) 5 % cream  (Patient not taking: Reported on 8/4/2022)     • famotidine (PEPCID) 40 MG Tab TAKE 1 TABLET BY MOUTH  DAILY 90 Tablet 3   • losartan (COZAAR) 50 MG Tab TAKE 1 TABLET BY MOUTH  DAILY (Patient taking differently: 25 mg. TAKE 1 TABLET BY MOUTH  DAILY) 90 Tablet 3   • atorvastatin (LIPITOR) 20 MG Tab TAKE 1 TABLET BY MOUTH  DAILY 90 Tablet 3   • B Complex-C (B COMPLEX-VITAMIN C PO) Take  by mouth.     • SENSIPAR 60 MG Tab Take 1 Tab by mouth every Monday, Wednesday, and Friday.  7   • Cholecalciferol (VITAMIN D-3) 5000 units Tab Take  by mouth every Monday, Wednesday, and Friday.         Physical Exam:  Vitals:    08/06/22 1936 08/06/22 1938 08/06/22 1940 08/06/22 1945   BP: (!) 93/54 103/75 (!) 80/52 108/69   Pulse: (!) 146 (!) 144  (!) 135   Resp: 20      SpO2: 94%      Weight:         General appearance: NAD, conversant   Eyes: anicteric sclerae, moist conjunctivae; no lid-lag; PERRLA  HENT: Atraumatic; oropharynx clear with moist mucous membranes and no mucosal ulcerations; normal hard and soft palate  Neck: Trachea midline; FROM, supple, no thyromegaly or lymphadenopathy  Lungs: CTA, with normal respiratory effort and no intercostal retractions  CV: Tachycardic, irregular, no MRGs, no JVD   Abdomen: Soft, non-tender; no masses or HSM  Extremities: No peripheral edema or extremity lymphadenopathy  Skin: Normal temperature, turgor and texture; no rash, ulcers or subcutaneous nodules  Psych: Appropriate affect, alert and oriented to person, place and time    Data:  Lipids:   Lab Results   Component Value Date/Time    CHOLSTRLTOT 152 01/19/2022 09:15 AM    TRIGLYCERIDE 86 01/19/2022 09:15 AM    HDL 60 01/19/2022 09:15 AM    LDL 75 01/19/2022 09:15 AM        BMP:  Lab Results   Component Value Date/Time    SODIUM 137 07/05/2022 0843    POTASSIUM 4.1  07/05/2022 0843    CHLORIDE 100 07/05/2022 0843    CO2 25 07/05/2022 0843    GLUCOSE 104 (H) 07/05/2022 0843    BUN 20 07/05/2022 0843    CREATININE 1.82 (H) 07/05/2022 0843    CALCIUM 10.4 07/05/2022 0843    ANION 14.0 01/19/2022 0915        TSH:   Lab Results   Component Value Date/Time    TSHULTRASEN 0.960 01/04/2016 2035        THYROXINE (T4):   No results found for: FREEDIR     CBC:   Lab Results   Component Value Date/Time    WBC 8.1 08/06/2022 07:17 PM    RBC 2.68 (L) 08/06/2022 07:17 PM    HEMOGLOBIN 8.1 (L) 08/06/2022 07:17 PM    HEMATOCRIT 25.7 (L) 08/06/2022 07:17 PM    MCV 95.9 08/06/2022 07:17 PM    MCH 30.2 08/06/2022 07:17 PM    MCHC 31.5 (L) 08/06/2022 07:17 PM    RDW 53.0 (H) 08/06/2022 07:17 PM    PLATELETCT 136 (L) 08/06/2022 07:17 PM    MPV 12.4 08/06/2022 07:17 PM    NEUTSPOLYS 71.20 08/06/2022 07:17 PM    LYMPHOCYTES 16.40 (L) 08/06/2022 07:17 PM    MONOCYTES 11.60 08/06/2022 07:17 PM    EOSINOPHILS 0.10 08/06/2022 07:17 PM    BASOPHILS 0.10 08/06/2022 07:17 PM    IMMGRAN 0.60 08/06/2022 07:17 PM    NRBC 0.00 08/06/2022 07:17 PM    NEUTS 5.73 08/06/2022 07:17 PM    LYMPHS 1.32 08/06/2022 07:17 PM    MONOS 0.93 (H) 08/06/2022 07:17 PM    EOS 0.01 08/06/2022 07:17 PM    BASO 0.01 08/06/2022 07:17 PM    IMMGRANAB 0.05 08/06/2022 07:17 PM    NRBCAB 0.00 08/06/2022 07:17 PM        CBC w/o DIFF  Lab Results   Component Value Date/Time    WBC 8.1 08/06/2022 07:17 PM    RBC 2.68 (L) 08/06/2022 07:17 PM    HEMOGLOBIN 8.1 (L) 08/06/2022 07:17 PM    MCV 95.9 08/06/2022 07:17 PM    MCH 30.2 08/06/2022 07:17 PM    MCHC 31.5 (L) 08/06/2022 07:17 PM    RDW 53.0 (H) 08/06/2022 07:17 PM    MPV 12.4 08/06/2022 07:17 PM       Prior echo/stress results reviewed: EF 60%    EKG interpreted by me: AFL with RVR. Lateral ST elevations    Impression/Plan:  1. AF/AFL with RVR, acute and worsening, severe  2. STEMI, lateral. Acute and worsening, severe  3. HTN  4. Anemia  5. AoCKD  6. Syncope    - Ultimately a difficult  clinical decision, STEMI with atypical presentation and chest pain free but symptoms of syncope concerning for possible arrhythmic etiology cannot rule out PMVT/VF in setting of acute ischemia, in addition the hypotension is concerning for possible LV output/shock. However with advanced age, falls and anemia, the long term risks of DAPT are significant. In addition, risk of angiogram in setting of BEAU is also significant for worsening kidney disease. After weighing all risks and benefits, the pt and her family elect to proceed with angiogram and possible PCI. We will have a STAT Head CT first to rule out hemorrhage due to recent falls prior to proceeding to angiogram/PCI.  - Will need to address AFL with RVR, may convert with resolution of ischemia, but start oral beta blocker and give IVP 5mg metoprolol PRN HR .>130bpm, can start amiodarone gtt if needed (prefer rate control first however given unknown duration of AF/AFL and unanticoagulated state)  - May need BRISA/DCCV this admission as well  - Will need high intensity statin as well  - Check echo  - Likely admit to IMC vs CIC unit    Cardiology will follow      Choco Hodges MD  Cardiac Electrophysiology

## 2022-08-07 NOTE — PROGRESS NOTES
Patient arrival 1915    Received 324 aspirin and 0.4 nitro prior to arrival     Multiple recent falls, EKG image sent to Dr. Hodges by ERP

## 2022-08-07 NOTE — ASSESSMENT & PLAN NOTE
Patient is alert and oriented, speaking full sentences and protectin her airway.  Patient with 3 falls with facial trauma prior to admission  Suspected due to underlying STEMI and A fib with RVR  Neuro check q 4 hrs  CT head wo contrast with no acute process  If any deterioration consider MRI brain

## 2022-08-07 NOTE — ASSESSMENT & PLAN NOTE
Lateral STEMI  S/p LCH with findings % occlusion, no intervetion performed  Will continue with heparin ggt ACS protocol  Loaded with plavix, will continue with 75 mg tomorrow  High dose statins  Beta blocker tartrate 12.5mg bid  ace inh, consider start in am  Echo   Cardiology was consulted, case discussed with Dr Pearson

## 2022-08-07 NOTE — PROCEDURES
Cardiac Catheterization Procedure    DATE: 8/6/2022    : Camron Mcgowan MD, MPH    PROCEDURES PERFORMED:  1. Left heart catheterization  2. Coronary angiography  3. POBA  4. Moderate conscious sedation    INDICATIONS:  Anterolateral STEMI    CONSENT:  The complete alternatives, risks, and benefits of the procedure were explained to the patient. Signed informed consent was obtained and placed in the chart prior to the procedure.    MEDICATIONS:  1. Lidocaine  2. Fentanyl  3. Midazolam  4. Nitroglycerin  5. Verapamil  6. Heparin  7. Plavix 600mg    MODERATE CONSCIOUS SEDATION:  I personally supervised the administration of moderate conscious sedation by the nursing staff for 41 minutes.  Start time: 8:27 PM  End time: 9:08 PM    CONTRAST: Omnipaque 68 cc    RADIATION (Air Kerma): 369 mGy    FLUOROSCOPY TIME: 12.4 minutes    ESTIMATED BLOOD LOSS: < 50 cc    COMPLICATIONS: None apparent    PROCEDURE OVERVIEW:  The patient was brought to the cardiac catheterization laboratory in the fasting state. The skin over the right wrist and bilateral groins were prepped and draped in the usual sterile fashion. Lidocaine infiltration was used to anesthetize the tissue over the right radial artery. Using the micropuncture technique, and ultrasound guidance, I attempted to access the right radial artery 3 times without success given the heavy calcification of the distal right radial artery.  Then I converted to accessing the right common femoral artery using a micropuncture technique, under fluoroscopic and ultrasound guidance.  A 6-Czech Glidesheath was inserted in the right common femoral artery. A 4 Czech JR4 diagnostic catheter was then advanced over a standard J-wire into the left ventricular cavity where it was gently aspirated, flushed, and then withdrawn across the aortic valve with sequential pressures measured. This catheter was then used to engage the ostium of the right coronary artery and cineangiograms were  obtained in multiple projections for complete evaluation of the right coronary system. This catheter was then exchanged over J-wire to a 6 Bruneian EBU 3.5 guide catheter which was used to engage the ostium of the left coronary artery and and cineangiograms were obtained in multiple projections for complete evaluation of the left coronary system. Following completion of coronary angiography, we proceeded with POBA of the mid/distal LAD. See below for more details.     HEMODYNAMICS:  1. Aortic pressure: 67 /41 mmHg (blood pressure was fluctuating between systolics of 60s - 100s based on her rhythm)  2. LVEDP: 15 mmHg  3. No significant aortic gradient on pullback    CORONARY ANGIOGRAPHY:  1. The left main coronary artery : Normal appearing, bifurcates to LAD and left circumflex  2. The left anterior descending coronary artery: Calcified vessel, mild 30 to 40% proximal stenosis, 100% occluded distal LAD with CARROLL 0 flow, and diffuse 50 to 70% stenoses throughout the mid/distal LAD after the takeoff of the diagonal branch.   3. The left circumflex coronary artery : Diffuse luminal irregularities, circumflex gives off an OM1 and a bifurcating OM 2 before it tapers into the AV groove.  4. The right coronary artery: Dominant vessel calcified proximal and mid RCA, mild 30 to 40% stenosis in the proximal segment.    PERCUTANEOUS CORONARY INTERVENTION:  Pre: 100% stenosis and CARROLL 0 flow  Post: 100% residual stenosis and CARROLL 0 flow.   Guide Catheter(s): 6 Bruneian EBU 3.5  Guidewire(s): Run-through  Anticoagulation: Heparin to maintain ACT more than or equal to 250  Antiplatelet(s): Aspirin, Plavix  Pre-dilation balloon(s): 2 x 8 mm balloon  IVUS or OCT used: None  Intracoronary Atherectomy / Lithotripsy: None  Stent: None  Post-dilation balloon(s): None  After initial predilation, I performed aspiration thrombectomy in the distal LAD without restoration of flow.    I performed several 8 fe inflations of the 2 x 8 mm balloon  throughout the distal/apical LAD with improvement in the distal LAD stenoses but without restoring any distal/apical LAD flow.     No dissection, signs of no-reflow, or perforation post PCI.    Closing: At completion of the procedure the relevant equipment was removed from the body and hemostasis achieved by Perclose for the right common femoral  arteriotomy site.    The patient left the cath lab  CP free,  hemodynamically stable and neurologically intact.      IMPRESSION:  1.  Severe one-vessel CAD (LAD) status post unsuccessful attempt at distal LAD revascularization given diffuse nature of severe obstructive CAD throughout the distal LAD with small in caliber distal/apical LAD.  2.  High normal resting LVEDP with no significant transaortic gradient on pullback  3.  Atrial flutter with RVR  4.  Calcified distal right radial and right ulnar arteries    RECOMMENDATIONS:  1. Continue heparin drip for Atrial fibrillation until baseline creatinine clearance is better clarified before transitioning to a DOAC  2. Plavix 75 mg daily, no aspirin given patient on anticoagulation with Plavix and worsening renal function and anemia compared to prior blood work.  3. Consider metoprolol tartrate 12.5 mg p.o. every 8 hours to help with rate control.  Avoid amiodarone unless absolutely necessary given uncertainty about absence of left atrial appendage thrombus.  Ensure checking baseline LFTs and TSH before starting amiodarone.  4. Plan for BRISA/DCCV if remains in RVR in the morning, n.p.o. after midnight  5. At least 4 hours of bedrest with head of bed elevation < 30 degrees  6. Atorvastatin 40 mg every evening, target LDL<70  7. GDMT for secondary ASCVD prevention  8. Cardiac Rehab referral placed  9. TTE    NOTIFICATION:    The patient's son / daughter in law were notified of the results in the waiting room.    Discussed findings and plan with Dr. Hodges and Dr. Irizarry.    Camron Mcgowan MD, MPH FACC  Interventional  Cardiologist  [facility]   of Clinical Internal Medicine - [facility] [place]

## 2022-08-07 NOTE — PROGRESS NOTES
CRITICAL CARE MEDICINE ATTENDING PROGRESS NOTE    Date of admission  8/6/2022    Chief Complaint  89 y.o. female admitted 8/6/2022 with an acute anterolateral STEMI and AF/AFL with RVR.  She has a history of CKD, HTN, dyslipidemia and GERD.    Hospital Course      8/7 -    titrating heparin drip.  Continue metoprolol tartrate.  Continue clopidogrel and high intensity statin.      Interval Problem Update  Reviewed last 24 hour events:      AF  98.1  Heparin    Addendum at 0800 hrs:    Discussed with cardiology.  We will stop heparin.  Continue clopidogrel.  I will transfuse 1 unit of PRBCs.  Increase metoprolol to tartrate, 12.5 mg every 8 hours.      Review of Systems  Review of Systems   Unable to perform ROS: Acuity of condition       Vital Signs for the last 24 hours  Temp:  [36.2 °C (97.1 °F)-36.7 °C (98.1 °F)] 36.5 °C (97.7 °F)  Pulse:  [] 111  Resp:  [15-47] 15  BP: ()/(42-83) 101/63  SpO2:  [89 %-96 %] 96 %    Hemodynamic parameters for the last 24 hours       Vent Settings for the last 24 hours       Physical Exam  Physical Exam  Constitutional:       Appearance: She is not diaphoretic.   HENT:      Head: Normocephalic.      Mouth/Throat:      Pharynx: Oropharynx is clear.   Eyes:      Pupils: Pupils are equal, round, and reactive to light.   Cardiovascular:      Comments: Atrial fibrillation  Pulmonary:      Breath sounds: No wheezing or rales.   Abdominal:      General: There is no distension.      Tenderness: There is no abdominal tenderness.   Musculoskeletal:      Cervical back: Normal range of motion.      Right lower leg: No edema.      Left lower leg: No edema.   Skin:     General: Skin is warm.      Capillary Refill: Capillary refill takes less than 2 seconds.   Neurological:      General: No focal deficit present.      Mental Status: She is oriented to person, place, and time.      Cranial Nerves: No cranial nerve deficit.         Medications  Current Facility-Administered Medications    Medication Dose Route Frequency Provider Last Rate Last Admin   • magnesium sulfate IVPB premix 4 g  4 g Intravenous Once Chintan Lemon M.D.       • metoprolol tartrate (LOPRESSOR) tablet 12.5 mg  12.5 mg Oral Q8HRS Chintan Lemon M.D.       • NS infusion   Intravenous Continuous Ramsey Irizarry M.D. 50 mL/hr at 08/06/22 2230 New Bag at 08/06/22 2230   • clopidogrel (PLAVIX) tablet 75 mg  75 mg Oral DAILY Camron Mcgowan M.D.   75 mg at 08/07/22 0633   • atorvastatin (LIPITOR) tablet 40 mg  40 mg Oral QHS Camron Mcgowan M.D.   40 mg at 08/06/22 2226   • nitroglycerin (NITROSTAT) tablet 0.4 mg  0.4 mg Sublingual Q5 MIN PRN Ramsey Irizarry M.D.       • famotidine (PEPCID) tablet 10 mg  10 mg Oral DAILY Ramsey Irizarry M.D.   10 mg at 08/07/22 0629   • senna-docusate (PERICOLACE or SENOKOT S) 8.6-50 MG per tablet 2 Tablet  2 Tablet Oral BID Ramsey Irizarry M.D.   2 Tablet at 08/07/22 0629    And   • polyethylene glycol/lytes (MIRALAX) PACKET 1 Packet  1 Packet Oral QDAY PRN Ramsey Irizarry M.D.        And   • magnesium hydroxide (MILK OF MAGNESIA) suspension 30 mL  30 mL Oral QDAY PRN Ramsey Irizarry M.D.        And   • bisacodyl (DULCOLAX) suppository 10 mg  10 mg Rectal QDAY PRN Ramsey Irizarry M.D.       • acetaminophen (Tylenol) tablet 650 mg  650 mg Oral Q6HRS PRN Ramsey Irizarry M.D.           Fluids    Intake/Output Summary (Last 24 hours) at 8/7/2022 0759  Last data filed at 8/7/2022 0600  Gross per 24 hour   Intake 341.81 ml   Output --   Net 341.81 ml       Laboratory      Recent Labs     08/06/22  1917 08/06/22  2211 08/07/22  0124 08/07/22  0420   SODIUM 140  --  140 141   POTASSIUM 4.0  --  4.1 3.9   CHLORIDE 105  --  108 109   CO2 15*  --  18* 19*   BUN 30*  --  30* 29*   CREATININE 2.23*  --  1.93* 1.89*   MAGNESIUM  --  1.6  --  1.6   CALCIUM 8.7  --  8.0* 7.9*     Recent Labs     08/06/22  1917 08/06/22 2211 08/07/22  0124 08/07/22  0420   ALTSGPT 20 20   --  18   ASTSGOT 30 29  --  27   ALKPHOSPHAT 48 43  --  41   TBILIRUBIN 0.4 0.3  --  0.3   DBILIRUBIN  --  <0.2  --   --    LIPASE 27  --   --   --    GLUCOSE 141*  --  105* 89     Recent Labs     08/06/22 1917 08/06/22 2211 08/07/22 0124 08/07/22  0420   WBC 8.1  --  7.9 6.8   NEUTSPOLYS 71.20  --   --  63.80   LYMPHOCYTES 16.40*  --   --  24.20   MONOCYTES 11.60  --   --  11.20   EOSINOPHILS 0.10  --   --  0.00   BASOPHILS 0.10  --   --  0.10   ASTSGOT 30 29  --  27   ALTSGPT 20 20  --  18   ALKPHOSPHAT 48 43  --  41   TBILIRUBIN 0.4 0.3  --  0.3     Recent Labs     08/06/22 1917 08/06/22 2211 08/07/22 0124 08/07/22  0420   RBC 2.68*  --  2.40* 2.34*   HEMOGLOBIN 8.1*  --  7.4* 7.2*   HEMATOCRIT 25.7*  --  22.7* 22.3*   PLATELETCT 136*  --  117* 114*   PROTHROMBTM 15.4* 18.5*  --   --    APTT 33.1 >240.0*  --   --    INR 1.24* 1.57*  --   --        Imaging  X-Ray:  I have personally reviewed the images and compared with prior images. and My impression is: Clear lungs    Assessment/Plan      Acute anterior lateral STEMI   S/P cardiac cath - POBA to LAD with improvement in distal LAD stenoses, but unsuccessful restoration of distal/apical LAD flow   Continue clopidogrel, 75 mg daily   Continue atorvastatin   Increase metoprolol tartrate, 12.5 mg every 8 hours with appropriate hold parameters   Echocardiogram    Atrial fibrillation/atrial flutter   Rate control   Optimize potassium and magnesium   Stop heparin drip given anemia, thrombocytopenia and high risk of bleeding   Continue metoprolol tartrate, 12.5 mg every 8 hours with appropriate hold parameters    Anemia with thrombocytopenia   Trend cell counts    Chronic kidney disease   Monitor renal function and urine output   Avoid nephrotoxins and renal dose medications    Dyslipidemia   Continue statin    DNR/DNI status   This lady clearly affirms to me that she is DNR/DNI      VTE:  Heparin  Ulcer: Not Indicated  Lines: None    I have performed a physical  exam and reviewed and updated ROS and Plan today (8/7/2022). In review of yesterday's note (8/6/2022), there are no changes except as documented above.     I have assessed and reassessed her respiratory status, blood pressure, hemodynamics and cardiovascular status as well as serial determinations of her coagulation status with titration of a heparin drip.  She is at increased risk for worsening cardiovascular system dysfunction.    Discussed patient condition and risk of morbidity and/or mortality with RN, RT, Pharmacy, Charge nurse / hot rounds and QA team     The patient remains critically ill.  Critical care time = 35 minutes in directly providing and coordinating critical care and extensive data review.  No time overlap and excludes procedures.    A Critical Care Medicine progress note may have been authored by a resident physician or advanced practitioner of nursing under my direct supervision on this date of service.  As the supervising and attending physician, I have either attested to or cosigned that document.  IN THE EVENT THAT DISCREPANCIES EXIST BETWEEN THIS DOCUMENT AND ANY DOCUMENT THAT I HAVE ATTESTED TO OR COSIGNED ON THIS DATE OF SERVICE, THEN THIS DOCUMENT REMAINS THE FINAL AUTHORITY AS TO MY ASSESSMENT AND PLAN REGARDING THE CARE OF THIS PATIENT.    Chintan Lemon MD  Pulmonary and Critical Care Medicine

## 2022-08-07 NOTE — ASSESSMENT & PLAN NOTE
Patient presented with A fib RVR, initially low BP  After LHC more stable Bps  Will try beta blocker with the goal of rate control, less ideal treatment is with amiodarone but will consider as alternative  If becomes hemodynamic unstable will consider cardioversion  TTE ordered  Consider BRISA and cardioversion in AM pending clinical evolution of the patient.  Continue with hep ggt (ACS protocol) for now.

## 2022-08-07 NOTE — PROGRESS NOTES
"CARDIOLOGY INPATIENT FOLLOW UP NOTE:    Impressions:  #. Anterolateral STEMI 8/6/22 : Severe one-vessel CAD (LAD) status post unsuccessful attempt at distal LAD revascularization given diffuse nature of severe obstructive CAD throughout the distal LAD with small in caliber distal/apical LAD.  #. Syncope with facial bruising  #. Worsening anemia  #. Worsening thrombocytopnia  #. AF/AFL with RVR - rates better controlled  #. Hypertension  #. BEAU on CKD    Recommendations:  - Given worsening anemia and thrombocytoenia with CKD, she is at elevated bleeding risk and I recommend against AC at this time. No plan for BRISA-DCCV at this time give these concerns.   - Consider stopping heparin drip. Will reassess indication if she has an LV thrombus on echo and discuss again with patient. Discussed with her in details this am balancing bleeding vs VTE risk given her AF and she will think about it and discuss further with her son.  - consider 1 PRBC transfusion to target Hg > 8  - rate control approach: increase metoprolol to 12.5mg PO q8hrs, and increase dose to 25mg if HR and BP tolerates throughout the day.  - continue Plavix 75mg daily without ASA for now and HI statin      Will follow    SUBJECTIVE/INTERIM EVENTS:  Feels \"great\" denying any CP, SOB, orthopnea, palpitations, back pain.    Lying flat in [place] during encounter without any tachypnea or conversational dyspnea. Normal sats on RA.    Tele: A fib with RVR but rates better controlled, 100-110's bpm, compared to yesterday.    No active obvious bleeding.    Right groin site: tender but soft with CDI dressing without hematoma, bruising present.    Prelim TTE being done at bedside: preserved Bi-V systolic function, apical akinesis, AV calcification with AS assessment pending, will also assess for LV apical thrombus presence given apical infarct    EXAM:  /63   Pulse (!) 111   Temp 36.5 °C (97.7 °F) (Temporal)   Resp 15   Wt 57.2 kg (126 lb 1.7 oz)   SpO2 96%   " BMI 24.63 kg/m²   Gen: Ill appearing but comfortable and in no acute distress.  HEENT: Symmetric face. Anicteric sclerae. Moist mucus membranes  NECK: No JVD.   CARDIAC: Normal PMI, irregular, normal S1, S2. with a systolic murmur    RESP: Clear to auscultation bilaterally  ABD: Soft, non-tender, non-distended  EXT: No edema, no clubbing or cyanosis  SKIN: Warm and dry  NEURO: No gross deficits   PSYCH: Appropriate affect, participates in conversation    Studies  Lab Results   Component Value Date/Time    SODIUM 141 08/07/2022 04:20 AM    POTASSIUM 3.9 08/07/2022 04:20 AM    CHLORIDE 109 08/07/2022 04:20 AM    CO2 19 (L) 08/07/2022 04:20 AM    GLUCOSE 89 08/07/2022 04:20 AM    BUN 29 (H) 08/07/2022 04:20 AM    CREATININE 1.89 (H) 08/07/2022 04:20 AM       Medical records reviewed for this encounter    For this encounter I directly reviewed Echo images, Catheterization films and Telemetry tracings      Current Facility-Administered Medications:   •  magnesium sulfate IVPB premix 4 g, 4 g, Intravenous, Once, Chintan Lemon M.D.  •  metoprolol tartrate (LOPRESSOR) tablet 12.5 mg, 12.5 mg, Oral, Q8HRS, Chintan Lemon M.D.  •  NS infusion, , Intravenous, Continuous, Ramsey Irizarry M.D., Last Rate: 50 mL/hr at 08/06/22 2230, New Bag at 08/06/22 2230  •  clopidogrel (PLAVIX) tablet 75 mg, 75 mg, Oral, DAILY, Camron Mcgowan M.D., 75 mg at 08/07/22 0633  •  atorvastatin (LIPITOR) tablet 40 mg, 40 mg, Oral, QHS, Camron Mcgowan M.D., 40 mg at 08/06/22 2226  •  nitroglycerin (NITROSTAT) tablet 0.4 mg, 0.4 mg, Sublingual, Q5 MIN PRN, Ramsey Irizarry M.D.  •  famotidine (PEPCID) tablet 10 mg, 10 mg, Oral, DAILY, Ramsey Irizarry M.D., 10 mg at 08/07/22 0629  •  senna-docusate (PERICOLACE or SENOKOT S) 8.6-50 MG per tablet 2 Tablet, 2 Tablet, Oral, BID, 2 Tablet at 08/07/22 0629 **AND** polyethylene glycol/lytes (MIRALAX) PACKET 1 Packet, 1 Packet, Oral, QDAY PRN **AND** magnesium hydroxide (MILK OF  MAGNESIA) suspension 30 mL, 30 mL, Oral, QDAY PRN **AND** bisacodyl (DULCOLAX) suppository 10 mg, 10 mg, Rectal, QDAY PRN, Ramsey Irizarry M.D.  •  acetaminophen (Tylenol) tablet 650 mg, 650 mg, Oral, Q6HRS PRN, Ramsey Irizarry M.D.    Case discussed with Dr. Lemon and bedside RN.    Camron Mcgowan MD, MPH [facility]  Interventional Cardiologist  [facility]   of Clinical Internal Medicine - [facility]

## 2022-08-07 NOTE — DIETARY
Nutrition services: Day 1 of admit.  Delphine Reeves is a 89 y.o. female with admitting DX of STEMI (ST elevation myocardial infarction)   Consult received for MST of 2 and cardiac diet education.      Assessment:  Weight: 57.2 kg (126 lb 1.7 oz)  Body mass index is 24.63 kg/m²., on 8/7 via bed scale.  BMI classification: Normal weight  Diet/Intake: Cardiac    Evaluation:   1. MST of 2 reports 2-13 lbs of weight loss in 1 month with a decrease in appetite.  2. Spoke with Pt at bed side. Pt confirms weight loss. Pt unable to tell how much weight was loss or what a UBW is. Chart review shows Pt is currently at UBW with no signs of weight loss.  Vitals 9/29/2021 2/2/2022 7/25/2022 8/4/2022 8/7/2022   WEIGHT 125 123 119 123 126.1   3. Pt stated that weight loss was d/t not eating enough. Pt states she had guests come over for a while and she was eating well while the guests came. When the guests left Pt's appetite decreased. Pt states she'll have toast in the morning with one big meal in the afternoon. No PO recorded yet.  4. Pt looked adequately nourished for age.  5. Went over a heart healthy diet education with Pt. Dicussed a heart healthy diet and gave Pt handout regarding a heart heart healthy diet.  6. Labs: Bun 29 (H). Creatinine 1.89 (H). GFR 25 (L).   7. Meds: BM protocol  8. Last BM: PTA    Malnutrition Risk: Does not meet criteria.    Recommendations/Plan:  1. Encourage intake of all meals  2. Document intake of all meals as % taken in ADL's to provide interdisciplinary communication across all shifts.   3. Monitor weight.  4. Nutrition rep will continue to see patient for ongoing meal and snack preferences.     RD available PRN.

## 2022-08-07 NOTE — ED NOTES
Med Rec complete per Pt's home pharmacy and chart review office notes from 8/4/22.  Unknown last doses.  Home Pharmacy:  [facility]

## 2022-08-07 NOTE — CONSULTS
Pulmonary/Critical Care Consultation    Date of consult: 8/6/2022    Referring Physician  Choco Hodges M.D.    Reason for Consultation  STEMI    History of Presenting Illness  89 y.o. female who presented 8/6/2022 with a new STEMI.  Patient has a PMH of HTN, GERD, hx of knee replacement, never smoker and came to the ED via EMS on 8/6/2022 after presenting 3 different falls with head trauma earlier on the same day concerning for syncopal events. She came as a code STEMI response and was given ASA, and nitro. EKG showed lateral STEMI but was reported that ST segment improved on nitro.  Her case was discussed with cardiology Dr Hodges who recommended LHC and PCI after conversation with the family of the patient.   At the ED she was found with hypotension that was intermitent and on A fib with RVR which surprisely was asymptomatic.  Noted that the patient is DNR/DNI.   Her initial workup included a negative head CT, elevated troponins into 1694 range.  The LHC showed a 100% blockage.    Code Status  Full Code    Review of Systems  Review of Systems   Constitutional: Negative for chills, fever and weight loss.   HENT: Negative for congestion, nosebleeds and sore throat.    Eyes: Negative for discharge and redness.   Respiratory: Negative for cough, shortness of breath and wheezing.    Cardiovascular: Negative for chest pain, palpitations and leg swelling.   Gastrointestinal: Negative for nausea and vomiting.   Genitourinary: Negative for dysuria.   Musculoskeletal: Negative for myalgias.   Skin: Negative for rash.   Neurological: Positive for dizziness and weakness. Negative for headaches.   Endo/Heme/Allergies: Does not bruise/bleed easily.   Psychiatric/Behavioral: Negative for memory loss.   All other systems reviewed and are negative.      Past Medical History   has a past medical history of GERD (gastroesophageal reflux disease), Hyperparathyroidism (HCC), and Hypertension.    Surgical History   has a past surgical  history that includes knee replacement, total (Left).    Family History  family history includes Heart Disease in her brother, father, and mother; Lung Disease in her sister; No Known Problems in her brother.    Social History   reports that she has never smoked. She has never used smokeless tobacco. She reports that she does not drink alcohol and does not use drugs.    Medications  Home Medications     Reviewed by Ramsey Irizarry M.D. (Physician) on 08/06/22 at 2143  Med List Status: Complete   Medication Last Dose Status   amLODIPine (NORVASC) 2.5 MG Tab UNK Active   atorvastatin (LIPITOR) 20 MG Tab UNK Active   B Complex-C (B COMPLEX-VITAMIN C PO) UNK Active   Cholecalciferol (VITAMIN D-3) 5000 units Tab UNK Active   famotidine (PEPCID) 40 MG Tab UNK Active   losartan (COZAAR) 50 MG Tab UNK Active   SENSIPAR 60 MG Tab UNK Active              Current Facility-Administered Medications   Medication Dose Route Frequency Provider Last Rate Last Admin   • NS infusion   Intravenous Continuous Ramsey Irizarry M.D.       • [START ON 8/7/2022] clopidogrel (PLAVIX) tablet 75 mg  75 mg Oral DAILY Camron Mcgowan M.D.       • atorvastatin (LIPITOR) tablet 40 mg  40 mg Oral QHS Camron Mcgowan M.D.       • metoprolol tartrate (LOPRESSOR) tablet 12.5 mg  12.5 mg Oral TWICE DAILY Ramsey Irizarry M.D.       • nitroglycerin (NITROSTAT) tablet 0.4 mg  0.4 mg Sublingual Q5 MIN PRN Ramsey Irizarry M.D.       • [START ON 8/7/2022] famotidine (PEPCID) tablet 10 mg  10 mg Oral DAILY Ramsey Irizarry M.D.       • senna-docusate (PERICOLACE or SENOKOT S) 8.6-50 MG per tablet 2 Tablet  2 Tablet Oral BID Ramsey Irizarry M.D.        And   • polyethylene glycol/lytes (MIRALAX) PACKET 1 Packet  1 Packet Oral QDAY PRN Ramsey Irizarry M.D.        And   • magnesium hydroxide (MILK OF MAGNESIA) suspension 30 mL  30 mL Oral QDAY PRN Ramsey J Irizarry, M.D.        And   • bisacodyl (DULCOLAX) suppository 10 mg  10 mg Rectal  QDAY PRN Ramsey Irizarry M.D.       • acetaminophen (Tylenol) tablet 650 mg  650 mg Oral Q6HRS PRN Ramsey Irizarry M.D.       • heparin infusion 25,000 units in 500 mL 0.45% NACL  0-30 Units/kg/hr Intravenous Continuous Ramsey Irizarry M.D.           Allergies  No Known Allergies    Vital Signs last 24 hours  Pulse:  [123-174] 135  Resp:  [16-47] 20  BP: ()/(46-75) 108/69  SpO2:  [93 %-96 %] 94 %    Physical Exam   Physical Exam  Vitals and nursing note reviewed.   Constitutional:       General: She is not in acute distress.     Appearance: Normal appearance. She is not toxic-appearing.   HENT:      Head: Normocephalic and atraumatic.      Nose: Nose normal.      Mouth/Throat:      Mouth: Mucous membranes are moist.   Eyes:      Extraocular Movements: Extraocular movements intact.      Pupils: Pupils are equal, round, and reactive to light.      Comments: Left Shagufta orbital ecchmosis   Cardiovascular:      Rate and Rhythm: Tachycardia present. Rhythm irregular.      Pulses: Normal pulses.      Heart sounds: Normal heart sounds. No murmur heard.    No gallop.   Pulmonary:      Effort: Pulmonary effort is normal. No respiratory distress.      Breath sounds: Normal breath sounds. No stridor. No wheezing or rales.   Abdominal:      General: Bowel sounds are normal. There is no distension.      Tenderness: There is no abdominal tenderness. There is no guarding.   Musculoskeletal:         General: No swelling. Normal range of motion.      Cervical back: Normal range of motion. No rigidity.      Right lower leg: No edema.      Left lower leg: No edema.   Skin:     General: Skin is warm.      Capillary Refill: Capillary refill takes less than 2 seconds.      Findings: No rash.   Neurological:      General: No focal deficit present.      Mental Status: She is alert and oriented to person, place, and time.   Psychiatric:         Mood and Affect: Mood normal.         Fluids  No intake or output data in the 24  hours ending 22 2225    Laboratory  Recent Results (from the past 48 hour(s))   EKG    Collection Time: 22  7:11 PM   Result Value Ref Range    Report       [facility] Emergency Dept.    Test Date:  2022  Pt Name:    [facility]                 Department: ER  MRN:        [id number]                      Room:       TRAUMA - EXAM 1  Gender:     Female                       Technician: [id number]  :        1930                   Requested By:VISHAL LIVINGSTON  Order #:    [id number]                    Reading MD:    Measurements  Intervals                                Axis  Rate:       144                          P:          23  OR:         108                          QRS:        21  QRSD:       78                           T:          29  QT:         308  QTc:        477    Interpretive Statements  SINUS TACHYCARDIA  BORDERLINE R WAVE PROGRESSION, ANTERIOR LEADS  ST ELEVATION, CONSIDER ANTEROLATERAL INJURY  Compared to ECG 2016 20:08:35  ST (T wave) deviation now present  Myocardial infarct finding now present  Sinus rhythm no longer present     EKG    Collection Time: 22  7:11 PM   Result Value Ref Range    Report       [facility] Emergency Dept.    Test Date:  2022  Pt Name:    [facility]                 Department: ER  MRN:        [id number]                      Room:       TRAUMA - EXAM 1  Gender:     Female                       Technician: [id number]  :        1930                   Requested By:VISHAL LIVINGSTON  Order #:    [id number]                    Reading MD:    Measurements  Intervals                                Axis  Rate:       144                          P:          23  OR:         108                          QRS:        21  QRSD:       78                           T:          29  QT:         308  QTc:        477    Interpretive Statements  SINUS TACHYCARDIA  BORDERLINE R WAVE PROGRESSION, ANTERIOR LEADS  ST ELEVATION,  CONSIDER ANTEROLATERAL INJURY  Compared to ECG 2016 20:08:35  ST (T wave) deviation now present  Myocardial infarct finding now present  Sinus rhythm no longer present     EKG    Collection Time: 22  7:11 PM   Result Value Ref Range    Report       [facility] Emergency Dept.    Test Date:  2022  Pt Name:    NEHEMIAH RAMSEY                 Department: ER  MRN:        [id number]                      Room:       [facility]  Gender:     Female                       Technician: [id number]  :        1930                   Requested By:VISHAL CLAIRE  Order #:    [id number]                    Reading MD: Vishal Claire MD    Measurements  Intervals                                Axis  Rate:       144                          P:          23  DC:         108                          QRS:        21  QRSD:       78                           T:          29  QT:         308  QTc:        477    Interpretive Statements  STEMI  SINUS TACHYCARDIA  BORDERLINE R WAVE PROGRESSION, ANTERIOR LEADS  ST ELEVATION, CONSIDER ANTEROLATERAL INJURY  Compared to ECG 2016 20:08:35  ST (T wave) deviation now present  Myocardial infarct finding now present  Sinus rhythm no longer present  Electronically Signed On 2022 22:20:23 PDT by Pratik Claire MD     CBC with Differential    Collection Time: 22  7:17 PM   Result Value Ref Range    WBC 8.1 4.8 - 10.8 K/uL    RBC 2.68 (L) 4.20 - 5.40 M/uL    Hemoglobin 8.1 (L) 12.0 - 16.0 g/dL    Hematocrit 25.7 (L) 37.0 - 47.0 %    MCV 95.9 81.4 - 97.8 fL    MCH 30.2 27.0 - 33.0 pg    MCHC 31.5 (L) 33.6 - 35.0 g/dL    RDW 53.0 (H) 35.9 - 50.0 fL    Platelet Count 136 (L) 164 - 446 K/uL    MPV 12.4 9.0 - 12.9 fL    Neutrophils-Polys 71.20 44.00 - 72.00 %    Lymphocytes 16.40 (L) 22.00 - 41.00 %    Monocytes 11.60 0.00 - 13.40 %    Eosinophils 0.10 0.00 - 6.90 %    Basophils 0.10 0.00 - 1.80 %    Immature Granulocytes 0.60 0.00 - 0.90 %    Nucleated RBC 0.00 /100 WBC     Neutrophils (Absolute) 5.73 2.00 - 7.15 K/uL    Lymphs (Absolute) 1.32 1.00 - 4.80 K/uL    Monos (Absolute) 0.93 (H) 0.00 - 0.85 K/uL    Eos (Absolute) 0.01 0.00 - 0.51 K/uL    Baso (Absolute) 0.01 0.00 - 0.12 K/uL    Immature Granulocytes (abs) 0.05 0.00 - 0.11 K/uL    NRBC (Absolute) 0.00 K/uL   Complete Metabolic Panel (CMP)    Collection Time: 08/06/22  7:17 PM   Result Value Ref Range    Sodium 140 135 - 145 mmol/L    Potassium 4.0 3.6 - 5.5 mmol/L    Chloride 105 96 - 112 mmol/L    Co2 15 (L) 20 - 33 mmol/L    Anion Gap 20.0 (H) 7.0 - 16.0    Glucose 141 (H) 65 - 99 mg/dL    Bun 30 (H) 8 - 22 mg/dL    Creatinine 2.23 (H) 0.50 - 1.40 mg/dL    Calcium 8.7 8.5 - 10.5 mg/dL    AST(SGOT) 30 12 - 45 U/L    ALT(SGPT) 20 2 - 50 U/L    Alkaline Phosphatase 48 30 - 99 U/L    Total Bilirubin 0.4 0.1 - 1.5 mg/dL    Albumin 3.6 3.2 - 4.9 g/dL    Total Protein 5.6 (L) 6.0 - 8.2 g/dL    Globulin 2.0 1.9 - 3.5 g/dL    A-G Ratio 1.8 g/dL   Troponin    Collection Time: 08/06/22  7:17 PM   Result Value Ref Range    Troponin T 1694 (H) 6 - 19 ng/L   PROTHROMBIN TIME    Collection Time: 08/06/22  7:17 PM   Result Value Ref Range    PT 15.4 (H) 12.0 - 14.6 sec    INR 1.24 (H) 0.87 - 1.13   APTT    Collection Time: 08/06/22  7:17 PM   Result Value Ref Range    APTT 33.1 24.7 - 36.0 sec   ESTIMATED GFR    Collection Time: 08/06/22  7:17 PM   Result Value Ref Range    GFR (CKD-EPI) 20 (A) >60 mL/min/1.73 m 2   LIPASE    Collection Time: 08/06/22  7:17 PM   Result Value Ref Range    Lipase 27 11 - 82 U/L   proBrain Natriuretic Peptide, NT    Collection Time: 08/06/22  7:17 PM   Result Value Ref Range    NT-proBNP 5681 (H) 0 - 125 pg/mL   POCT activated clotting time device results    Collection Time: 08/06/22  8:58 PM   Result Value Ref Range    Istat Activated Clotting Time 190 (H) 74 - 137 sec   POCT activated clotting time device results    Collection Time: 08/06/22  9:09 PM   Result Value Ref Range    Istat Activated Clotting Time  225 (H) 74 - 137 sec   EKG    Collection Time: 22  9:56 PM   Result Value Ref Range    Report       [facility]    Test Date:  2022  Pt Name:    [facility]                 Department: ER  MRN:        [id number]                      Room:       27  Gender:     Female                       Technician: JONATHAN  :        1930                   Requested By:VISHAL LIVINGSTON  Order #:    [id number]                    Reading MD:    Measurements  Intervals                                Axis  Rate:       135                          P:  VA:                                      QRS:        36  QRSD:       76                           T:          36  QT:         324  QTc:        486    Interpretive Statements  ATRIAL FIBRILLATION  BORDERLINE LOW VOLTAGE IN FRONTAL LEADS  ST ELEVATION SUGGESTS PERICARDITIS  BORDERLINE PROLONGED QT INTERVAL  Compared to ECG 2022 19:11:58  Sinus tachycardia no longer present  Myocardial infarct finding no longer present  ST (T wave) deviation still present     EKG    Collection Time: 22  9:56 PM   Result Value Ref Range    Report       [facility]    Test Date:  2022  Pt Name:    [facility]                 Department: 161  MRN:        [id number]                      Room:       [facility] [place]  Gender:     Female                       Technician: JONATHAN  :        1930                   Requested By:ANNE CH  Order #:    [id number]                    Reading MD:    Measurements  Intervals                                Axis  Rate:       135                          P:  VA:                                      QRS:        36  QRSD:       76                           T:          36  QT:         324  QTc:        486    Interpretive Statements  ATRIAL FIBRILLATION  BORDERLINE LOW VOLTAGE IN FRONTAL LEADS  ST ELEVATION SUGGESTS PERICARDITIS  BORDERLINE PROLONGED QT INTERVAL  Compared to ECG 2022 19:11:58  Sinus tachycardia no longer  present  Myocardial infarct finding no longer present  ST (T wave) deviation still present         Imaging  CT-HEAD W/O   Final Result         1.  No evidence of acute intracranial hemorrhage or mass lesion.      2. Cerebral atrophy.               DX-CHEST-PORTABLE (1 VIEW)   Final Result         No pneumothorax identified.      DX-CHEST-LIMITED (1 VIEW)   Final Result         Lucency along the left chest wall likely represents skinfold versus pneumothorax. Recommend repeat chest x-ray.      This was discussed with Dr. Claire at 7:40 PM.      CL-LEFT HEART CATHETERIZATION WITH POSSIBLE INTERVENTION    (Results Pending)   EC-ECHOCARDIOGRAM COMPLETE W/O CONT    (Results Pending)       Assessment/Plan  * STEMI (ST elevation myocardial infarction) (HCC)- (present on admission)  Assessment & Plan  Lateral STEMI  S/p LCH with findings % occlusion, no intervetion performed  Will continue with heparin ggt ACS protocol  Loaded with plavix, will continue with 75 mg tomorrow  High dose statins  Beta blocker tartrate 12.5mg bid  ace inh, consider start in am  Echo   Cardiology was consulted, case discussed with Dr Pearson    Syncope- (present on admission)  Assessment & Plan  Patient is alert and oriented, speaking full sentences and protectin her airway.  Patient with 3 falls with facial trauma prior to admission  Suspected due to underlying STEMI and A fib with RVR  Neuro check q 4 hrs  CT head wo contrast with no acute process  If any deterioration consider MRI brain    Chronic atrial fibrillation (HCC)- (present on admission)  Assessment & Plan  Patient presented with A fib RVR, initially low BP  After LHC more stable Bps  Will try beta blocker with the goal of rate control, less ideal treatment is with amiodarone but will consider as alternative  If becomes hemodynamic unstable will consider cardioversion  TTE ordered  Consider BRISA and cardioversion in AM pending clinical evolution of the patient.  Continue with hep  ggt (ACS protocol) for now.    Other specified anemias- (present on admission)  Assessment & Plan  Unclear source of anemia  Will check cbc in am  Keep H/H >8/24 given ACS, consider transfusion if needed  Monitor for any sources of bleeding  Noted that patient is on heparin ggt.    Stage 3b chronic kidney disease (HCC)- (present on admission)  Assessment & Plan  Avoid nephrotoxic agents  S/p LHC  Repeat CMP in am  Gentle hydration    Gastroesophageal reflux disease without esophagitis- (present on admission)  Assessment & Plan  Continue with home pepcid      Essential hypertension- (present on admission)  Assessment & Plan  Hold home meds given hypotension at the ed  Will consider to add medication is a step approach once the BP stabilizes        Discussed patient condition and risk of morbidity and/or mortality with Family, RN, RT, Pharmacy and Patient.    The patient remains critically ill.  Critical care time = 45 minutes in directly providing and coordinating critical care and extensive data review.  No time overlap and excludes procedures.    Ramsey Irizarry MD FACP  Pulmonary/Critical Care

## 2022-08-07 NOTE — ASSESSMENT & PLAN NOTE
Hold home meds given hypotension at the ed  Will consider to add medication is a step approach once the BP stabilizes

## 2022-08-07 NOTE — PROGRESS NOTES
Monitor Summary  Afib   Converted to SB around noon with continued ST elevation  Intensivist and cardiology notified   SB 50-56

## 2022-08-08 PROBLEM — I48.0 PAROXYSMAL A-FIB (HCC): Status: ACTIVE | Noted: 2022-08-06

## 2022-08-08 LAB
ALBUMIN SERPL BCP-MCNC: 3.6 G/DL (ref 3.2–4.9)
ALBUMIN/GLOB SERPL: 2 G/DL
ALP SERPL-CCNC: 62 U/L (ref 30–99)
ALT SERPL-CCNC: 21 U/L (ref 2–50)
ANION GAP SERPL CALC-SCNC: 14 MMOL/L (ref 7–16)
AST SERPL-CCNC: 24 U/L (ref 12–45)
BASOPHILS # BLD AUTO: 0.1 % (ref 0–1.8)
BASOPHILS # BLD: 0.01 K/UL (ref 0–0.12)
BILIRUB SERPL-MCNC: 0.3 MG/DL (ref 0.1–1.5)
BUN SERPL-MCNC: 32 MG/DL (ref 8–22)
CALCIUM SERPL-MCNC: 7.7 MG/DL (ref 8.5–10.5)
CHLORIDE SERPL-SCNC: 107 MMOL/L (ref 96–112)
CO2 SERPL-SCNC: 17 MMOL/L (ref 20–33)
CREAT SERPL-MCNC: 1.76 MG/DL (ref 0.5–1.4)
EOSINOPHIL # BLD AUTO: 0.01 K/UL (ref 0–0.51)
EOSINOPHIL NFR BLD: 0.1 % (ref 0–6.9)
ERYTHROCYTE [DISTWIDTH] IN BLOOD BY AUTOMATED COUNT: 54.9 FL (ref 35.9–50)
GFR SERPLBLD CREATININE-BSD FMLA CKD-EPI: 27 ML/MIN/1.73 M 2
GLOBULIN SER CALC-MCNC: 1.8 G/DL (ref 1.9–3.5)
GLUCOSE SERPL-MCNC: 118 MG/DL (ref 65–99)
HCT VFR BLD AUTO: 27.6 % (ref 37–47)
HGB BLD-MCNC: 9 G/DL (ref 12–16)
IMM GRANULOCYTES # BLD AUTO: 0.03 K/UL (ref 0–0.11)
IMM GRANULOCYTES NFR BLD AUTO: 0.4 % (ref 0–0.9)
LYMPHOCYTES # BLD AUTO: 1.38 K/UL (ref 1–4.8)
LYMPHOCYTES NFR BLD: 20.3 % (ref 22–41)
MAGNESIUM SERPL-MCNC: 2.9 MG/DL (ref 1.5–2.5)
MCH RBC QN AUTO: 30.6 PG (ref 27–33)
MCHC RBC AUTO-ENTMCNC: 32.6 G/DL (ref 33.6–35)
MCV RBC AUTO: 93.9 FL (ref 81.4–97.8)
MONOCYTES # BLD AUTO: 0.88 K/UL (ref 0–0.85)
MONOCYTES NFR BLD AUTO: 12.9 % (ref 0–13.4)
NEUTROPHILS # BLD AUTO: 4.49 K/UL (ref 2–7.15)
NEUTROPHILS NFR BLD: 66.2 % (ref 44–72)
NRBC # BLD AUTO: 0 K/UL
NRBC BLD-RTO: 0 /100 WBC
PLATELET # BLD AUTO: 131 K/UL (ref 164–446)
PMV BLD AUTO: 12.1 FL (ref 9–12.9)
POTASSIUM SERPL-SCNC: 3.8 MMOL/L (ref 3.6–5.5)
PROT SERPL-MCNC: 5.4 G/DL (ref 6–8.2)
RBC # BLD AUTO: 2.94 M/UL (ref 4.2–5.4)
SODIUM SERPL-SCNC: 138 MMOL/L (ref 135–145)
WBC # BLD AUTO: 6.8 K/UL (ref 4.8–10.8)

## 2022-08-08 PROCEDURE — 85025 COMPLETE CBC W/AUTO DIFF WBC: CPT

## 2022-08-08 PROCEDURE — 99233 SBSQ HOSP IP/OBS HIGH 50: CPT | Performed by: INTERNAL MEDICINE

## 2022-08-08 PROCEDURE — A9270 NON-COVERED ITEM OR SERVICE: HCPCS | Performed by: HOSPITALIST

## 2022-08-08 PROCEDURE — A9270 NON-COVERED ITEM OR SERVICE: HCPCS | Performed by: INTERNAL MEDICINE

## 2022-08-08 PROCEDURE — 770020 HCHG ROOM/CARE - TELE (206)

## 2022-08-08 PROCEDURE — 99233 SBSQ HOSP IP/OBS HIGH 50: CPT | Performed by: STUDENT IN AN ORGANIZED HEALTH CARE EDUCATION/TRAINING PROGRAM

## 2022-08-08 PROCEDURE — 700102 HCHG RX REV CODE 250 W/ 637 OVERRIDE(OP): Performed by: INTERNAL MEDICINE

## 2022-08-08 PROCEDURE — A9270 NON-COVERED ITEM OR SERVICE: HCPCS | Performed by: STUDENT IN AN ORGANIZED HEALTH CARE EDUCATION/TRAINING PROGRAM

## 2022-08-08 PROCEDURE — 700102 HCHG RX REV CODE 250 W/ 637 OVERRIDE(OP): Performed by: STUDENT IN AN ORGANIZED HEALTH CARE EDUCATION/TRAINING PROGRAM

## 2022-08-08 PROCEDURE — 80053 COMPREHEN METABOLIC PANEL: CPT

## 2022-08-08 PROCEDURE — 83735 ASSAY OF MAGNESIUM: CPT

## 2022-08-08 PROCEDURE — 700102 HCHG RX REV CODE 250 W/ 637 OVERRIDE(OP): Performed by: HOSPITALIST

## 2022-08-08 RX ORDER — METOPROLOL SUCCINATE 25 MG/1
25 TABLET, EXTENDED RELEASE ORAL
Status: DISCONTINUED | OUTPATIENT
Start: 2022-08-09 | End: 2022-08-09 | Stop reason: HOSPADM

## 2022-08-08 RX ORDER — CALCIUM CARBONATE 500 MG/1
500 TABLET, CHEWABLE ORAL 3 TIMES DAILY PRN
Status: DISCONTINUED | OUTPATIENT
Start: 2022-08-08 | End: 2022-08-09 | Stop reason: HOSPADM

## 2022-08-08 RX ORDER — OMEPRAZOLE 20 MG/1
20 CAPSULE, DELAYED RELEASE ORAL DAILY
Status: DISCONTINUED | OUTPATIENT
Start: 2022-08-09 | End: 2022-08-09 | Stop reason: HOSPADM

## 2022-08-08 RX ADMIN — CALCIUM CARBONATE 500 MG: 500 TABLET, CHEWABLE ORAL at 20:40

## 2022-08-08 RX ADMIN — METOPROLOL TARTRATE 12.5 MG: 25 TABLET, FILM COATED ORAL at 16:57

## 2022-08-08 RX ADMIN — CLOPIDOGREL BISULFATE 75 MG: 75 TABLET ORAL at 05:58

## 2022-08-08 RX ADMIN — METOPROLOL TARTRATE 12.5 MG: 25 TABLET, FILM COATED ORAL at 05:58

## 2022-08-08 RX ADMIN — FAMOTIDINE 10 MG: 20 TABLET, FILM COATED ORAL at 05:58

## 2022-08-08 RX ADMIN — ATORVASTATIN CALCIUM 40 MG: 40 TABLET, FILM COATED ORAL at 20:42

## 2022-08-08 ASSESSMENT — ENCOUNTER SYMPTOMS
INSOMNIA: 0
NAUSEA: 0
SHORTNESS OF BREATH: 0
PALPITATIONS: 0
CHILLS: 0
BLURRED VISION: 0
EYE PAIN: 0
BACK PAIN: 0
FEVER: 0
ABDOMINAL PAIN: 0
DIZZINESS: 0
VOMITING: 0
HEADACHES: 0
COUGH: 0

## 2022-08-08 ASSESSMENT — LIFESTYLE VARIABLES: SUBSTANCE_ABUSE: 0

## 2022-08-08 ASSESSMENT — PAIN DESCRIPTION - PAIN TYPE
TYPE: ACUTE PAIN

## 2022-08-08 ASSESSMENT — FIBROSIS 4 INDEX: FIB4 SCORE: 3.68

## 2022-08-08 NOTE — CARE PLAN
The patient is Stable - Low risk of patient condition declining or worsening    Shift Goals  Clinical Goals: hemodynamic stability, mobilization progression, sleep, stable H&H  Patient Goals: rest  Family Goals: unable to assess    Progress made toward(s) clinical / shift goals:    Problem: Knowledge Deficit - Standard  Goal: Patient and family/care givers will demonstrate understanding of plan of care, disease process/condition, diagnostic tests and medications  Outcome: Progressing     Problem: Psychosocial  Goal: Patient's ability to verbalize feelings about condition will improve  Outcome: Progressing     Problem: Fall Risk  Goal: Patient will remain free from falls  Outcome: Progressing     Problem: Pain - Standard  Goal: Alleviation of pain or a reduction in pain to the patient’s comfort goal  Outcome: Progressing

## 2022-08-08 NOTE — PROGRESS NOTES
Hospital Medicine Daily Progress Note    Date of Service  8/8/2022    Chief Complaint  Delphine Reeves is a 89 y.o. female admitted 8/6/2022 with STEMI.    Hospital Course  89 y.o. female who presented 8/6/2022 with a new STEMI.  Patient has a PMH of HTN, GERD, hx of knee replacement, never smoker and came to the ED via EMS on 8/6/2022 after presenting 3 different falls with head trauma earlier on the same day concerning for syncopal events. She came as a code STEMI response and was given ASA, and nitro. EKG showed lateral STEMI but was reported that ST segment improved on nitro.  Her case was discussed with cardiology Dr Hodges who recommended LHC and PCI after conversation with the family of the patient.   At the ED she was found with hypotension that was intermitent and on A fib with RVR which surprisely was asymptomatic. Her initial workup included a negative head CT, elevated troponins into 1694 range. The LHC showed a 100% blockage of distal LAD that was not amenable to balloon angioplasty, no stents placed. Patient admitted to the ICU post cath for close monitoring.    Interval Problem Update  Transferred out of ICU today.  Patient feeling well, denies all complaints.  On plavix. Hgb stable. FOBT positive. Discussed findings with patient, recommend outpatient PCP follow up and consideration for GI evaluation. However GI evaluation for FOBT positive test is usually not recommended in her age group as evaluation usually leads to more unnecessary testing without great benefit in life expectancy.  Cardiology following. No aspirin needed per cardiology, can hold off anticoagulation given anemia.  On metoprolol. In sinus rhythm/bradycardia.  Cardiology adjusting medications.  PT/OT evaluation pending.      I have discussed this patient's plan of care and discharge plan at IDT rounds today with Case Management, Nursing, Nursing leadership, and other members of the IDT team.    Consultants/Specialty  cardiology and  critical care    Code Status  DNAR/DNI    Disposition  Patient is not medically cleared for discharge.   Anticipate discharge to to home with close outpatient follow-up.  I have placed the appropriate orders for post-discharge needs.    Review of Systems  Review of Systems   Constitutional: Negative for chills and fever.   Eyes: Negative for blurred vision and pain.   Respiratory: Negative for cough and shortness of breath.    Cardiovascular: Negative for chest pain, palpitations and leg swelling.   Gastrointestinal: Negative for abdominal pain, nausea and vomiting.   Genitourinary: Negative for dysuria and urgency.   Musculoskeletal: Negative for back pain.   Skin: Negative for itching and rash.   Neurological: Negative for dizziness and headaches.   Psychiatric/Behavioral: Negative for substance abuse. The patient does not have insomnia.         Physical Exam  Temp:  [36.2 °C (97.2 °F)-36.7 °C (98 °F)] 36.4 °C (97.5 °F)  Pulse:  [51-79] 51  Resp:  [3-29] 27  BP: ()/(48-74) 119/58  SpO2:  [89 %-95 %] 95 %    Physical Exam  Vitals reviewed.   Constitutional:       General: She is not in acute distress.     Appearance: She is not diaphoretic.   HENT:      Head: Normocephalic.      Comments: Facial bruising     Right Ear: External ear normal.      Left Ear: External ear normal.      Nose: Nose normal. No congestion.      Mouth/Throat:      Pharynx: No oropharyngeal exudate or posterior oropharyngeal erythema.   Eyes:      Extraocular Movements: Extraocular movements intact.      Pupils: Pupils are equal, round, and reactive to light.   Cardiovascular:      Rate and Rhythm: Normal rate and regular rhythm.   Pulmonary:      Effort: Pulmonary effort is normal. No respiratory distress.      Breath sounds: Normal breath sounds. No wheezing or rales.   Abdominal:      General: Bowel sounds are normal. There is no distension.      Palpations: Abdomen is soft.      Tenderness: There is no abdominal tenderness. There is  no guarding.   Musculoskeletal:         General: No swelling. Normal range of motion.      Cervical back: Normal range of motion and neck supple.      Right lower leg: No edema.      Left lower leg: No edema.   Skin:     General: Skin is warm and dry.   Neurological:      General: No focal deficit present.      Mental Status: She is alert and oriented to person, place, and time.      Sensory: No sensory deficit.      Motor: No weakness.   Psychiatric:         Mood and Affect: Mood normal.         Behavior: Behavior normal.         Fluids    Intake/Output Summary (Last 24 hours) at 8/8/2022 1402  Last data filed at 8/8/2022 0800  Gross per 24 hour   Intake 1860 ml   Output 400 ml   Net 1460 ml       Laboratory  Recent Labs     08/07/22  1412 08/07/22 2039 08/08/22  0133   WBC 6.7 6.5 6.8   RBC 3.07* 2.67* 2.94*   HEMOGLOBIN 9.4* 8.2* 9.0*   HEMATOCRIT 28.9* 25.1* 27.6*   MCV 94.1 94.0 93.9   MCH 30.6 30.7 30.6   MCHC 32.5* 32.7* 32.6*   RDW 53.8* 54.9* 54.9*   PLATELETCT 108* 121* 131*   MPV 12.5 12.3 12.1     Recent Labs     08/07/22  0124 08/07/22  0420 08/08/22  0133   SODIUM 140 141 138   POTASSIUM 4.1 3.9 3.8   CHLORIDE 108 109 107   CO2 18* 19* 17*   GLUCOSE 105* 89 118*   BUN 30* 29* 32*   CREATININE 1.93* 1.89* 1.76*   CALCIUM 8.0* 7.9* 7.7*     Recent Labs     08/06/22  1917 08/06/22  2211   APTT 33.1 >240.0*   INR 1.24* 1.57*         Recent Labs     08/07/22  0124   TRIGLYCERIDE 41   HDL 43   LDL 43       Imaging  EC-ECHOCARDIOGRAM COMPLETE W/ CONT   Final Result      CT-HEAD W/O   Final Result         1.  No evidence of acute intracranial hemorrhage or mass lesion.      2. Cerebral atrophy.               DX-CHEST-PORTABLE (1 VIEW)   Final Result         No pneumothorax identified.      DX-CHEST-LIMITED (1 VIEW)   Final Result         Lucency along the left chest wall likely represents skinfold versus pneumothorax. Recommend repeat chest x-ray.      This was discussed with Dr. Claire at 7:40 PM.      CL-LEFT  HEART CATHETERIZATION WITH POSSIBLE INTERVENTION    (Results Pending)        Assessment/Plan  * STEMI (ST elevation myocardial infarction) (HCC)- (present on admission)  Assessment & Plan  Presented as STEMI  Taken to cath lab, 100% occluded distal LAD, unsuccessful angioplasty, no stents placed  Chest pain free  On plavix  Found to be in atrial fibrillation on presentation, now in sinus rhythm/bradycardia  Cardiology following, adjusting medications  Given anemia, not on heparin drip or aspirin    Paroxysmal A-fib (HCC)- (present on admission)  Assessment & Plan  Found to have a fib with RVR on presentation, now resolved  Was on heparin drip which has been discontinued  On metoprolol  Echo shows EF 55% with apical akinesis  Cardiology following, appreciate recommendations    Gastroesophageal reflux disease without esophagitis- (present on admission)  Assessment & Plan  Continue famotidine    Essential hypertension- (present on admission)  Assessment & Plan  Continue metoprolol  Holding home losartan for now given hypotension  Cardiology adjusting medications       VTE prophylaxis: SCDs/TEDs    I have performed a physical exam and reviewed and updated ROS and Plan today (8/8/2022). In review of yesterday's note (8/7/2022), there are no changes except as documented above.

## 2022-08-08 NOTE — PROGRESS NOTES
4 Eyes Skin Assessment Completed by SEAN Kirkland and SEAN Perkins.    Head Bruising and Redness  Ears WDL  Nose Scab  Mouth WDL  Neck WDL  Breast/Chest WDL  Shoulder Blades WDL  Spine WDL  (R) Arm/Elbow/Hand Blanching, Bruising, Abrasion and Scab  (L) Arm/Elbow/Hand Blanching, Non-Blanching, Abrasion, Scab and Discoloration  Abdomen WDL  Groin WDL  Scrotum/Coccyx/Buttocks Redness and Blanching  (R) Leg WDL  (L) Leg WDL  (R) Heel/Foot/Toe WDL  (L) Heel/Foot/Toe WDL      -Wound team already consulted and saw patient. All pictures previously uploaded.    Devices In Places Tele Box, Blood Pressure Cuff and Pulse Ox      Interventions In Place Pressure Redistribution Mattress    Possible Skin Injury Yes    Pictures Uploaded Into Epic Yes  Wound Consult Placed N/A  RN Wound Prevention Protocol Ordered No

## 2022-08-08 NOTE — PROGRESS NOTES
Update to Dr. Paty Joseph about drop in H&H, Hgb 9.4 to 8.2 from 1400 to 2039.    Also updated  patient is more forgetful and confused.     Orders to transfuse if Hgb <8 this AM.

## 2022-08-08 NOTE — ASSESSMENT & PLAN NOTE
Found to have a fib with RVR on presentation, now resolved  Was on heparin drip which has been discontinued  On metoprolol  Echo shows EF 55% with apical akinesis  Cardiology following, appreciate recommendations

## 2022-08-08 NOTE — PROGRESS NOTES
Transported pt to [id number] on telebox and KEVAN SOLIS. Pt expresses no questions at this time, Marita ESTRADA and this RN discussed POC.

## 2022-08-08 NOTE — ASSESSMENT & PLAN NOTE
Presented as STEMI  Taken to cath lab, 100% occluded distal LAD, unsuccessful angioplasty, no stents placed  Chest pain free  On plavix  Found to be in atrial fibrillation on presentation, now in sinus rhythm/bradycardia  Cardiology following, adjusting medications  Given anemia, not on heparin drip or aspirin

## 2022-08-08 NOTE — ASSESSMENT & PLAN NOTE
Continue metoprolol  Holding home losartan for now given hypotension  Cardiology adjusting medications

## 2022-08-08 NOTE — PROGRESS NOTES
Patient up to unit from [facility]. Patient is A&Ox4, no complaints of pain at this time, VSS, no signs of distress. Tele monitor placed and verified by monitor room. All questions and concerns answered, call light in reach, will continue to monitor.    Agree with previous RN charting.

## 2022-08-09 ENCOUNTER — PHARMACY VISIT (OUTPATIENT)
Dept: PHARMACY | Facility: MEDICAL CENTER | Age: 87
End: 2022-08-09
Payer: COMMERCIAL

## 2022-08-09 VITALS
BODY MASS INDEX: 25.14 KG/M2 | WEIGHT: 128.75 LBS | OXYGEN SATURATION: 93 % | DIASTOLIC BLOOD PRESSURE: 51 MMHG | RESPIRATION RATE: 18 BRPM | TEMPERATURE: 96.8 F | HEART RATE: 61 BPM | SYSTOLIC BLOOD PRESSURE: 106 MMHG

## 2022-08-09 LAB
ALBUMIN SERPL BCP-MCNC: 3.3 G/DL (ref 3.2–4.9)
ALBUMIN/GLOB SERPL: 1.7 G/DL
ALP SERPL-CCNC: 57 U/L (ref 30–99)
ALT SERPL-CCNC: 20 U/L (ref 2–50)
ANION GAP SERPL CALC-SCNC: 12 MMOL/L (ref 7–16)
AST SERPL-CCNC: 20 U/L (ref 12–45)
BILIRUB SERPL-MCNC: 0.3 MG/DL (ref 0.1–1.5)
BUN SERPL-MCNC: 26 MG/DL (ref 8–22)
CALCIUM SERPL-MCNC: 7.4 MG/DL (ref 8.5–10.5)
CHLORIDE SERPL-SCNC: 111 MMOL/L (ref 96–112)
CO2 SERPL-SCNC: 18 MMOL/L (ref 20–33)
CREAT SERPL-MCNC: 1.49 MG/DL (ref 0.5–1.4)
ERYTHROCYTE [DISTWIDTH] IN BLOOD BY AUTOMATED COUNT: 54.6 FL (ref 35.9–50)
GFR SERPLBLD CREATININE-BSD FMLA CKD-EPI: 33 ML/MIN/1.73 M 2
GLOBULIN SER CALC-MCNC: 1.9 G/DL (ref 1.9–3.5)
GLUCOSE SERPL-MCNC: 97 MG/DL (ref 65–99)
HCT VFR BLD AUTO: 26 % (ref 37–47)
HGB BLD-MCNC: 8.3 G/DL (ref 12–16)
MAGNESIUM SERPL-MCNC: 2.3 MG/DL (ref 1.5–2.5)
MCH RBC QN AUTO: 30.4 PG (ref 27–33)
MCHC RBC AUTO-ENTMCNC: 31.9 G/DL (ref 33.6–35)
MCV RBC AUTO: 95.2 FL (ref 81.4–97.8)
PLATELET # BLD AUTO: 125 K/UL (ref 164–446)
PMV BLD AUTO: 12.1 FL (ref 9–12.9)
POTASSIUM SERPL-SCNC: 3.7 MMOL/L (ref 3.6–5.5)
PROT SERPL-MCNC: 5.2 G/DL (ref 6–8.2)
RBC # BLD AUTO: 2.73 M/UL (ref 4.2–5.4)
SODIUM SERPL-SCNC: 141 MMOL/L (ref 135–145)
WBC # BLD AUTO: 7.6 K/UL (ref 4.8–10.8)

## 2022-08-09 PROCEDURE — 85027 COMPLETE CBC AUTOMATED: CPT

## 2022-08-09 PROCEDURE — 36415 COLL VENOUS BLD VENIPUNCTURE: CPT

## 2022-08-09 PROCEDURE — A9270 NON-COVERED ITEM OR SERVICE: HCPCS | Performed by: INTERNAL MEDICINE

## 2022-08-09 PROCEDURE — 97161 PT EVAL LOW COMPLEX 20 MIN: CPT

## 2022-08-09 PROCEDURE — 80053 COMPREHEN METABOLIC PANEL: CPT

## 2022-08-09 PROCEDURE — 700102 HCHG RX REV CODE 250 W/ 637 OVERRIDE(OP): Performed by: INTERNAL MEDICINE

## 2022-08-09 PROCEDURE — RXMED WILLOW AMBULATORY MEDICATION CHARGE: Performed by: INTERNAL MEDICINE

## 2022-08-09 PROCEDURE — 99233 SBSQ HOSP IP/OBS HIGH 50: CPT | Mod: FS | Performed by: INTERNAL MEDICINE

## 2022-08-09 PROCEDURE — 99239 HOSP IP/OBS DSCHRG MGMT >30: CPT | Performed by: INTERNAL MEDICINE

## 2022-08-09 PROCEDURE — 83735 ASSAY OF MAGNESIUM: CPT

## 2022-08-09 RX ORDER — CLOPIDOGREL BISULFATE 75 MG/1
75 TABLET ORAL DAILY
Qty: 90 TABLET | Refills: 0 | Status: SHIPPED | OUTPATIENT
Start: 2022-08-10 | End: 2022-10-03 | Stop reason: SDUPTHER

## 2022-08-09 RX ORDER — METOPROLOL SUCCINATE 25 MG/1
25 TABLET, EXTENDED RELEASE ORAL DAILY
Qty: 30 TABLET | Refills: 0 | Status: SHIPPED | OUTPATIENT
Start: 2022-08-10 | End: 2022-09-01 | Stop reason: SDUPTHER

## 2022-08-09 RX ORDER — ATORVASTATIN CALCIUM 40 MG/1
40 TABLET, FILM COATED ORAL
Qty: 30 TABLET | Refills: 0 | Status: SHIPPED | OUTPATIENT
Start: 2022-08-09 | End: 2022-09-01 | Stop reason: SDUPTHER

## 2022-08-09 RX ORDER — OMEPRAZOLE 20 MG/1
20 CAPSULE, DELAYED RELEASE ORAL DAILY
Qty: 30 CAPSULE | Refills: 0 | Status: SHIPPED | OUTPATIENT
Start: 2022-08-10 | End: 2022-09-01 | Stop reason: SDUPTHER

## 2022-08-09 RX ADMIN — OMEPRAZOLE 20 MG: 20 CAPSULE, DELAYED RELEASE ORAL at 06:27

## 2022-08-09 RX ADMIN — CLOPIDOGREL BISULFATE 75 MG: 75 TABLET ORAL at 06:26

## 2022-08-09 RX ADMIN — METOPROLOL SUCCINATE 25 MG: 25 TABLET, EXTENDED RELEASE ORAL at 06:26

## 2022-08-09 RX ADMIN — SENNOSIDES AND DOCUSATE SODIUM 2 TABLET: 50; 8.6 TABLET ORAL at 06:27

## 2022-08-09 ASSESSMENT — ENCOUNTER SYMPTOMS
CHILLS: 0
WOUND: 1
NERVOUS/ANXIOUS: 0
CHEST TIGHTNESS: 0
SHORTNESS OF BREATH: 0
AGITATION: 0
COUGH: 0
TROUBLE SWALLOWING: 0
COLOR CHANGE: 1
FEVER: 0
NUMBNESS: 0
ABDOMINAL DISTENTION: 0
PALPITATIONS: 0
DIZZINESS: 0
BLOOD IN STOOL: 0
ABDOMINAL PAIN: 0
BRUISES/BLEEDS EASILY: 1
DIAPHORESIS: 0
CONFUSION: 0

## 2022-08-09 ASSESSMENT — COGNITIVE AND FUNCTIONAL STATUS - GENERAL
MOVING TO AND FROM BED TO CHAIR: A LITTLE
TURNING FROM BACK TO SIDE WHILE IN FLAT BAD: A LITTLE
MOVING FROM LYING ON BACK TO SITTING ON SIDE OF FLAT BED: A LITTLE
WALKING IN HOSPITAL ROOM: A LITTLE
STANDING UP FROM CHAIR USING ARMS: A LITTLE
MOBILITY SCORE: 18
SUGGESTED CMS G CODE MODIFIER MOBILITY: CK
CLIMB 3 TO 5 STEPS WITH RAILING: A LITTLE

## 2022-08-09 ASSESSMENT — GAIT ASSESSMENTS
GAIT LEVEL OF ASSIST: SUPERVISED
DISTANCE (FEET): 40
ASSISTIVE DEVICE: FRONT WHEEL WALKER

## 2022-08-09 NOTE — PROGRESS NOTES
Monitor summary: SB/Afib , KS .14, QRS .06, QT .38 and converted to afib per strip from monitor room.  (R)PVC

## 2022-08-09 NOTE — DISCHARGE PLANNING
Dr. Portillo filled out a confidential physician's report for the [employer].  RN CM faxed the form to the [place] [employer] at [phone].

## 2022-08-09 NOTE — CARE PLAN
The patient is Stable - Low risk of patient condition declining or worsening    Shift Goals  Clinical Goals: Monitor H&H, safety  Patient Goals: Sleep  Family Goals: TANIA    Progress made toward(s) clinical / shift goals:    Problem: Knowledge Deficit - Standard  Goal: Patient and family/care givers will demonstrate understanding of plan of care, disease process/condition, diagnostic tests and medications  Outcome: Progressing     Problem: Pain - Standard  Goal: Alleviation of pain or a reduction in pain to the patient’s comfort goal  Outcome: Progressing     Problem: Psychosocial  Goal: Patient's ability to verbalize feelings about condition will improve  Outcome: Progressing       Patient is not progressing towards the following goals:       You can access the FollowMyHealth Patient Portal offered by [facility] by registering at the following website: [email or web address]. By joining [facility]’s FollowMyHealth portal, you will also be able to view your health information using other applications (apps) compatible with our system.

## 2022-08-09 NOTE — PROGRESS NOTES
Cardiology Follow Up Progress Note    Date of Service  8/9/2022    Attending Physician  Tristan Portillo M.D.    Chief Complaint   GLF     Cardiology consult   Lateral STEMI    HPI  Delphine Reeves is a 89 y.o. female admitted 8/6/2022 with past medical history of hypertension, anemia, falls and CKD.    EMS was called after patient had a fall she was noted to be in atrial flutter with pretension.  EKG showed a lateral STEMI, taken for stat head CT to rule out hemorrhage due to her recent falls.  Then proceeded for cardiac catheterization which showed severe one-vessel LAD stenosis status post unsuccessful attempt at distal LAD given diffuse nature of severe obstructive CAD throughout the distal LAD with small in caliber.    Interim Events  Sitting up in a chair   Denies any chest pain, sob, dizziness or palpitations.   hgb stable 8-9  Vs stable   Afib/ SB overnight     Review of Systems  Review of Systems   Constitutional: Negative for chills, diaphoresis and fever.   HENT: Negative for nosebleeds and trouble swallowing.    Respiratory: Negative for cough, chest tightness and shortness of breath.    Cardiovascular: Negative for chest pain, palpitations and leg swelling.   Gastrointestinal: Negative for abdominal distention, abdominal pain and blood in stool.   Genitourinary: Negative for hematuria.   Skin: Positive for color change and wound.   Neurological: Negative for dizziness, syncope and numbness.   Hematological: Bruises/bleeds easily.   Psychiatric/Behavioral: Negative for agitation and confusion. The patient is not nervous/anxious.        Vital signs in last 24 hours  Temp:  [36 °C (96.8 °F)-36.5 °C (97.7 °F)] 36 °C (96.8 °F)  Pulse:  [51-99] 97  Resp:  [16-28] 17  BP: ()/(51-74) 105/53  SpO2:  [90 %-95 %] 93 %    Physical Exam  Physical Exam  Vitals reviewed.   Constitutional:       Appearance: Normal appearance.   Cardiovascular:      Rate and Rhythm: Normal rate. Rhythm irregular.      Heart sounds: No  murmur heard.  Pulmonary:      Effort: Pulmonary effort is normal.      Breath sounds: Normal breath sounds.   Musculoskeletal:      Right lower leg: No edema.      Left lower leg: No edema.   Skin:     Findings: Bruising present.   Neurological:      Mental Status: She is alert and oriented to person, place, and time.   Psychiatric:         Mood and Affect: Mood normal.         Behavior: Behavior normal.         Thought Content: Thought content normal.         Lab Review  Lab Results   Component Value Date/Time    WBC 7.6 08/09/2022 01:24 AM    RBC 2.73 (L) 08/09/2022 01:24 AM    HEMOGLOBIN 8.3 (L) 08/09/2022 01:24 AM    HEMATOCRIT 26.0 (L) 08/09/2022 01:24 AM    MCV 95.2 08/09/2022 01:24 AM    MCH 30.4 08/09/2022 01:24 AM    MCHC 31.9 (L) 08/09/2022 01:24 AM    MPV 12.1 08/09/2022 01:24 AM      Lab Results   Component Value Date/Time    SODIUM 141 08/09/2022 01:24 AM    POTASSIUM 3.7 08/09/2022 01:24 AM    CHLORIDE 111 08/09/2022 01:24 AM    CO2 18 (L) 08/09/2022 01:24 AM    GLUCOSE 97 08/09/2022 01:24 AM    BUN 26 (H) 08/09/2022 01:24 AM    CREATININE 1.49 (H) 08/09/2022 01:24 AM      Lab Results   Component Value Date/Time    ASTSGOT 20 08/09/2022 01:24 AM    ALTSGPT 20 08/09/2022 01:24 AM     Lab Results   Component Value Date/Time    CHOLSTRLTOT 94 (L) 08/07/2022 01:24 AM    LDL 43 08/07/2022 01:24 AM    HDL 43 08/07/2022 01:24 AM    TRIGLYCERIDE 41 08/07/2022 01:24 AM    TROPONINT 1498 (H) 08/07/2022 01:24 AM       Recent Labs     08/06/22  1917 08/06/22  2211   NTPROBNP 5681* 4512*       Cardiac Imaging and Procedures Review  EKG:  afib on the monitor     Echocardiogram:    8/7/2022  Left Ventricle  Contrast was used to evaluate for thrombus in the left ventricular   apex. No apical thrombus seen. 3 mL of contrast was administered.   Existing IV was used at the right arm. The left ventricle is small in   size. Mild concentric left ventricular hypertrophy. Normal left   ventricular systolic function. The left  ventricular ejection fraction   is estimated to be 55% via [employer]. Apical akinesis. Indeterminate   diastolic function.     Right Ventricle  Normal right ventricular size and systolic function.     Right Atrium  Normal right atrial size. Normal inferior vena cava size and   inspiratory collapse.     Left Atrium  Normal left atrial size. Left atrial volume index is 31 mL/sq m.     Mitral Valve  Thickened mitral valve leaflets. Mild mitral annular calcification. No   mitral stenosis. Trace mitral regurgitation.     Aortic Valve  Trileaflet AV with restricted right an non-coronary cusp leaflets.   Aortic valve sclerosis without stenosis. Mild regurgitation.     Tricuspid Valve  Structurally normal tricuspid valve without significant stenosis. Mild   tricuspid regurgitation. Right ventricular systolic pressure is   estimated to be  mmHg.     Pulmonic Valve  Structurally normal pulmonic valve without significant stenosis. Trace   pulmonic insufficiency.     Pericardium  Small-moderate pericardial effusion without evidence of hemodynamic   compromise.     Aorta  Normal aortic root for body surface area. The ascending aorta diameter   is 2.7 cm.    Cardiac Catheterization:    8/6/2022  IMPRESSION:  1.  Severe one-vessel CAD (LAD) status post unsuccessful attempt at distal LAD revascularization given diffuse nature of severe obstructive CAD throughout the distal LAD with small in caliber distal/apical LAD.  2.  High normal resting LVEDP with no significant transaortic gradient on pullback  3.  Atrial flutter with RVR  4.  Calcified distal right radial and right ulnar arteries     RECOMMENDATIONS:  1. Continue heparin drip for Atrial fibrillation until baseline creatinine clearance is better clarified before transitioning to a DOAC  2. Plavix 75 mg daily, no aspirin given patient on anticoagulation with Plavix and worsening renal function and anemia compared to prior blood work.  3. Consider metoprolol tartrate 12.5 mg p.o.  every 8 hours to help with rate control.  Avoid amiodarone unless absolutely necessary given uncertainty about absence of left atrial appendage thrombus.  Ensure checking baseline LFTs and TSH before starting amiodarone.  4. Plan for BRISA/DCCV if remains in RVR in the morning, n.p.o. after midnight  5. At least 4 hours of bedrest with head of bed elevation < 30 degrees  6. Atorvastatin 40 mg every evening, target LDL<70  7. GDMT for secondary ASCVD prevention  8. Cardiac Rehab referral placed  9. TTE    Assessment/Plan  No new Assessment & Plan notes have been filed under this hospital service since the last note was generated.  Service: Cardiology    1. Anterolateral STEMI:  - unsuccessful revascularization   - continue plavix 75mg qd and atorvastatin 40mg qd   - continue metoprolol XL 25mg qd   - ECHO showed ef 55%    2. Paroxysmal atrial fibrillation:  - continue bb therapy   - high [id number] but with high risk of bleeding due advanced age and frequent falls. Continue plavix as noted above. See dr. alem michaels note     3.  Anemia;  - denies any blood in urine or stool     4/. CKD:  - stable     Future Appointments   Date Time Provider Department Center   8/18/2022  2:00 PM RAQUEL Vizcaino [facility]   9/22/2022  2:45 PM RAQUEL Guy [facility] None     I personally spent a total of 18 minutes which includes face-to-face time and non-face-to-face time spent on preparing to see the patient, reviewing hospital notes and tests, obtaining history from the patient, performing a medically appropriate exam, counseling and educating the patient, ordering medications/tests/procedures/referrals as clinically indicated, and documenting information in the electronic medical record.      Thank you for allowing me to participate in the care of this patient.  Cardiology will sign off on this patient    Please contact me with any questions.    RAQUEL Guy

## 2022-08-09 NOTE — THERAPY
Physical Therapy   Initial Evaluation     Patient Name: Delphine Reeves  Age:  89 y.o., Sex:  female  Medical Record #: [id number]  Today's Date: 2022     Precautions  Precautions: Fall Risk;Cardiac Precautions (See Comments)    Assessment  PT eval order received, chart reviewed, RN cleared pt for PT evaluation. Pt was iD via name and  and was agreeable to PT evaluation. Pt is a 89 y/o F presents on  to the ED for eval of possible STEMI secondary to 3 falls on , hit her head. Dx: AF/AFL with RVR, STEMI, syncope s/p Lt heart cath, coronary angiography, POBA on  by Dr. Mike.  CT head on : (-) acute. Pmh includes but is not limtied to: GERD, HTN, hyperparathyroidism, Lt TKA, falls. Please refer to chart for full details.     PT eval completed. Pt demo good prognosis for therapy and will benefit from continued skilled PT services to improve functional mobility and to maximzie functional level of independecne. Pt presents with generalized weakness ( reported had not gotten OOB since last Friday). She has a FWW /4WSW at home and was encouraged to use. She says her family is wliling to stay with her until she regains her strength, she has very good family support at home and therapist encouraged this.  Spoke with RN re: recommendation for PT eval/mobility order to continue to work on generalized strengtheing, balance and endurance training due to weakness. Reviewed cardiac precautions, activity level after cardiac injury handout issued,, RPE scale. Reports post ambulation RPE scale around a 13.  See grid below for details.    Left pt UIC upon completion, per RN no chair alarm necessary, CLIR, personal needs met, handoff to RN completed, pt agreeable to utilzie call light.       Plan    Recommend Physical Therapy 3 times per week until therapy goals are met for the following treatments:  Bed Mobility, Community Re-integration, Equipment, Gait Training, Neuro Re-Education / Balance, Stair Training,  "Therapeutic Activities, and Therapeutic Exercises    DC Equipment Recommendations: Front-Wheel Walker (pt has a FWW and 4WSW at home if needed, recommended pt utilize and she was agreeable)  Discharge Recommendations: Recommend home health for continued physical therapy services        Objective       08/09/22 0848   Charge Group   PT Evaluation PT Evaluation Low   Total Time Spent   PT Total Time Yes   PT Evaluation Time Spent (Mins) 34   PT Total Time Spent (Calculated) 34   Initial Contact Note    Initial Contact Note Order Received and Verified, Physical Therapy Evaluation in Progress with Full Report to Follow.   Precautions   Precautions Fall Risk;Cardiac Precautions (See Comments)   Vitals   Pulse 100   Patient BP Position Sitting   Blood Pressure  100/60   Pulse Oximetry 95 %   O2 (LPM) 0   O2 Delivery Device None - Room Air   Vitals Comments sitting up in chair   Pain   Intervention Declines   Pain 0 - 10 Group   Pain Rating Scale (NPRS) 0   Therapist Pain Assessment Prior to Activity;0   Prior Living Situation   Prior Services None   Housing / Facility 1 Story House   Steps Into Home 2   Rail None  (pt reports she has a \" board\" on the left hand side that she ussually grabs onto)   Equipment Owned Front-Wheel Walker;4-Wheel Walker   Lives with - Patient's Self Care Capacity Alone and Able to Care For Self   Comments lives alone in a [facility] 2 RAD[facility] 1 \"board\" on the side of the house she grabs onto on the left.  Pt does drive, is retired. has a very supportive family , a son and DIL that are available to assist if needed and live in town. pt reports she can either stay with them or they can stay with her.   Prior Level of Functional Mobility   Bed Mobility Independent   Transfer Status Independent   Ambulation Independent   Distance Ambulation (Feet)   (community level distances)   Assistive Devices Used None   Stairs Independent   Comments PLOF independent owns FWW and 4WSW but does not utilize anymore. " "  History of Falls   History of Falls Yes   Date of Last Fall   (reason for admit)   Cognition    Cognition / Consciousness WDL   Level of Consciousness Alert   Passive ROM Lower Body   Passive ROM Lower Body WDL   Active ROM Lower Body    Active ROM Lower Body  WDL   Strength Lower Body   Lower Body Strength  WDL   Sensation Lower Body   Lower Extremity Sensation   WDL   Lower Body Muscle Tone   Lower Body Muscle Tone  WDL   Strength Upper Body   Upper Body Strength  WDL   Upper Body Muscle Tone   Upper Body Muscle Tone  WDL   Balance Assessment   Sitting Balance (Static) Good   Sitting Balance (Dynamic) Fair +   Standing Balance (Static) Poor   Standing Balance (Dynamic) Poor   Weight Shift Sitting Good   Weight Shift Standing Fair   Comments standing with FWW   Gait Analysis   Gait Level Of Assist Supervised   Assistive Device Front Wheel Walker   Distance (Feet) 40   # of Times Distance was Traveled 1   # of Stairs Climbed 2   Level of Assist with Stairs Supervised   Weight Bearing Status no restirctions   Comments Pt was able to ambulate in room ~ 40' with FWW requiring CSPV for safety. no LOB/buckling however pt very slow to mobilize, distance limited by fatigue. pt reports \" i'm so surprised how weak I am!\".  Negotiated 2 steps but did have to use 2 HR to negotiate, supv for safety. Reports will have family to assist her up/down the stairs if needed   Bed Mobility    Supine to Sit Supervised   Sit to Supine Supervised   Scooting Supervised   Rolling Supervised   Comments supv for safety   Functional Mobility   Sit to Stand Supervised   Bed, Chair, Wheelchair Transfer Supervised   Transfer Method Stand Pivot   Comments supv for safety with FWW   How much difficulty does the patient currently have...   Turning over in bed (including adjusting bedclothes, sheets and blankets)? 3   Sitting down on and standing up from a chair with arms (e.g., wheelchair, bedside commode, etc.) 3   Moving from lying on back to " "sitting on the side of the bed? 3   How much help from another person does the patient currently need...   Moving to and from a bed to a chair (including a wheelchair)? 3   Need to walk in a hospital room? 3   Climbing 3-5 steps with a railing? 3   6 clicks Mobility Score 18   Activity Tolerance   Sitting in Chair left UIC upon completion   Standing stnading with FWW   Patient / Family Goals    Patient / Family Goal #1 \" to get up and walk\"   Short Term Goals    Short Term Goal # 1 Pt will ambulate to distances of 150' or greater with or without LRAD with supv within 6 visits in order to ambulate at community level distances   Education Group   Education Provided Role of Physical Therapist;Cardiac Precautions   Cardiac Precautions Patient Response Patient;Eager;Acceptance;Explanation;Demonstration;Verbal Demonstration   Role of Physical Therapist Patient Response Patient;Acceptance;Eager;Explanation;Verbal Demonstration   Additional Comments reviewed activity after cardiac injury, RPE scale   Problem List    Problems Impaired Bed Mobility;Impaired Transfers;Impaired Ambulation;Impaired Balance;Impaired Coordination;Decreased Activity Tolerance   Anticipated Discharge Equipment and Recommendations   DC Equipment Recommendations Front-Wheel Walker  (pt has a FWW and 4WSW at home if needed, recommended pt utilize and she was agreeable)   Discharge Recommendations Recommend home health for continued physical therapy services   Interdisciplinary Plan of Care Collaboration   IDT Collaboration with  Nursing   Patient Position at End of Therapy Seated;Tray Table within Reach;Phone within Reach;Family / Friend in Room;Call Light within Reach  (per RN no chair alarm necessary)   Collaboration Comments handoff to RN completed   Session Information   Date / Session Number  8/9-1(1/3,8/15)     "

## 2022-08-09 NOTE — DISCHARGE PLANNING
Meds-to-Beds: Discharge prescription orders listed below delivered to patient's bedside. SEAN Kirkland notified. Patient counseled. Patient elected to have co-payment billed to patient account.      Current Outpatient Medications   Medication Sig Dispense Refill   • atorvastatin (LIPITOR) 40 MG Tab Take 1 Tablet by mouth at bedtime. 30 Tablet 0   • [START ON 8/10/2022] clopidogrel (PLAVIX) 75 MG Tab Take 1 Tablet by mouth every day. 90 Tablet 0   • [START ON 8/10/2022] metoprolol SR (TOPROL XL) 25 MG TABLET SR 24 HR Take 1 Tablet by mouth every day. 30 Tablet 0   • [START ON 8/10/2022] omeprazole (PRILOSEC) 20 MG delayed-release capsule Take 1 Capsule by mouth every day. 30 Capsule 0      Teresa Gary, PharmD

## 2022-08-09 NOTE — PROGRESS NOTES
Tele monitoring notified RN that patient converted to atrial fibrillation. -118. Patient not symptomatic. LUIS CARLOS Mann notified.

## 2022-08-09 NOTE — DISCHARGE INSTRUCTIONS
Diagnosis:  Acute Coronary Syndrome (ACS) is a diagnosis that encompasses cardiac-related chest pain and heart attack. ACS occurs when the blood flow to the heart muscle is severely reduced or cut off completely due to a slow process called atherosclerosis.  Atherosclerosis is a disease in which the coronary arteries become narrow from a buildup of fat, cholesterol, and other substances that combine to form plaque. If the plaque breaks, a blood clot will form and block the blood flow to the heart muscle. This lack of blood flow can cause damage or death to the heart muscle which is called a heart attack or Myocardial Infarction (MI). There are two different types of MIs:  ST Elevation Myocardial Infarction or STEMI (the most severe type of heart attack) and Non-ST Elevation Myocardial Infarction or NSTEMI.    Treatment Plan:  Cardiac Diet  - Low fat, low salt, low cholesterol   Cardiac Rehab  - Your doctor has ordered you a referral to [facility].  Call [phone] to schedule an appointment.  Attend my follow-up appointment with my Cardiologist.  Take my medications as prescribed by my doctor  Exercise daily  Lower my bad cholesterol and raise my good cholesterol, lower my blood pressure, and Reduce stress    Medications:  Certain medications are used to treat ACS.  Remember to always take medications as prescribed and never stop talking medications unless told by your doctor.    You have been prescribed the following medicatons:    Aspirin - Aspirin is used as a blood thinning medication and you will require this medication indefinitely.  Anti-platelet/blood thinner - Your Anti-platelet/Blood thinning medication is called CLOPIDOGREL, and is used in combination with aspirin to prevent clots from forming in your heart and/or around your stent.  Your doctor will determine how long you need to be on this medicine.  Beta-Blocker - Beta-Blocker METOPROLOL is used to lower blood pressure and heart rate, and/or helps your  heart heal after a heart attack.  Statin - Statin ATORVASTATIN is used to lower cholesterol.

## 2022-08-09 NOTE — DISCHARGE PLANNING
Received Choice form at 1228  Agency/Facility Name: [facility]   Referral sent per Choice form @ [phone]  Agency/Facility Name: [facility]   Spoke To: Tino   Outcome: Pt accepted to service.

## 2022-08-09 NOTE — PROGRESS NOTES
Cardiology Follow-up Consult Note    Date of Service:    8/8/2022      Consulting Physician: Francesco Jaeger M.D.    Name:   Delphine Reeves   YOB: 1930  Age:   89 y.o.  female   MRN:   [id number]        Subjective:  No acute events overnight.  Has not gotten out of bed.  No current chest pain or pressure or lightheadedness/dizziness.  Daughter-in-law at bedside.      All other review of systems reviewed and negative.      Past medical, surgical, social, and family history reviewed and unchanged from admission except as noted in assessment and plan.    Medications: Reviewed in MAR      No Known Allergies      Intake/Output Summary (Last 24 hours) at 8/8/2022 1700  Last data filed at 8/8/2022 0800  Gross per 24 hour   Intake 1160 ml   Output 200 ml   Net 960 ml        Physical Exam  Body mass index is 24.63 kg/m².  /57   Pulse (!) 56   Temp 36.5 °C (97.7 °F) (Temporal)   Resp 16   Wt 57.2 kg (126 lb 1.7 oz)   SpO2 95%   Vitals:    08/08/22 1200 08/08/22 1300 08/08/22 1507 08/08/22 1620   BP: 101/74 119/58 107/53 119/57   Pulse: (!) 54 (!) 51 61 (!) 56   Resp: (!) 28 (!) 27 17 16   Temp: 36.4 °C (97.5 °F)  36.1 °C (97 °F) 36.5 °C (97.7 °F)   TempSrc: Temporal  Temporal Temporal   SpO2: 95%  95% 95%   Weight:         Oxygen Therapy:  Pulse Oximetry: 95 %, O2 (LPM): 0, O2 Delivery Device: None - Room Air    General: Well appearing and in no apparent distress  Eyes: nl conjunctiva  ENT: OP clear  Neck: JVP not elevated, no carotid bruits  Lungs: normal respiratory effort, CTAB  Heart: RRR, systolic murmur, no rubs or gallops, no edema bilateral lower extremities. No LV/RV heave on cardiac palpatation. 2+ bilateral radial pulses.  2+ bilateral dp pulses.   Abdomen: soft, non tender, non distended, no masses, normal bowel sounds.  No HSM.  Extremities/MSK: no clubbing, no cyanosis  Neurological: No focal sensory deficits  Psychiatric: Appropriate affect, A/O x 3  Skin: Warm extremities    Labs  (personally reviewed and notable for):   Lab Results   Component Value Date/Time    SODIUM 138 08/08/2022 01:33 AM    POTASSIUM 3.8 08/08/2022 01:33 AM    CHLORIDE 107 08/08/2022 01:33 AM    CO2 17 (L) 08/08/2022 01:33 AM    GLUCOSE 118 (H) 08/08/2022 01:33 AM    BUN 32 (H) 08/08/2022 01:33 AM    CREATININE 1.76 (H) 08/08/2022 01:33 AM      Lab Results   Component Value Date/Time    WBC 6.8 08/08/2022 01:33 AM    RBC 2.94 (L) 08/08/2022 01:33 AM    HEMOGLOBIN 9.0 (L) 08/08/2022 01:33 AM    HEMATOCRIT 27.6 (L) 08/08/2022 01:33 AM    MCV 93.9 08/08/2022 01:33 AM    MCH 30.6 08/08/2022 01:33 AM    MCHC 32.6 (L) 08/08/2022 01:33 AM    MPV 12.1 08/08/2022 01:33 AM    NEUTSPOLYS 66.20 08/08/2022 01:33 AM    LYMPHOCYTES 20.30 (L) 08/08/2022 01:33 AM    MONOCYTES 12.90 08/08/2022 01:33 AM    EOSINOPHILS 0.10 08/08/2022 01:33 AM    BASOPHILS 0.10 08/08/2022 01:33 AM      Lab Results   Component Value Date/Time    CHOLSTRLTOT 94 (L) 08/07/2022 01:24 AM    LDL 43 08/07/2022 01:24 AM    HDL 43 08/07/2022 01:24 AM    TRIGLYCERIDE 41 08/07/2022 01:24 AM       Lab Results   Component Value Date/Time    TROPONINT 1498 (H) 08/07/2022 0124     Lab Results   Component Value Date/Time    NTPROBNP 4512 (H) 08/06/2022 2211       Cardiac Imaging and Procedures Review:    24-hour telemetry personally reviewed which shows atrial fibrillation, self converted to sinus rhythm earlier today    Cardiac catheterization procedure 8/6/2022:  CORONARY ANGIOGRAPHY:  1. The left main coronary artery : Normal appearing, bifurcates to LAD and left circumflex  2. The left anterior descending coronary artery: Calcified vessel, mild 30 to 40% proximal stenosis, 100% occluded distal LAD with CARROLL 0 flow, and diffuse 50 to 70% stenoses throughout the mid/distal LAD after the takeoff of the diagonal branch.   3. The left circumflex coronary artery : Diffuse luminal irregularities, circumflex gives off an OM1 and a bifurcating OM 2 before it tapers into the AV  groove.  4. The right coronary artery: Dominant vessel calcified proximal and mid RCA, mild 30 to 40% stenosis in the proximal segment.    IMPRESSION:  1.  Severe one-vessel CAD (LAD) status post unsuccessful attempt at distal LAD revascularization given diffuse nature of severe obstructive CAD throughout the distal LAD with small in caliber distal/apical LAD.  2.  High normal resting LVEDP with no significant transaortic gradient on pullback  3.  Atrial flutter with RVR  4.  Calcified distal right radial and right ulnar arteries    Radiology test Review:  EC-ECHOCARDIOGRAM COMPLETE W/ CONT   Final Result      CT-HEAD W/O   Final Result         1.  No evidence of acute intracranial hemorrhage or mass lesion.      2. Cerebral atrophy.               DX-CHEST-PORTABLE (1 VIEW)   Final Result         No pneumothorax identified.      DX-CHEST-LIMITED (1 VIEW)   Final Result         Lucency along the left chest wall likely represents skinfold versus pneumothorax. Recommend repeat chest x-ray.      This was discussed with Dr. Claire at 7:40 PM.      CL-LEFT HEART CATHETERIZATION WITH POSSIBLE INTERVENTION    (Results Pending)       Problem list:  Principal Problem:    STEMI (ST elevation myocardial infarction) (HCC) POA: Yes  Active Problems:    Essential hypertension POA: Yes    Gastroesophageal reflux disease without esophagitis POA: Yes    Stage 3b chronic kidney disease (HCC) POA: Yes    Paroxysmal A-fib (HCC) POA: Yes    Other specified anemias POA: Yes    Syncope POA: Yes  Resolved Problems:    * No resolved hospital problems. *      Impression and Medical Decision Making:  #Anterolateral STEMI with unsuccessful revascularization  #Acute anemia combination of blood loss along with iron deficiency  #Thrombocytopenia  #Recurrent syncope likely 2/2 orthostatic hypotension  #Paroxysmal atrial fibrillation    Recommendations:  -- Had an extensive discussion with the patient and her daughter-in-law who is present at bedside  regarding risks versus benefits of initiating oral anticoagulation for paroxysmal A. fib and advanced age/acute anemia and FOB positive.  Is currently on single antiplatelet therapy with Plavix 75 mg with stable hemoglobin.  Continue monitoring for at least 1 more day.  No need to initiate aspirin as she did not receive a drug-eluting stent.  -- After discussion, patient and daughter-in-law are in favor to defer initiating oral anticoagulation given high risk of bleed with recurrent falls, advanced age and CKD understanding the risk of potential stroke with A. fib.  -- Vital signs stable on metoprolol 12.5 mg twice daily.  Transition to Toprol-XL 25 mg daily.  -- Continue high intensity statin with Lipitor 40 mg daily.  -- For deconditioning, recommend PT/OT evaluation.  Patient lives alone, may benefit from SNF if she qualifies for it.    Recommendations discussed with Dr. Jaeger.    Thank you for allowing me to participate in the care of this patient, cardiology will continue to follow.  Please contact me with any questions.    Hao Mendoza M.D.  Cardiologist, [facility] [place]   [phone]    Please note that this dictation was created using voice recognition software. I have made every reasonable attempt to correct obvious errors, but it is possible there are errors of grammar and possibly content that I did not discover before finalizing the note.

## 2022-08-09 NOTE — DISCHARGE PLANNING
"Case Management Discharge Planning    Admission Date: 8/6/2022  GMLOS: 2.5  ALOS: 3    6-Clicks ADL Score:    6-Clicks Mobility Score: 18      Anticipated Discharge Dispo:      DME Needed: No    Action(s) Taken: OTHER    Escalations Completed: None    Medically Clear: Yes    Next Steps: Team met to review status and discharge planning. PTA the patient resided home and had family members \" stop in\" to check on her. Patient has a FWW at home but does not use. Discharge recommendations have been discussed. Patient was agreeable to HH and selected [facility]. Family ( DIL) inquired in regards to community services available. SW discussed  HH and private homecare. Patient does not want to go to an group home or . All options were discussed, family acknowledged understanding. [facility] will follow in the community.     Barriers to Discharge: None    Is the patient up for discharge tomorrow: No          "

## 2022-08-09 NOTE — CARE PLAN
The patient is Stable - Low risk of patient condition declining or worsening    Shift Goals  Clinical Goals: work with pt/ot  Patient Goals: Sleep  Family Goals: TANIA    Problem: Knowledge Deficit - Standard  Goal: Patient and family/care givers will demonstrate understanding of plan of care, disease process/condition, diagnostic tests and medications  Outcome: Progressing     Problem: Skin Integrity  Goal: Skin integrity is maintained or improved  Outcome: Progressing

## 2022-08-09 NOTE — FACE TO FACE
Face to Face Supporting Documentation - Home Health    The encounter with this patient was in whole or in part the primary reason for home health admission.    Date of encounter:   Patient:                    MRN:                       YOB: 2022  Delphine Reeves  [id number]  5/6/1930     Home health to see patient for:  Skilled Nursing care for assessment, interventions & education, Physical Therapy evaluation and treatment and Occupational therapy evaluation and treatment    Skilled need for:  Comment: medication managment     Skilled nursing interventions to include:  Comment: medication managment     Homebound status evidenced by:  Need the aid of supportive devices such as crutches, canes, wheelchairs or walkers. Leaving home requires a considerable and taxing effort. There is a normal inability to leave the home.    Community Physician to provide follow up care: RAQUEL Vizcaino     Optional Interventions? No      I certify the face to face encounter for this home health care referral meets the CMS requirements and the encounter/clinical assessment with the patient was, in whole, or in part, for the medical condition(s) listed above, which is the primary reason for home health care. Based on my clinical findings: the service(s) are medically necessary, support the need for home health care, and the homebound criteria are met.  I certify that this patient has had a face to face encounter by myself.  Tristan Portillo M.D. - KETANI: [phone]

## 2022-08-10 NOTE — DISCHARGE SUMMARY
Discharge Summary    CHIEF COMPLAINT ON ADMISSION  No chief complaint on file.      Reason for Admission  Stemi     Admission Date  8/6/2022    CODE STATUS  Prior    HPI & HOSPITAL COURSE  This is a 89 y.o. female 89 y.o. female who presented 8/6/2022 with a new STEMI.  Patient has a PMH of HTN, GERD, hx of knee replacement, never smoker and came to the ED via EMS on 8/6/2022 after presenting 3 different falls with head trauma earlier on the same day concerning for syncopal events. She came as a code STEMI response and was given ASA, and nitro. EKG showed lateral STEMI but was reported that ST segment improved on nitro.  Her case was discussed with cardiology Dr Hodgse who recommended LHC and PCI after conversation with the family of the patient.   At the ED she was found with hypotension that was intermitent and on A fib with RVR which surprisely was asymptomatic. Her initial workup included a negative head CT, elevated troponins into 1694 range. The LHC showed a 100% blockage of distal LAD that was not amenable to balloon angioplasty, no stents placed. Patient admitted to the ICU post cath for close monitoring. She was then transferred out of ICU to the floor. She was continued on Plavix. Cardiology had a discussion with patient daughter in law regarding risks versus benefits of initiating oral anticoagulation for paroxysmal A. fib and advanced age/acute anemia and FOB positive. After discussion, patient and daughter-in-law are in favor to defer initiating oral anticoagulation given high risk of bleed with recurrent falls, advanced age and CKD understanding the risk of potential stroke with A. fib.  Patient was also seen by physical therapy while in the hospital and home health  was recommended and has been arranged for patient.Patient was also counseled not to drive as she had syncopal episodes in the past and falls.   Patient will be discharged home today and will follow up with cardiology as outpatient    The  patient met 2-midnight criteria for an inpatient stay at the time of discharge.    Discharge Date  8/9/2022    FOLLOW UP ITEMS POST DISCHARGE  Cardiology     DISCHARGE DIAGNOSES  Principal Problem:    STEMI (ST elevation myocardial infarction) (HCC) POA: Yes  Active Problems:    Essential hypertension POA: Yes    Gastroesophageal reflux disease without esophagitis POA: Yes    Stage 3b chronic kidney disease (HCC) POA: Yes    Paroxysmal A-fib (HCC) POA: Yes    Other specified anemias POA: Yes    Syncope POA: Yes  Resolved Problems:    * No resolved hospital problems. *      FOLLOW UP  Future Appointments   Date Time Provider Department Center   8/18/2022  2:00 PM RAQUEL Vizcaino [facility]   9/22/2022  2:45 PM RAQUEL [facility] None     [facility]  [address]  [address]  [facility] [id number] [phone]  Call  Your doctor has referred you for Cardiac Rehab, which is important for your recovery. Please call to make an appointment.    [facility] [address] [id number] [phone]        RAQUEL Vizcaino  [address]  [facility] [address] [phone]          MEDICATIONS ON DISCHARGE     Medication List        START taking these medications        Instructions   clopidogrel 75 MG Tabs  Commonly known as: PLAVIX   Take 1 Tablet by mouth every day.  Dose: 75 mg     metoprolol SR 25 MG Tb24  Commonly known as: TOPROL XL   Take 1 Tablet by mouth every day.  Dose: 25 mg     omeprazole 20 MG delayed-release capsule  Commonly known as: PRILOSEC   Take 1 Capsule by mouth every day.  Dose: 20 mg            CHANGE how you take these medications        Instructions   atorvastatin 40 MG Tabs  What changed:   medication strength  how much to take  when to take this  Commonly known as: LIPITOR   Take 1 Tablet by mouth at bedtime.  Dose: 40 mg            CONTINUE taking these medications        Instructions   B  COMPLEX-VITAMIN C PO   Take  by mouth.     famotidine 40 MG Tabs  Commonly known as: PEPCID   Doctor's comments: Requesting 1 year supply  TAKE 1 TABLET BY MOUTH  DAILY     Sensipar 60 MG Tabs  Generic drug: Cinacalcet HCl   Take 1 Tab by mouth every Monday, Wednesday, and Friday.  Dose: 1 Tablet     Vitamin D-3 125 MCG (5000 UT) Tabs   Take  by mouth every Monday, Wednesday, and Friday.            STOP taking these medications      amLODIPine 2.5 MG Tabs  Commonly known as: NORVASC     losartan 50 MG Tabs  Commonly known as: COZAAR              Allergies  No Known Allergies    DIET  No orders of the defined types were placed in this encounter.      ACTIVITY  As tolerated.  Weight bearing as tolerated    CONSULTATIONS  Cardiology     PROCEDURES  Cardiac cath     LABORATORY  Lab Results   Component Value Date    SODIUM 141 08/09/2022    POTASSIUM 3.7 08/09/2022    CHLORIDE 111 08/09/2022    CO2 18 (L) 08/09/2022    GLUCOSE 97 08/09/2022    BUN 26 (H) 08/09/2022    CREATININE 1.49 (H) 08/09/2022        Lab Results   Component Value Date    WBC 7.6 08/09/2022    HEMOGLOBIN 8.3 (L) 08/09/2022    HEMATOCRIT 26.0 (L) 08/09/2022    PLATELETCT 125 (L) 08/09/2022        Total time of the discharge process exceeds 41 minutes.

## 2022-08-10 NOTE — PROGRESS NOTES
Patient is A&Ox4. Discharge instructions. Personal belongings in possession with patient. PIV and tele monitor removed. Copy of discharge instructions in patient chart, signed and reviewed. Patient verbalizes the understanding of the discharge instructions. Questions and concerns addressed prior to leaving the unit. Transported via wheelchairby RN. Patient discharged to home. Family present.

## 2022-08-11 ENCOUNTER — PATIENT OUTREACH (OUTPATIENT)
Dept: MEDICAL GROUP | Facility: PHYSICIAN GROUP | Age: 87
End: 2022-08-11
Payer: MEDICARE

## 2022-08-11 NOTE — PROGRESS NOTES
Subjective:     Delphine Reeves is a 89 y.o. female who presents for Hospital Follow-up.    POST DISCHARGE CALL:  Discharge Date:8/9/2022   Date of Outreach Call: 8/11/2022 11:34 AM  Now that you're home, how are you doing? Good  Do you have questions about your medications? No  Did you fill your medications? Yes  Do you have a follow-up appointment scheduled?Yes  Discharging Department: Telemetry 7  Number of Attempts: 1  Current or previous attempts completed within two business days of discharge? Yes  Provided education regarding treatment plan, medication, self-management, ADLs? Yes  Has patient completed Advance Directive? If yes, advise them to bring to appointment. Yes  Care Manager phone number provided? Yes  Is there anything else I can help you with? No    HPI:   Recently hospitalized for ***    Current medicines (including reconciliation performed today)  Current Outpatient Medications   Medication Sig Dispense Refill    atorvastatin (LIPITOR) 40 MG Tab Take 1 Tablet by mouth at bedtime. 30 Tablet 0    clopidogrel (PLAVIX) 75 MG Tab Take 1 Tablet by mouth every day. 90 Tablet 0    metoprolol SR (TOPROL XL) 25 MG TABLET SR 24 HR Take 1 Tablet by mouth every day. 30 Tablet 0    omeprazole (PRILOSEC) 20 MG delayed-release capsule Take 1 Capsule by mouth every day. 30 Capsule 0    famotidine (PEPCID) 40 MG Tab TAKE 1 TABLET BY MOUTH  DAILY 90 Tablet 3    B Complex-C (B COMPLEX-VITAMIN C PO) Take  by mouth.      SENSIPAR 60 MG Tab Take 1 Tab by mouth every Monday, Wednesday, and Friday.  7    Cholecalciferol (VITAMIN D-3) 5000 units Tab Take  by mouth every Monday, Wednesday, and Friday.       No current facility-administered medications for this visit.       Allergies:   Patient has no known allergies.    Social History     Tobacco Use    Smoking status: Never    Smokeless tobacco: Never   Vaping Use    Vaping Use: Never used   Substance Use Topics    Alcohol use: No     Comment: holidays    Drug use: No        ROS:  ***    Objective:     There were no vitals filed for this visit.  There is no height or weight on file to calculate BMI.    Physical Exam:  ***    Assessment and Plan:   There are no diagnoses linked to this encounter.    - Chart and discharge summary were reviewed.   - Hospitalization and results reviewed with patient.   - Medications reviewed including instructions regarding high risk medications, dosing and side effects.  - Recommended Services: {COORDINATION OF SERVICES:20405}  - Advance directive/POLST on file?  {Yes/No:20266}    Follow-up:No follow-ups on file.    Face-to-face transitional care management services with {TCM Complexity:80545} medical decision complexity were provided.     LACE+ Historical Score Over Time (0-28: Low, 29-58: Medium, 59+: High): 66      {                                                      reference text will not save to note  Coding guide   36745        - face-to-face within 14 day        - moderate decision complexity (LACE+ score of 28-58)  28017       - face-to-face within 7 days       - high medical decision complexity (LACE+ score 59+)    :71514}

## 2022-08-11 NOTE — PROGRESS NOTES
Spoke to yenni's son Que. He said she is doing good and home health is there now with her. Changed patients previous appointment to be for hospital follow up on 8/17 with PCP for TCM. No questions about medications or discharge instructions at this time.

## 2022-08-17 ENCOUNTER — APPOINTMENT (OUTPATIENT)
Dept: MEDICAL GROUP | Facility: PHYSICIAN GROUP | Age: 87
End: 2022-08-17
Payer: MEDICARE

## 2022-08-22 ENCOUNTER — APPOINTMENT (OUTPATIENT)
Dept: MEDICAL GROUP | Facility: PHYSICIAN GROUP | Age: 87
End: 2022-08-22
Payer: MEDICARE

## 2022-09-01 ENCOUNTER — OFFICE VISIT (OUTPATIENT)
Dept: MEDICAL GROUP | Facility: PHYSICIAN GROUP | Age: 87
End: 2022-09-01
Payer: MEDICARE

## 2022-09-01 VITALS
WEIGHT: 122 LBS | HEART RATE: 60 BPM | BODY MASS INDEX: 23.95 KG/M2 | DIASTOLIC BLOOD PRESSURE: 64 MMHG | OXYGEN SATURATION: 98 % | SYSTOLIC BLOOD PRESSURE: 164 MMHG | HEIGHT: 60 IN | TEMPERATURE: 97.6 F

## 2022-09-01 DIAGNOSIS — I21.02 ST ELEVATION MYOCARDIAL INFARCTION INVOLVING LEFT ANTERIOR DESCENDING (LAD) CORONARY ARTERY (HCC): ICD-10-CM

## 2022-09-01 DIAGNOSIS — I48.0 PAROXYSMAL A-FIB (HCC): ICD-10-CM

## 2022-09-01 DIAGNOSIS — E78.00 PURE HYPERCHOLESTEROLEMIA: ICD-10-CM

## 2022-09-01 DIAGNOSIS — I10 ESSENTIAL HYPERTENSION: ICD-10-CM

## 2022-09-01 DIAGNOSIS — Z09 HOSPITAL DISCHARGE FOLLOW-UP: ICD-10-CM

## 2022-09-01 DIAGNOSIS — N18.32 STAGE 3B CHRONIC KIDNEY DISEASE: ICD-10-CM

## 2022-09-01 PROCEDURE — 99215 OFFICE O/P EST HI 40 MIN: CPT | Performed by: NURSE PRACTITIONER

## 2022-09-01 RX ORDER — ATORVASTATIN CALCIUM 40 MG/1
40 TABLET, FILM COATED ORAL
Qty: 90 TABLET | Refills: 1 | Status: SHIPPED | OUTPATIENT
Start: 2022-09-01 | End: 2023-01-23

## 2022-09-01 RX ORDER — METOPROLOL SUCCINATE 25 MG/1
25 TABLET, EXTENDED RELEASE ORAL DAILY
Qty: 90 TABLET | Refills: 0 | Status: SHIPPED | OUTPATIENT
Start: 2022-09-01 | End: 2022-11-07

## 2022-09-01 RX ORDER — ATORVASTATIN CALCIUM 40 MG/1
40 TABLET, FILM COATED ORAL
Qty: 90 TABLET | Refills: 1 | Status: SHIPPED | OUTPATIENT
Start: 2022-09-01 | End: 2022-09-01

## 2022-09-01 RX ORDER — AMLODIPINE BESYLATE 2.5 MG/1
2.5 TABLET ORAL DAILY
Qty: 30 TABLET | Refills: 2 | Status: SHIPPED | OUTPATIENT
Start: 2022-09-01 | End: 2022-09-01

## 2022-09-01 RX ORDER — OMEPRAZOLE 20 MG/1
20 CAPSULE, DELAYED RELEASE ORAL DAILY
Qty: 90 CAPSULE | Refills: 0 | Status: SHIPPED | OUTPATIENT
Start: 2022-09-01 | End: 2022-09-01

## 2022-09-01 RX ORDER — METOPROLOL SUCCINATE 25 MG/1
25 TABLET, EXTENDED RELEASE ORAL DAILY
Qty: 90 TABLET | Refills: 0 | Status: SHIPPED | OUTPATIENT
Start: 2022-09-01 | End: 2022-09-01

## 2022-09-01 RX ORDER — AMLODIPINE BESYLATE 2.5 MG/1
2.5 TABLET ORAL DAILY
Qty: 30 TABLET | Refills: 2 | Status: SHIPPED | OUTPATIENT
Start: 2022-09-01 | End: 2022-09-08

## 2022-09-01 RX ORDER — OMEPRAZOLE 20 MG/1
20 CAPSULE, DELAYED RELEASE ORAL DAILY
Qty: 90 CAPSULE | Refills: 0 | Status: SHIPPED | OUTPATIENT
Start: 2022-09-01 | End: 2022-11-07

## 2022-09-01 ASSESSMENT — FIBROSIS 4 INDEX: FIB4 SCORE: 3.29

## 2022-09-01 NOTE — ASSESSMENT & PLAN NOTE
She continues with atorvastatin 40 mg at bedtime.  She has been tolerating medication.  She does need a medication refill today.

## 2022-09-01 NOTE — PROGRESS NOTES
Subjective:     CC: Hospital discharge follow-up    HPI:   Delphine presents today with daughter-in-law Kathi the following:    Essential hypertension  Her initial blood pressure today is 162/70.  Patient had recent STEMI with hypotension and her amlodipine and losartan doses were stopped.  She is currently taking metoprolol SR 25 mg daily. She has seen home PT and home RN and reports her blood pressures were good. She is using a wrist cuff at home. She does sit for a couple of minutes before checking her blood pressure and states that she does hold her arm up in the air when she takes her blood pressure.  She does bring in her home blood pressure log today which shows her blood pressures have been ranging 130-150/'s.  She denies any chest pain, shortness of breath, dizziness, blurry vision or headaches.  She does need some medication refills today.      Hospital discharge follow-up  Patient presented to the emergency room on 8/6/2022 for possible STEMI secondary to 3 falls prior to arrival.  She did admit that she hit her head.  She was brought to the emergency room by ambulance.  EMS found that she was in A. fib and did give her full dose aspirin and nitro sublingually.  In the emergency room her blood pressure was low, troponin was elevated at 1694.  EKG in the emergency room did show sinus tachycardia with STEMI, CT of her head was negative for any acute bleeds.  She is a DNR/DNI.  She was given 1 L saline bolus for her low blood pressure.  Cardiology was consulted and patient and her family were agreeable to continue with angiogram with possible PCI.  She did have a left heart catheterization that did show 100% blockage of distal LAD that was not able to have balloon angioplasty, no stents were placed.  She was in the ICU for post catheter monitoring.  She was placed on Plavix 25 mg daily.  She was discharged home with Plavix, metoprolol 25 mg daily, omeprazole 20 mg daily and atorvastatin 40 mg at bedtime.   She does have an upcoming appointment with cardiology on 10/10/2022. She states that she feels good. She has not had any more falls. She is using a walker. She has PT coming 2x a week and home health nurse was seeing her but has recently stopped.     Stage 3b chronic kidney disease (HCC)  She does have an upcoming appointment with Dr. Hernandez.  Blood pressure elevated today. Recent labs and hospitalization shows GFR 20-30's.    Paroxysmal A-fib (HCC)  She continues with metoprolol SR 25 mg daily and Plavix 75 mg daily.  She has an upcoming cardiology appointment scheduled in October.  She does need medication refills today.  No new chest pain, shortness of breath, falls or palpitations.    Pure hypercholesterolemia  She continues with atorvastatin 40 mg at bedtime.  She has been tolerating medication.  She does need a medication refill today.        Past Medical History:   Diagnosis Date    GERD (gastroesophageal reflux disease)     Hyperparathyroidism (HCC)     Hypertension        Social History     Tobacco Use    Smoking status: Never    Smokeless tobacco: Never   Vaping Use    Vaping Use: Never used   Substance Use Topics    Alcohol use: No     Comment: holidays    Drug use: No       Current Outpatient Medications Ordered in Epic   Medication Sig Dispense Refill    amLODIPine (NORVASC) 2.5 MG Tab Take 1 Tablet by mouth every day. 30 Tablet 2    atorvastatin (LIPITOR) 40 MG Tab Take 1 Tablet by mouth at bedtime. 90 Tablet 1    metoprolol SR (TOPROL XL) 25 MG TABLET SR 24 HR Take 1 Tablet by mouth every day. 90 Tablet 0    omeprazole (PRILOSEC) 20 MG delayed-release capsule Take 1 Capsule by mouth every day. 90 Capsule 0    clopidogrel (PLAVIX) 75 MG Tab Take 1 Tablet by mouth every day. 90 Tablet 0     No current Epic-ordered facility-administered medications on file.       Allergies:  Patient has no known allergies.    Health Maintenance: Deferred      Objective:     Vital signs reviewed  Exam:  BP (!) 164/64 (BP  Location: Right arm, Patient Position: Sitting)   Pulse 60   Temp 36.4 °C (97.6 °F) (Temporal)   Ht 1.524 m (5')   Wt 55.3 kg (122 lb)   SpO2 98%   BMI 23.83 kg/m²  Body mass index is 23.83 kg/m².    Gen: Alert and oriented, No apparent distress.  Daughter not present in exam room.  Lungs: Normal effort, CTA bilaterally, no wheezes, rhonchi, or rales  CV: Regular rate and irregular rhythm with systolic murmur.  No rubs or gallops.  Ext: No clubbing, cyanosis, edema.  Using four-wheel walker with ambulation      Assessment & Plan:     89 y.o. female with the following -     1. Hospital discharge follow-up  Acute complicated problem.  This was a complex hospital discharge follow-up.  Extensive time today was spent reviewing her chart, imaging, progress notes today.    2. Essential hypertension  Chronic exacerbated problem.  On repeat by me her blood pressure is still elevated 164/64.  I did check both arms and they are the same.  She will continue with her metoprolol 25 mg daily.  I am restarting her amlodipine 2.5 mg daily.  She continue with blood pressure monitoring and return in 1 week for follow-up.  Keep upcoming cardiology appointment.  - amLODIPine (NORVASC) 2.5 MG Tab; Take 1 Tablet by mouth every day.  Dispense: 30 Tablet; Refill: 2    3. Stage 3b chronic kidney disease (HCC)  Chronic stable problem.  Recommend she keep upcoming nephrology appointment.  Plan to check updated labs at next appointment.    4. Paroxysmal A-fib (HCC)  Chronic stable problem.  She will like a medication refill today.  To continue with her metoprolol SR 25 mg daily and omeprazole 20 mg daily.  Keep upcoming cardiology appointment.  - metoprolol SR (TOPROL XL) 25 MG TABLET SR 24 HR; Take 1 Tablet by mouth every day.  Dispense: 90 Tablet; Refill: 0  - omeprazole (PRILOSEC) 20 MG delayed-release capsule; Take 1 Capsule by mouth every day.  Dispense: 90 Capsule; Refill: 0    5. ST elevation myocardial infarction involving left  anterior descending (LAD) coronary artery (HCC)  Chronic stable problem.  Keep upcoming cardiology appointment.  Continue with atorvastatin 40 mg at bedtime.  Recent labs completed during hospitalization.  - atorvastatin (LIPITOR) 40 MG Tab; Take 1 Tablet by mouth at bedtime.  Dispense: 90 Tablet; Refill: 1    6. Pure hypercholesterolemia  Chronic stable problem.  Continue with atorvastatin 40 mg at bedtime.    My total time spent caring for the patient on the day of the encounter was 40 minutes.   This does not include time spent on separately billable procedures/tests.      Return in about 1 week (around 9/8/2022) for Hypertension.    Please note that this dictation was created using voice recognition software. I have made every reasonable attempt to correct obvious errors, but I expect that there are errors of grammar and possibly content that I did not discover before finalizing the note.

## 2022-09-01 NOTE — ASSESSMENT & PLAN NOTE
She continues with metoprolol SR 25 mg daily and Plavix 75 mg daily.  She has an upcoming cardiology appointment scheduled in October.  She does need medication refills today.  No new chest pain, shortness of breath, falls or palpitations.

## 2022-09-01 NOTE — ASSESSMENT & PLAN NOTE
Patient presented to the emergency room on 8/6/2022 for possible STEMI secondary to 3 falls prior to arrival.  She did admit that she hit her head.  She was brought to the emergency room by ambulance.  EMS found that she was in A. fib and did give her full dose aspirin and nitro sublingually.  In the emergency room her blood pressure was low, troponin was elevated at 1694.  EKG in the emergency room did show sinus tachycardia with STEMI, CT of her head was negative for any acute bleeds.  She is a DNR/DNI.  She was given 1 L saline bolus for her low blood pressure.  Cardiology was consulted and patient and her family were agreeable to continue with angiogram with possible PCI.  She did have a left heart catheterization that did show 100% blockage of distal LAD that was not able to have balloon angioplasty, no stents were placed.  She was in the ICU for post catheter monitoring.  She was placed on Plavix 25 mg daily.  She was discharged home with Plavix, metoprolol 25 mg daily, omeprazole 20 mg daily and atorvastatin 40 mg at bedtime.  She does have an upcoming appointment with cardiology on 10/10/2022. She states that she feels good. She has not had any more falls. She is using a walker. She has PT coming 2x a week and home health nurse was seeing her but has recently stopped.

## 2022-09-01 NOTE — ASSESSMENT & PLAN NOTE
Her initial blood pressure today is 162/70.  Patient had recent STEMI with hypotension and her amlodipine and losartan doses were stopped.  She is currently taking metoprolol SR 25 mg daily. She has seen home PT and home RN and reports her blood pressures were good. She is using a wrist cuff at home. She does sit for a couple of minutes before checking her blood pressure and states that she does hold her arm up in the air when she takes her blood pressure.  She does bring in her home blood pressure log today which shows her blood pressures have been ranging 130-150/'s.  She denies any chest pain, shortness of breath, dizziness, blurry vision or headaches.  She does need some medication refills today.

## 2022-09-08 ENCOUNTER — OFFICE VISIT (OUTPATIENT)
Dept: MEDICAL GROUP | Facility: PHYSICIAN GROUP | Age: 87
End: 2022-09-08
Payer: MEDICARE

## 2022-09-08 VITALS
BODY MASS INDEX: 23.56 KG/M2 | HEIGHT: 60 IN | WEIGHT: 120 LBS | DIASTOLIC BLOOD PRESSURE: 52 MMHG | HEART RATE: 61 BPM | OXYGEN SATURATION: 98 % | TEMPERATURE: 97.7 F | SYSTOLIC BLOOD PRESSURE: 144 MMHG

## 2022-09-08 DIAGNOSIS — N18.32 STAGE 3B CHRONIC KIDNEY DISEASE: ICD-10-CM

## 2022-09-08 DIAGNOSIS — D70.8 OTHER NEUTROPENIA (HCC): ICD-10-CM

## 2022-09-08 DIAGNOSIS — I10 ESSENTIAL HYPERTENSION: ICD-10-CM

## 2022-09-08 DIAGNOSIS — D64.9 LOW HEMOGLOBIN AND LOW HEMATOCRIT: ICD-10-CM

## 2022-09-08 DIAGNOSIS — E21.3 HYPERPARATHYROIDISM (HCC): ICD-10-CM

## 2022-09-08 PROCEDURE — 99214 OFFICE O/P EST MOD 30 MIN: CPT | Performed by: NURSE PRACTITIONER

## 2022-09-08 RX ORDER — AMLODIPINE BESYLATE 5 MG/1
5 TABLET ORAL DAILY
Qty: 30 TABLET | Refills: 2 | Status: SHIPPED | OUTPATIENT
Start: 2022-09-08 | End: 2022-10-03 | Stop reason: SDUPTHER

## 2022-09-08 ASSESSMENT — FIBROSIS 4 INDEX: FIB4 SCORE: 3.29

## 2022-09-08 NOTE — LETTER
[facility]  MARIA INES VizcainoPWilliamRWilliamN.  [address]  [place] [address]  Fax: [phone]   Authorization for Release/Disclosure of   Protected Health Information   Name: DELPHINE RAMSEY : 1930 SSN: [id number]   Address: [address] [place] [address] [place] [address] Phone:    [phone] (home)    I authorize the entity listed below to release/disclose the PHI below to:   [facility]/MARIA INES VizcainoP.R.KARAN and RAQUEL Vizcaino   Provider or Entity Name:  [facility]   Address   City, State, Zip   Phone: [phone]      Fax:     Reason for request: continuity of care   Information to be released:    [  ] LAST COLONOSCOPY,  including any PATH REPORT and follow-up  [  ] LAST FIT/COLOGUARD RESULT [  ] LAST DEXA  [  ] LAST MAMMOGRAM  [  ] LAST PAP  [  ] LAST LABS [  ] RETINA EXAM REPORT  [  ] IMMUNIZATION RECORDS  [  ] Release all info      [  ] Check here and initial the line next to each item to release ALL health information INCLUDING  _____ Care and treatment for drug and / or alcohol abuse  _____ HIV testing, infection status, or AIDS  _____ Genetic Testing    DATES OF SERVICE OR TIME PERIOD TO BE DISCLOSED: _____________  I understand and acknowledge that:  * This Authorization may be revoked at any time by you in writing, except if your health information has already been used or disclosed.  * Your health information that will be used or disclosed as a result of you signing this authorization could be re-disclosed by the recipient. If this occurs, your re-disclosed health information may no longer be protected by State or Federal laws.  * You may refuse to sign this Authorization. Your refusal will not affect your ability to obtain treatment.  * This Authorization becomes effective upon signing and will  on (date) __________.      If no date is indicated, this Authorization will  one (1) year from the signature date.    Name: Delphine VEGA  Leandro    Signature:   Date:     9/8/2022       PLEASE FAX REQUESTED RECORDS BACK TO: [phone]

## 2022-09-08 NOTE — ASSESSMENT & PLAN NOTE
She is followed by Dr. Hernandez with nephrology.  Upcoming appointment in December.  We are requesting updated records from Dr. Hernandez's office.

## 2022-09-08 NOTE — PROGRESS NOTES
Subjective:     CC: Hypertension follow-up    HPI:   Delphine presents today with her son Santiago the following:    Essential hypertension  Her initial blood pressure today is 150/60.  She continues metoprolol SR 25 mg daily and at her last appointment we restarted her amlodipine 2.5 mg daily.  She is checking her blood pressure at home and brings in her blood pressure log today.  Her home blood pressures have been ranging 129-140/69-85 and pulse 59-61.She is waiting at least an hour before taking her blood pressure with a wrist cuff. No falls, chest pain, shortness of breath, dizziness, blurry vision or headaches. She has an appointment in December with Dr. Hernandez. Her son has been visiting since 9/3 states that she has been improving at home.  She continues with home physical therapy twice a week.      Hyperparathyroidism ([facility])  She is followed by Dr. Hernandez with nephrology.  Upcoming appointment in December.  We are requesting updated records from Dr. Hernandez's office.    Other neutropenia ([facility])  Checking updated labs.    Past Medical History:   Diagnosis Date    GERD (gastroesophageal reflux disease)     Hyperparathyroidism ([facility])     Hypertension        Social History     Tobacco Use    Smoking status: Never    Smokeless tobacco: Never   Vaping Use    Vaping Use: Never used   Substance Use Topics    Alcohol use: No     Comment: holidays    Drug use: No       Current Outpatient Medications Ordered in Epic   Medication Sig Dispense Refill    amLODIPine (NORVASC) 5 MG Tab Take 1 Tablet by mouth every day. 30 Tablet 2    atorvastatin (LIPITOR) 40 MG Tab Take 1 Tablet by mouth at bedtime. 90 Tablet 1    metoprolol SR (TOPROL XL) 25 MG TABLET SR 24 HR Take 1 Tablet by mouth every day. 90 Tablet 0    omeprazole (PRILOSEC) 20 MG delayed-release capsule Take 1 Capsule by mouth every day. 90 Capsule 0    clopidogrel (PLAVIX) 75 MG Tab Take 1 Tablet by mouth every day. 90 Tablet 0     No current [facility]-ordered  facility-administered medications on file.       Allergies:  Patient has no known allergies.    Health Maintenance: Reviewed      Objective:     Vital signs reviewed  Exam:  BP (!) 144/52 (BP Location: Left arm, Patient Position: Sitting)   Pulse 61   Temp 36.5 °C (97.7 °F) (Temporal)   Ht 1.524 m (5')   Wt 54.4 kg (120 lb)   SpO2 98%   BMI 23.44 kg/m²  Body mass index is 23.44 kg/m².    Gen: Alert and oriented, No apparent distress.  Son present in exam room.  Lungs: Normal effort, CTA bilaterally, no wheezes, rhonchi, or rales  CV: Regular rate and rhythm. No murmurs, rubs, or gallops.  Ext: No clubbing, cyanosis, edema.  Using four-wheel walker with ambulation.    Assessment & Plan:     89 y.o. female with the following -     1. Essential hypertension  Chronic exacerbated problem.  On repeat by me today her blood pressure is 144/52.  Home blood pressures show systolics have been 140's.  Plan to increase her amlodipine to 5 mg daily.  She will continue with her metoprolol 25 mg daily.  Checking updated labs.  She will continue home blood pressure monitoring and return in 3 weeks.  - amLODIPine (NORVASC) 5 MG Tab; Take 1 Tablet by mouth every day.  Dispense: 30 Tablet; Refill: 2  - Comp Metabolic Panel; Future    2. Stage 3b chronic kidney disease (HCC)  Chronic stable problem.  Keep upcoming appointment with Dr. Hernandez.  Given recent hospitalization will repeat kidney function labs.  - Comp Metabolic Panel; Future    3. Low hemoglobin and low hematocrit  Chronic stable problem.  With recent hospitalization labs were abnormal.  Checking updated labs today.  She will return in 3 weeks for follow-up.  - CBC WITH DIFFERENTIAL; Future  - IRON/TOTAL IRON BIND; Future  - FERRITIN; Future  - VITAMIN B12; Future  - FOLATE; Future    4. Hyperparathyroidism (HCC)  Chronic stable problem.  Continue follow-up with Dr. Hernandez's office.  Requesting updated records.    5. Other neutropenia (HCC)  Chronic stable problem.   Checking updated labs.        Return in about 3 weeks (around 9/29/2022) for Hypertension.    Please note that this dictation was created using voice recognition software. I have made every reasonable attempt to correct obvious errors, but I expect that there are errors of grammar and possibly content that I did not discover before finalizing the note.

## 2022-09-08 NOTE — ASSESSMENT & PLAN NOTE
Her initial blood pressure today is 150/60.  She continues metoprolol SR 25 mg daily and at her last appointment we restarted her amlodipine 2.5 mg daily.  She is checking her blood pressure at home and brings in her blood pressure log today.  Her home blood pressures have been ranging 129-140/69-85 and pulse 59-61.She is waiting at least an hour before taking her blood pressure with a wrist cuff. No falls, chest pain, shortness of breath, dizziness, blurry vision or headaches. She has an appointment in December with Dr. Hernandez. Her son has been visiting since 9/3 states that she has been improving at home.  She continues with home physical therapy twice a week.

## 2022-10-03 ENCOUNTER — OFFICE VISIT (OUTPATIENT)
Dept: MEDICAL GROUP | Facility: PHYSICIAN GROUP | Age: 87
End: 2022-10-03
Payer: MEDICARE

## 2022-10-03 VITALS
HEIGHT: 60 IN | WEIGHT: 121 LBS | SYSTOLIC BLOOD PRESSURE: 122 MMHG | DIASTOLIC BLOOD PRESSURE: 68 MMHG | OXYGEN SATURATION: 99 % | TEMPERATURE: 96.9 F | HEART RATE: 61 BPM | BODY MASS INDEX: 23.75 KG/M2

## 2022-10-03 DIAGNOSIS — I21.02 ST ELEVATION MYOCARDIAL INFARCTION INVOLVING LEFT ANTERIOR DESCENDING (LAD) CORONARY ARTERY (HCC): ICD-10-CM

## 2022-10-03 DIAGNOSIS — Z23 FLU VACCINE NEED: ICD-10-CM

## 2022-10-03 DIAGNOSIS — D64.9 LOW HEMOGLOBIN AND LOW HEMATOCRIT: ICD-10-CM

## 2022-10-03 DIAGNOSIS — I10 ESSENTIAL HYPERTENSION: ICD-10-CM

## 2022-10-03 PROCEDURE — G0008 ADMIN INFLUENZA VIRUS VAC: HCPCS | Performed by: NURSE PRACTITIONER

## 2022-10-03 PROCEDURE — 99214 OFFICE O/P EST MOD 30 MIN: CPT | Mod: 25 | Performed by: NURSE PRACTITIONER

## 2022-10-03 PROCEDURE — 90662 IIV NO PRSV INCREASED AG IM: CPT | Performed by: NURSE PRACTITIONER

## 2022-10-03 RX ORDER — AMLODIPINE BESYLATE 5 MG/1
5 TABLET ORAL DAILY
Qty: 90 TABLET | Refills: 3 | Status: SHIPPED | OUTPATIENT
Start: 2022-10-03 | End: 2023-09-11

## 2022-10-03 RX ORDER — CLOPIDOGREL BISULFATE 75 MG/1
75 TABLET ORAL DAILY
Qty: 90 TABLET | Refills: 1 | Status: SHIPPED | OUTPATIENT
Start: 2022-10-17 | End: 2023-02-23 | Stop reason: SDUPTHER

## 2022-10-03 ASSESSMENT — FIBROSIS 4 INDEX: FIB4 SCORE: 3.29

## 2022-10-03 NOTE — PROGRESS NOTES
Subjective:     CC: Hypertension follow-up    HPI:   Delphine presents today with the following:    Low hemoglobin and low hematocrit  She has not been able to complete her labs due to transportation issues.  She plans to complete as soon as she is able.  She does have an upcoming Nephrology appointment in December with Dr. Hernandez.     Essential hypertension  Her blood pressure is at goal today. She continues with amlodipine 5 mg daily and metoprolol SR 25 mg daily. She has been monitoring her blood pressure at home and brings in her log today. Her home blood pressures have been ranging 126-137/47-70 and her pulse rate 55-63. Denies chest pain, falls, shortness of breath, dizziness, blurry vision or headache. She has completed PT. She states that she has graduated to a cane.       Past Medical History:   Diagnosis Date    GERD (gastroesophageal reflux disease)     Hyperparathyroidism (HCC)     Hypertension        Social History     Tobacco Use    Smoking status: Never    Smokeless tobacco: Never   Vaping Use    Vaping Use: Never used   Substance Use Topics    Alcohol use: No     Comment: holidays    Drug use: No       Current Outpatient Medications Ordered in Epic   Medication Sig Dispense Refill    amLODIPine (NORVASC) 5 MG Tab Take 1 Tablet by mouth every day. 90 Tablet 3    [START ON 10/17/2022] clopidogrel (PLAVIX) 75 MG Tab Take 1 Tablet by mouth every day. 90 Tablet 1    atorvastatin (LIPITOR) 40 MG Tab Take 1 Tablet by mouth at bedtime. 90 Tablet 1    metoprolol SR (TOPROL XL) 25 MG TABLET SR 24 HR Take 1 Tablet by mouth every day. 90 Tablet 0    omeprazole (PRILOSEC) 20 MG delayed-release capsule Take 1 Capsule by mouth every day. 90 Capsule 0     No current Epic-ordered facility-administered medications on file.       Allergies:  Patient has no known allergies.    Health Maintenance: reviewed.       Objective:     Vital signs reviewed  Exam:  /68 (BP Location: Left arm, Patient Position: Sitting, BP  Cuff Size: Adult)   Pulse 61   Temp 36.1 °C (96.9 °F) (Temporal)   Ht 1.524 m (5')   Wt 54.9 kg (121 lb)   SpO2 99%   BMI 23.63 kg/m²  Body mass index is 23.63 kg/m².    Gen: Alert and oriented, No apparent distress.  Neck: Neck is supple without lymphadenopathy.  Lungs: Normal effort, CTA bilaterally, no wheezes, rhonchi, or rales  CV: Regular rate and rhythm with systolic murmur. No rubs or gallops.  Ext: No clubbing, cyanosis, edema. Using FWW with ambulation.       Assessment & Plan:     89 y.o. female with the following -       1. Essential hypertension  Chronic stable problem.  Her blood pressure is at goal today.  She will continue with her amlodipine 5 mg daily and metoprolol SR 25 mg daily.  Continue intermittent home blood pressure monitoring.  Return in 2 months for follow-up.  Keep upcoming cardiology appointment next week.  - amLODIPine (NORVASC) 5 MG Tab; Take 1 Tablet by mouth every day.  Dispense: 90 Tablet; Refill: 3    2. ST elevation myocardial infarction involving left anterior descending (LAD) coronary artery (HCC)  Chronic stable problem.  She is asking for refill on her Plavix to be sent to the mail order pharmacy.  She does have an upcoming appointment next week with cardiology.  She has enough prescription for that appointment.  Medication refilled today.  We did discuss that if cardiology decides to continue the refill was sent to dispense in 2 weeks from now.  She has at least #20 tablets currently in her Plavix bottle.  She verbalized understanding.  - clopidogrel (PLAVIX) 75 MG Tab; Take 1 Tablet by mouth every day.  Dispense: 90 Tablet; Refill: 1    3. Low hemoglobin and low hematocrit  Chronic stable problem.  Encouraged her to complete updated labs.    4. Flu vaccine need  Acute uncomplicated problem.  She would like her flu vaccine today. I have placed the below orders and discussed them with an approved delegating provider.  The MA is performing the below orders under the  direction of Dr. Cruz..  - INFLUENZA VACCINE, HIGH DOSE (65+ ONLY)        Return in about 2 months (around 12/3/2022) for Labs, Hypertension.    Please note that this dictation was created using voice recognition software. I have made every reasonable attempt to correct obvious errors, but I expect that there are errors of grammar and possibly content that I did not discover before finalizing the note.

## 2022-10-03 NOTE — ASSESSMENT & PLAN NOTE
Her blood pressure is at goal today. She continues with amlodipine 5 mg daily and metoprolol SR 25 mg daily. She has been monitoring her blood pressure at home and brings in her log today. Her home blood pressures have been ranging 126-137/47-70 and her pulse rate 55-63. Denies chest pain, falls, shortness of breath, dizziness, blurry vision or headache. She has completed PT. She states that she has graduated to a cane.

## 2022-10-03 NOTE — ASSESSMENT & PLAN NOTE
She has not been able to complete her labs due to transportation issues.  She plans to complete as soon as she is able.  She does have an upcoming Nephrology appointment in December with Dr. Hernandez.

## 2022-10-10 ENCOUNTER — OFFICE VISIT (OUTPATIENT)
Dept: CARDIOLOGY | Facility: MEDICAL CENTER | Age: 87
End: 2022-10-10
Payer: MEDICARE

## 2022-10-10 VITALS
HEART RATE: 56 BPM | OXYGEN SATURATION: 100 % | DIASTOLIC BLOOD PRESSURE: 36 MMHG | WEIGHT: 121 LBS | BODY MASS INDEX: 23.75 KG/M2 | RESPIRATION RATE: 16 BRPM | SYSTOLIC BLOOD PRESSURE: 140 MMHG | HEIGHT: 60 IN

## 2022-10-10 DIAGNOSIS — N18.32 STAGE 3B CHRONIC KIDNEY DISEASE: ICD-10-CM

## 2022-10-10 DIAGNOSIS — I21.02 ST ELEVATION MYOCARDIAL INFARCTION INVOLVING LEFT ANTERIOR DESCENDING (LAD) CORONARY ARTERY (HCC): ICD-10-CM

## 2022-10-10 DIAGNOSIS — I48.0 PAROXYSMAL A-FIB (HCC): ICD-10-CM

## 2022-10-10 DIAGNOSIS — I10 ESSENTIAL HYPERTENSION, BENIGN: ICD-10-CM

## 2022-10-10 PROCEDURE — 99214 OFFICE O/P EST MOD 30 MIN: CPT | Performed by: NURSE PRACTITIONER

## 2022-10-10 ASSESSMENT — FIBROSIS 4 INDEX: FIB4 SCORE: 3.29

## 2022-10-10 NOTE — PROGRESS NOTES
Cardiology Clinic Follow-up Note    Date of note:    10/10/2022  Primary Care Provider: RAQUEL Vizcaino    Name:             Delphine Reeves  YOB: 1930  MRN:               [id number]    CC: Hospital follow-up, s/p STEMI    Primary Cardiologist: Dr Mcgowan    Patient HPI:   Delphine Reeves is a 89 y.o. female with current medical problems including hypertension, anemia, frequent falls, CKD stage 3b (follows with nephrology), and recent STEMI with 100% blockage of distal LAD (not amenable to PCI)    Interim History:  Ms. Reeves was seen by Dr. Hodges for cardiology consult upon admission for STEMI.  It was decided that she would undergo LHC.  Performed by Dr. Mcgowan, found to have 1 complete occlusion of distal LAD, unamenable to stenting, recommendations for medical management.  Patient also found to be in A. fib with RVR upon presentation to the ED as well as having hypotension.    She remains in sinus bradycardia currently. She denies palpitations, chest pain, shortness of breath, dyspnea on exertion, dizziness or syncopal episodes, orthopnea, PND,  and recent weight gain.  Positive bilateral lower extremity swelling, says this is quite normal for her usually improves with after elevating her legs.  Positive generalized weakness in her bilateral lower extremities however this is her baseline, she has completed PT and continues doing exercises at home    Patient endorses medication compliance.  Was started back on her Norvasc that she took previously, now currently on 5 mg daily.    Review of systems:  All others systems reviewed and negative except for what is outlined in the above HPI    Past Medical History:   Diagnosis Date    GERD (gastroesophageal reflux disease)     Hyperparathyroidism (HCC)     Hypertension      Past Surgical History:   Procedure Laterality Date    KNEE REPLACEMENT, TOTAL Left      Family History   Problem Relation Age of Onset    Heart Disease Mother     Heart  Disease Father     Lung Disease Sister     Heart Disease Brother     No Known Problems Brother     Cancer Neg Hx     Diabetes Neg Hx      Social History     Socioeconomic History    Marital status:      Spouse name: Not on file    Number of children: Not on file    Years of education: Not on file    Highest education level: Not on file   Occupational History    Not on file   Tobacco Use    Smoking status: Never    Smokeless tobacco: Never   Vaping Use    Vaping Use: Never used   Substance and Sexual Activity    Alcohol use: No     Comment: holidays    Drug use: No    Sexual activity: Not Currently     Comment:  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 90 Tablet 3    [START ON 10/17/2022] clopidogrel (PLAVIX) 75 MG Tab Take 1 Tablet by mouth every day. 90 Tablet 1    atorvastatin (LIPITOR) 40 MG Tab Take 1 Tablet by mouth at bedtime. 90 Tablet 1    metoprolol SR (TOPROL XL) 25 MG TABLET SR 24 HR Take 1 Tablet by mouth every day. 90 Tablet 0    omeprazole (PRILOSEC) 20 MG delayed-release capsule Take 1 Capsule by mouth every day. 90 Capsule 0     No current facility-administered medications for this visit.       Physical Exam:  Ambulatory Vitals  BP (!) 140/36 (BP Location: Left arm, Patient Position: Sitting)   Pulse (!) 56   Resp 16   Ht 1.524 m (5')   Wt 54.9 kg (121 lb)   SpO2 100%    BP Readings from Last 4 Encounters:   10/10/22 (!) 140/36   10/03/22 122/68    09/08/22 (!) 144/52   09/01/22 (!) 164/64       Weight/BMI: Body mass index is 23.63 kg/m².  Wt Readings from Last 4 Encounters:   10/10/22 54.9 kg (121 lb)   10/03/22 54.9 kg (121 lb)   09/08/22 54.4 kg (120 lb)   09/01/22 55.3 kg (122 lb)     General: No apparent distress. Well nourished.   Neck: No JVD. No caroid bruits, trachea midline  Lungs: CTAB. Normal effort, without crackles/rhonchi, no wheezing  Heart: Regular, bradycardia. Normal S1/S2, no murmur, no rub. +1 lower extremity edema. 2+ radial pulses, 2+ DT pulses  Ext: No clubbing or cyanosis.  Abdomen: soft, non tender, non distended, no prashanth hepatomegaly.  Neurological: No focal deficits, no facial asymmetry.  Normal speech.  Psychiatric: Appropriate affect, alert and oriented x 4.   Skin: Warm and dry, no rash.    Lab Data Review:  Lab Results   Component Value Date/Time    CHOLSTRLTOT 94 (L) 08/07/2022 01:24 AM    LDL 43 08/07/2022 01:24 AM    HDL 43 08/07/2022 01:24 AM    TRIGLYCERIDE 41 08/07/2022 01:24 AM       Lab Results   Component Value Date/Time    SODIUM 141 08/09/2022 01:24 AM    POTASSIUM 3.7 08/09/2022 01:24 AM    CHLORIDE 111 08/09/2022 01:24 AM    CO2 18 (L) 08/09/2022 01:24 AM    GLUCOSE 97 08/09/2022 01:24 AM    BUN 26 (H) 08/09/2022 01:24 AM    CREATININE 1.49 (H) 08/09/2022 01:24 AM     Lab Results   Component Value Date/Time    ALKPHOSPHAT 57 08/09/2022 01:24 AM    ASTSGOT 20 08/09/2022 01:24 AM    ALTSGPT 20 08/09/2022 01:24 AM    TBILIRUBIN 0.3 08/09/2022 01:24 AM      Lab Results   Component Value Date/Time    WBC 7.6 08/09/2022 01:24 AM     Cardiac Imaging and Procedures Review:      EKG 8/7/22: My Personal interpretation reveals SB 52    Echo 8/7/22:  Compared to the prior echo on 01/05/2016, apical akinesis with small-  moderate pericardial effusion findings are now present.  Atrial fibrillation / flutter is present.  Mild concentric LVH, normal LV systolic function with apical akinesis.   No LV apical thrombus.  Normal  right ventricular size and systolic function.  Aortic valve sclerosis without stenosis. Stage A (at risk for AS) per   doppler assessment.  Small-moderate pericardial effusion without echocardiographic evidence   of hemodynamic compromise.   Normal IVC size.    [facility] 22:  CORONARY ANGIOGRAPHY:  The left main coronary artery : Normal appearing, bifurcates to LAD and left circumflex  The left anterior descending coronary artery: Calcified vessel, mild 30 to 40% proximal stenosis, 100% occluded distal LAD with CARROLL 0 flow, and diffuse 50 to 70% stenoses throughout the mid/distal LAD after the takeoff of the diagonal branch.   The left circumflex coronary artery : Diffuse luminal irregularities, circumflex gives off an OM1 and a bifurcating OM 2 before it tapers into the AV groove.  The right coronary artery: Dominant vessel calcified proximal and mid RCA, mild 30 to 40% stenosis in the proximal segment.  IMPRESSION:  1.  Severe one-vessel CAD (LAD) status post unsuccessful attempt at distal LAD revascularization given diffuse nature of severe obstructive CAD throughout the distal LAD with small in caliber distal/apical LAD.  2.  High normal resting LVEDP with no significant transaortic gradient on pullback  3.  Atrial flutter with RVR  4.  Calcified distal right radial and right ulnar arteries    Assessment and Clinical Decision Makin. Paroxysmal A-fib ([facility])        2. ST elevation myocardial infarction involving left anterior descending (LAD) coronary artery ([facility])        3. Stage 3b chronic kidney disease (HCC)        4. Essential hypertension, benign          The following treatment plan was discussed    Paroxysmal atrial fibrillation  -Last noted EKG patient with sinus bradycardia  -Today rate is regular, no complaints of recent palpitations or fast heart rates per patient  -Continue metoprolol SR 25 mg daily  -Given high risk for bleeding with falls and increased age it was decided safest option was for  patient to remain off anticoagulation with only Plavix which she is tolerating    S/p STEMI  -Continue Plavix 75 mg daily x1 year, no DAPT given high bleeding risk with frequent falls and age  -Continue atorvastatin 40 mg nightly    Stage IIIb CKD  -Continue following with nephrology    HTN  -PCP reinitiated amlodipine, currently 5 mg daily  -Blood pressures much better controlled  Goal BP for this 89-year-old should be < 140/90    Plan reviewed in detail with the patient, verbalizes understanding and is in agreement.  Pt is to follow up with Dr. Mcgowan in 3 months  Encouraged Pt to follow up with us over the phone or electronically using my T[employer] as cardiac issues/concerns arise.      PLEASE NOTE: This dictation was created using voice recognition software. I have made every reasonable attempt to correct obvious errors, but I expect that there are errors of grammar and possibly content that I did not discover before finalizing the note.       MYNOR Pierre.   [facility]  [phone]    Collaborating Physician: Dr. Saldivar

## 2022-11-07 ENCOUNTER — PATIENT MESSAGE (OUTPATIENT)
Dept: HEALTH INFORMATION MANAGEMENT | Facility: OTHER | Age: 87
End: 2022-11-07

## 2022-11-07 DIAGNOSIS — I48.0 PAROXYSMAL A-FIB (HCC): ICD-10-CM

## 2022-11-07 RX ORDER — METOPROLOL SUCCINATE 25 MG/1
TABLET, EXTENDED RELEASE ORAL
Qty: 90 TABLET | Refills: 3 | Status: SHIPPED | OUTPATIENT
Start: 2022-11-07 | End: 2023-10-11

## 2022-11-07 RX ORDER — OMEPRAZOLE 20 MG/1
CAPSULE, DELAYED RELEASE ORAL
Qty: 90 CAPSULE | Refills: 3 | Status: SHIPPED | OUTPATIENT
Start: 2022-11-07 | End: 2022-12-12

## 2022-11-07 NOTE — TELEPHONE ENCOUNTER
Requested Prescriptions     Signed Prescriptions Disp Refills    omeprazole (PRILOSEC) 20 MG delayed-release capsule 90 Capsule 3     Sig: TAKE 1 CAPSULE BY MOUTH  DAILY     Authorizing Provider: ALLISON SIMPSON    metoprolol SR (TOPROL XL) 25 MG TABLET SR 24 HR 90 Tablet 3     Sig: TAKE 1 TABLET BY MOUTH  DAILY     Authorizing Provider: ALLISON SIMPSON A.P.R.N.

## 2022-11-10 ENCOUNTER — APPOINTMENT (OUTPATIENT)
Dept: LAB | Facility: MEDICAL CENTER | Age: 87
End: 2022-11-10
Attending: NURSE PRACTITIONER
Payer: MEDICARE

## 2022-11-10 DIAGNOSIS — I10 ESSENTIAL HYPERTENSION: ICD-10-CM

## 2022-11-10 DIAGNOSIS — D64.9 LOW HEMOGLOBIN AND LOW HEMATOCRIT: ICD-10-CM

## 2022-11-10 DIAGNOSIS — N18.32 STAGE 3B CHRONIC KIDNEY DISEASE: ICD-10-CM

## 2022-11-10 LAB
25(OH)D3 SERPL-MCNC: 51 NG/ML (ref 30–100)
ALBUMIN SERPL BCP-MCNC: 4 G/DL (ref 3.2–4.9)
ALBUMIN SERPL BCP-MCNC: 4.1 G/DL (ref 3.2–4.9)
ALBUMIN/GLOB SERPL: 1.5 G/DL
ALBUMIN/GLOB SERPL: 1.6 G/DL
ALP SERPL-CCNC: 71 U/L (ref 30–99)
ALP SERPL-CCNC: 72 U/L (ref 30–99)
ALT SERPL-CCNC: 10 U/L (ref 2–50)
ALT SERPL-CCNC: 9 U/L (ref 2–50)
ANION GAP SERPL CALC-SCNC: 14 MMOL/L (ref 7–16)
ANION GAP SERPL CALC-SCNC: 16 MMOL/L (ref 7–16)
ANISOCYTOSIS BLD QL SMEAR: ABNORMAL
ANISOCYTOSIS BLD QL SMEAR: ABNORMAL
APPEARANCE UR: ABNORMAL
AST SERPL-CCNC: 14 U/L (ref 12–45)
AST SERPL-CCNC: 15 U/L (ref 12–45)
BACTERIA #/AREA URNS HPF: ABNORMAL /HPF
BASOPHILS # BLD AUTO: 0 % (ref 0–1.8)
BASOPHILS # BLD AUTO: 0.3 % (ref 0–1.8)
BASOPHILS # BLD: 0 K/UL (ref 0–0.12)
BASOPHILS # BLD: 0.01 K/UL (ref 0–0.12)
BILIRUB SERPL-MCNC: 0.6 MG/DL (ref 0.1–1.5)
BILIRUB SERPL-MCNC: 0.6 MG/DL (ref 0.1–1.5)
BILIRUB UR QL STRIP.AUTO: NEGATIVE
BUN SERPL-MCNC: 17 MG/DL (ref 8–22)
BUN SERPL-MCNC: 18 MG/DL (ref 8–22)
BURR CELLS BLD QL SMEAR: NORMAL
BURR CELLS BLD QL SMEAR: NORMAL
CALCIUM SERPL-MCNC: 7.6 MG/DL (ref 8.5–10.5)
CALCIUM SERPL-MCNC: 7.7 MG/DL (ref 8.5–10.5)
CHLORIDE SERPL-SCNC: 109 MMOL/L (ref 96–112)
CHLORIDE SERPL-SCNC: 109 MMOL/L (ref 96–112)
CHOLEST SERPL-MCNC: 89 MG/DL (ref 100–199)
CO2 SERPL-SCNC: 20 MMOL/L (ref 20–33)
CO2 SERPL-SCNC: 22 MMOL/L (ref 20–33)
COLOR UR: YELLOW
COMMENT 1642: NORMAL
CREAT SERPL-MCNC: 1.32 MG/DL (ref 0.5–1.4)
CREAT SERPL-MCNC: 1.38 MG/DL (ref 0.5–1.4)
CREAT UR-MCNC: 150.3 MG/DL
EOSINOPHIL # BLD AUTO: 0 K/UL (ref 0–0.51)
EOSINOPHIL # BLD AUTO: 0.01 K/UL (ref 0–0.51)
EOSINOPHIL NFR BLD: 0 % (ref 0–6.9)
EOSINOPHIL NFR BLD: 0.3 % (ref 0–6.9)
EPI CELLS #/AREA URNS HPF: ABNORMAL /HPF
ERYTHROCYTE [DISTWIDTH] IN BLOOD BY AUTOMATED COUNT: 57.8 FL (ref 35.9–50)
ERYTHROCYTE [DISTWIDTH] IN BLOOD BY AUTOMATED COUNT: 58.1 FL (ref 35.9–50)
FERRITIN SERPL-MCNC: 27.5 NG/ML (ref 10–291)
FOLATE SERPL-MCNC: 21.9 NG/ML
GFR SERPLBLD CREATININE-BSD FMLA CKD-EPI: 36 ML/MIN/1.73 M 2
GFR SERPLBLD CREATININE-BSD FMLA CKD-EPI: 38 ML/MIN/1.73 M 2
GLOBULIN SER CALC-MCNC: 2.5 G/DL (ref 1.9–3.5)
GLOBULIN SER CALC-MCNC: 2.6 G/DL (ref 1.9–3.5)
GLUCOSE SERPL-MCNC: 94 MG/DL (ref 65–99)
GLUCOSE SERPL-MCNC: 96 MG/DL (ref 65–99)
GLUCOSE UR STRIP.AUTO-MCNC: NEGATIVE MG/DL
HCT VFR BLD AUTO: 23.5 % (ref 37–47)
HCT VFR BLD AUTO: 24.4 % (ref 37–47)
HDLC SERPL-MCNC: 38 MG/DL
HGB BLD-MCNC: 6.2 G/DL (ref 12–16)
HGB BLD-MCNC: 6.4 G/DL (ref 12–16)
HYALINE CASTS #/AREA URNS LPF: ABNORMAL /LPF
HYPOCHROMIA BLD QL SMEAR: ABNORMAL
HYPOCHROMIA BLD QL SMEAR: ABNORMAL
IMM GRANULOCYTES # BLD AUTO: 0.01 K/UL (ref 0–0.11)
IMM GRANULOCYTES NFR BLD AUTO: 0.3 % (ref 0–0.9)
IRON SATN MFR SERPL: 6 % (ref 15–55)
IRON SATN MFR SERPL: 7 % (ref 15–55)
IRON SERPL-MCNC: 19 UG/DL (ref 40–170)
IRON SERPL-MCNC: 20 UG/DL (ref 40–170)
KETONES UR STRIP.AUTO-MCNC: ABNORMAL MG/DL
LDLC SERPL CALC-MCNC: 37 MG/DL
LEUKOCYTE ESTERASE UR QL STRIP.AUTO: ABNORMAL
LYMPHOCYTES # BLD AUTO: 1.38 K/UL (ref 1–4.8)
LYMPHOCYTES # BLD AUTO: 1.39 K/UL (ref 1–4.8)
LYMPHOCYTES NFR BLD: 35 % (ref 22–41)
LYMPHOCYTES NFR BLD: 35.4 % (ref 22–41)
MAGNESIUM SERPL-MCNC: 1.8 MG/DL (ref 1.5–2.5)
MANUAL DIFF BLD: NORMAL
MCH RBC QN AUTO: 21.8 PG (ref 27–33)
MCH RBC QN AUTO: 21.9 PG (ref 27–33)
MCHC RBC AUTO-ENTMCNC: 26.2 G/DL (ref 33.6–35)
MCHC RBC AUTO-ENTMCNC: 26.4 G/DL (ref 33.6–35)
MCV RBC AUTO: 82.5 FL (ref 81.4–97.8)
MCV RBC AUTO: 83.6 FL (ref 81.4–97.8)
MICRO URNS: ABNORMAL
MICROCYTES BLD QL SMEAR: ABNORMAL
MICROCYTES BLD QL SMEAR: ABNORMAL
MONOCYTES # BLD AUTO: 0.31 K/UL (ref 0–0.85)
MONOCYTES # BLD AUTO: 0.58 K/UL (ref 0–0.85)
MONOCYTES NFR BLD AUTO: 14.6 % (ref 0–13.4)
MONOCYTES NFR BLD AUTO: 8 % (ref 0–13.4)
MORPHOLOGY BLD-IMP: NORMAL
MORPHOLOGY BLD-IMP: NORMAL
NEUTROPHILS # BLD AUTO: 1.97 K/UL (ref 2–7.15)
NEUTROPHILS # BLD AUTO: 2.21 K/UL (ref 2–7.15)
NEUTROPHILS NFR BLD: 49.5 % (ref 44–72)
NEUTROPHILS NFR BLD: 56.6 % (ref 44–72)
NITRITE UR QL STRIP.AUTO: POSITIVE
NRBC # BLD AUTO: 0 K/UL
NRBC # BLD AUTO: 0 K/UL
NRBC BLD-RTO: 0 /100 WBC
NRBC BLD-RTO: 0 /100 WBC
OVALOCYTES BLD QL SMEAR: NORMAL
OVALOCYTES BLD QL SMEAR: NORMAL
PH UR STRIP.AUTO: 6 [PH] (ref 5–8)
PHOSPHATE SERPL-MCNC: 3.9 MG/DL (ref 2.5–4.5)
PLATELET # BLD AUTO: 155 K/UL (ref 164–446)
PLATELET # BLD AUTO: 162 K/UL (ref 164–446)
PLATELET BLD QL SMEAR: NORMAL
PLATELET BLD QL SMEAR: NORMAL
PMV BLD AUTO: 11.7 FL (ref 9–12.9)
PMV BLD AUTO: 12.8 FL (ref 9–12.9)
POIKILOCYTOSIS BLD QL SMEAR: NORMAL
POIKILOCYTOSIS BLD QL SMEAR: NORMAL
POTASSIUM SERPL-SCNC: 3.9 MMOL/L (ref 3.6–5.5)
POTASSIUM SERPL-SCNC: 4 MMOL/L (ref 3.6–5.5)
PROT SERPL-MCNC: 6.6 G/DL (ref 6–8.2)
PROT SERPL-MCNC: 6.6 G/DL (ref 6–8.2)
PROT UR QL STRIP: 30 MG/DL
PROT UR-MCNC: 37 MG/DL (ref 0–15)
PROT/CREAT UR: 246 MG/G (ref 10–107)
PTH-INTACT SERPL-MCNC: 83.8 PG/ML (ref 14–72)
RBC # BLD AUTO: 2.85 M/UL (ref 4.2–5.4)
RBC # BLD AUTO: 2.92 M/UL (ref 4.2–5.4)
RBC # URNS HPF: ABNORMAL /HPF
RBC BLD AUTO: PRESENT
RBC BLD AUTO: PRESENT
RBC UR QL AUTO: NEGATIVE
SCHISTOCYTES BLD QL SMEAR: NORMAL
SCHISTOCYTES BLD QL SMEAR: NORMAL
SODIUM SERPL-SCNC: 145 MMOL/L (ref 135–145)
SODIUM SERPL-SCNC: 145 MMOL/L (ref 135–145)
SP GR UR STRIP.AUTO: 1.02
TIBC SERPL-MCNC: 297 UG/DL (ref 250–450)
TIBC SERPL-MCNC: 304 UG/DL (ref 250–450)
TRIGL SERPL-MCNC: 70 MG/DL (ref 0–149)
UIBC SERPL-MCNC: 277 UG/DL (ref 110–370)
UIBC SERPL-MCNC: 285 UG/DL (ref 110–370)
URATE SERPL-MCNC: 7.9 MG/DL (ref 1.9–8.2)
UROBILINOGEN UR STRIP.AUTO-MCNC: 1 MG/DL
VIT B12 SERPL-MCNC: 715 PG/ML (ref 211–911)
WBC # BLD AUTO: 3.9 K/UL (ref 4.8–10.8)
WBC # BLD AUTO: 4 K/UL (ref 4.8–10.8)
WBC #/AREA URNS HPF: ABNORMAL /HPF

## 2022-11-10 PROCEDURE — 87186 SC STD MICRODIL/AGAR DIL: CPT

## 2022-11-10 PROCEDURE — 83735 ASSAY OF MAGNESIUM: CPT

## 2022-11-10 PROCEDURE — 83550 IRON BINDING TEST: CPT

## 2022-11-10 PROCEDURE — 82570 ASSAY OF URINE CREATININE: CPT

## 2022-11-10 PROCEDURE — 87077 CULTURE AEROBIC IDENTIFY: CPT

## 2022-11-10 PROCEDURE — 84550 ASSAY OF BLOOD/URIC ACID: CPT

## 2022-11-10 PROCEDURE — 83540 ASSAY OF IRON: CPT

## 2022-11-10 PROCEDURE — 85025 COMPLETE CBC W/AUTO DIFF WBC: CPT

## 2022-11-10 PROCEDURE — 80061 LIPID PANEL: CPT

## 2022-11-10 PROCEDURE — 82607 VITAMIN B-12: CPT

## 2022-11-10 PROCEDURE — 80053 COMPREHEN METABOLIC PANEL: CPT | Mod: 91

## 2022-11-10 PROCEDURE — 82728 ASSAY OF FERRITIN: CPT

## 2022-11-10 PROCEDURE — 84100 ASSAY OF PHOSPHORUS: CPT

## 2022-11-10 PROCEDURE — 83540 ASSAY OF IRON: CPT | Mod: 91

## 2022-11-10 PROCEDURE — 85025 COMPLETE CBC W/AUTO DIFF WBC: CPT | Mod: 91

## 2022-11-10 PROCEDURE — 83970 ASSAY OF PARATHORMONE: CPT

## 2022-11-10 PROCEDURE — 85007 BL SMEAR W/DIFF WBC COUNT: CPT

## 2022-11-10 PROCEDURE — 81001 URINALYSIS AUTO W/SCOPE: CPT

## 2022-11-10 PROCEDURE — 84156 ASSAY OF PROTEIN URINE: CPT

## 2022-11-10 PROCEDURE — 80053 COMPREHEN METABOLIC PANEL: CPT

## 2022-11-10 PROCEDURE — 36415 COLL VENOUS BLD VENIPUNCTURE: CPT

## 2022-11-10 PROCEDURE — 83036 HEMOGLOBIN GLYCOSYLATED A1C: CPT | Mod: GA

## 2022-11-10 PROCEDURE — 87086 URINE CULTURE/COLONY COUNT: CPT

## 2022-11-10 PROCEDURE — 82746 ASSAY OF FOLIC ACID SERUM: CPT

## 2022-11-10 PROCEDURE — 83550 IRON BINDING TEST: CPT | Mod: 91

## 2022-11-10 PROCEDURE — 82306 VITAMIN D 25 HYDROXY: CPT

## 2022-11-11 LAB
EST. AVERAGE GLUCOSE BLD GHB EST-MCNC: 108 MG/DL
HBA1C MFR BLD: 5.4 % (ref 4–5.6)

## 2022-11-14 ENCOUNTER — TELEPHONE (OUTPATIENT)
Dept: MEDICAL GROUP | Facility: PHYSICIAN GROUP | Age: 87
End: 2022-11-14
Payer: MEDICARE

## 2022-11-15 ENCOUNTER — HOSPITAL ENCOUNTER (INPATIENT)
Facility: MEDICAL CENTER | Age: 87
LOS: 1 days | DRG: 812 | End: 2022-11-16
Attending: EMERGENCY MEDICINE | Admitting: HOSPITALIST
Payer: MEDICARE

## 2022-11-15 DIAGNOSIS — D64.9 ANEMIA, UNSPECIFIED TYPE: ICD-10-CM

## 2022-11-15 DIAGNOSIS — D62 ACUTE BLOOD LOSS ANEMIA: ICD-10-CM

## 2022-11-15 LAB
ABO GROUP BLD: NORMAL
ALBUMIN SERPL BCP-MCNC: 4.1 G/DL (ref 3.2–4.9)
ALBUMIN/GLOB SERPL: 1.8 G/DL
ALP SERPL-CCNC: 63 U/L (ref 30–99)
ALT SERPL-CCNC: 9 U/L (ref 2–50)
ANION GAP SERPL CALC-SCNC: 13 MMOL/L (ref 7–16)
AST SERPL-CCNC: 26 U/L (ref 12–45)
BARCODED ABORH UBTYP: 5100
BARCODED PRD CODE UBPRD: NORMAL
BARCODED UNIT NUM UBUNT: NORMAL
BASOPHILS # BLD AUTO: 0 % (ref 0–1.8)
BASOPHILS # BLD: 0 K/UL (ref 0–0.12)
BILIRUB SERPL-MCNC: 0.6 MG/DL (ref 0.1–1.5)
BLD GP AB SCN SERPL QL: NORMAL
BUN SERPL-MCNC: 20 MG/DL (ref 8–22)
CALCIUM SERPL-MCNC: 8.2 MG/DL (ref 8.4–10.2)
CHLORIDE SERPL-SCNC: 105 MMOL/L (ref 96–112)
CO2 SERPL-SCNC: 20 MMOL/L (ref 20–33)
COMPONENT R 8504R: NORMAL
CREAT SERPL-MCNC: 1.21 MG/DL (ref 0.5–1.4)
EKG IMPRESSION: NORMAL
EOSINOPHIL # BLD AUTO: 0.02 K/UL (ref 0–0.51)
EOSINOPHIL NFR BLD: 0.4 % (ref 0–6.9)
ERYTHROCYTE [DISTWIDTH] IN BLOOD BY AUTOMATED COUNT: 57.9 FL (ref 35.9–50)
ERYTHROCYTE [DISTWIDTH] IN BLOOD BY AUTOMATED COUNT: 58.8 FL (ref 35.9–50)
GFR SERPLBLD CREATININE-BSD FMLA CKD-EPI: 42 ML/MIN/1.73 M 2
GLOBULIN SER CALC-MCNC: 2.3 G/DL (ref 1.9–3.5)
GLUCOSE SERPL-MCNC: 110 MG/DL (ref 65–99)
HCT VFR BLD AUTO: 23.6 % (ref 37–47)
HCT VFR BLD AUTO: 29.5 % (ref 37–47)
HGB BLD-MCNC: 6.1 G/DL (ref 12–16)
HGB BLD-MCNC: 7.1 G/DL (ref 12–16)
HGB BLD-MCNC: 8.4 G/DL (ref 12–16)
IMM GRANULOCYTES # BLD AUTO: 0.01 K/UL (ref 0–0.11)
IMM GRANULOCYTES NFR BLD AUTO: 0.2 % (ref 0–0.9)
LYMPHOCYTES # BLD AUTO: 1.82 K/UL (ref 1–4.8)
LYMPHOCYTES NFR BLD: 38 % (ref 22–41)
MCH RBC QN AUTO: 21.3 PG (ref 27–33)
MCH RBC QN AUTO: 23.9 PG (ref 27–33)
MCHC RBC AUTO-ENTMCNC: 25.8 G/DL (ref 33.6–35)
MCHC RBC AUTO-ENTMCNC: 28.5 G/DL (ref 33.6–35)
MCV RBC AUTO: 82.5 FL (ref 81.4–97.8)
MCV RBC AUTO: 84 FL (ref 81.4–97.8)
MONOCYTES # BLD AUTO: 0.86 K/UL (ref 0–0.85)
MONOCYTES NFR BLD AUTO: 18 % (ref 0–13.4)
NEUTROPHILS # BLD AUTO: 2.08 K/UL (ref 2–7.15)
NEUTROPHILS NFR BLD: 43.4 % (ref 44–72)
NRBC # BLD AUTO: 0 K/UL
NRBC BLD-RTO: 0 /100 WBC
PLATELET # BLD AUTO: 147 K/UL (ref 164–446)
PLATELET # BLD AUTO: 155 K/UL (ref 164–446)
PMV BLD AUTO: 12 FL (ref 9–12.9)
PMV BLD AUTO: 12.7 FL (ref 9–12.9)
POTASSIUM SERPL-SCNC: 4.5 MMOL/L (ref 3.6–5.5)
PRODUCT TYPE UPROD: NORMAL
PROT SERPL-MCNC: 6.4 G/DL (ref 6–8.2)
RBC # BLD AUTO: 2.86 M/UL (ref 4.2–5.4)
RBC # BLD AUTO: 3.51 M/UL (ref 4.2–5.4)
RH BLD: NORMAL
SODIUM SERPL-SCNC: 138 MMOL/L (ref 135–145)
UNIT STATUS USTAT: NORMAL
WBC # BLD AUTO: 4.1 K/UL (ref 4.8–10.8)
WBC # BLD AUTO: 4.8 K/UL (ref 4.8–10.8)

## 2022-11-15 PROCEDURE — 85025 COMPLETE CBC W/AUTO DIFF WBC: CPT

## 2022-11-15 PROCEDURE — 36430 TRANSFUSION BLD/BLD COMPNT: CPT | Mod: XU

## 2022-11-15 PROCEDURE — 36415 COLL VENOUS BLD VENIPUNCTURE: CPT | Mod: XU

## 2022-11-15 PROCEDURE — 30233N1 TRANSFUSION OF NONAUTOLOGOUS RED BLOOD CELLS INTO PERIPHERAL VEIN, PERCUTANEOUS APPROACH: ICD-10-PCS | Performed by: HOSPITALIST

## 2022-11-15 PROCEDURE — 770020 HCHG ROOM/CARE - TELE (206)

## 2022-11-15 PROCEDURE — 86850 RBC ANTIBODY SCREEN: CPT

## 2022-11-15 PROCEDURE — 700111 HCHG RX REV CODE 636 W/ 250 OVERRIDE (IP): Performed by: HOSPITALIST

## 2022-11-15 PROCEDURE — 700102 HCHG RX REV CODE 250 W/ 637 OVERRIDE(OP): Performed by: HOSPITALIST

## 2022-11-15 PROCEDURE — P9016 RBC LEUKOCYTES REDUCED: HCPCS

## 2022-11-15 PROCEDURE — 80053 COMPREHEN METABOLIC PANEL: CPT

## 2022-11-15 PROCEDURE — 93005 ELECTROCARDIOGRAM TRACING: CPT | Performed by: EMERGENCY MEDICINE

## 2022-11-15 PROCEDURE — 700105 HCHG RX REV CODE 258: Performed by: EMERGENCY MEDICINE

## 2022-11-15 PROCEDURE — 36415 COLL VENOUS BLD VENIPUNCTURE: CPT

## 2022-11-15 PROCEDURE — A9270 NON-COVERED ITEM OR SERVICE: HCPCS | Performed by: HOSPITALIST

## 2022-11-15 PROCEDURE — 85027 COMPLETE CBC AUTOMATED: CPT | Mod: XU

## 2022-11-15 PROCEDURE — 99285 EMERGENCY DEPT VISIT HI MDM: CPT

## 2022-11-15 PROCEDURE — C9113 INJ PANTOPRAZOLE SODIUM, VIA: HCPCS | Performed by: HOSPITALIST

## 2022-11-15 PROCEDURE — 85018 HEMOGLOBIN: CPT | Mod: XU

## 2022-11-15 PROCEDURE — 86923 COMPATIBILITY TEST ELECTRIC: CPT

## 2022-11-15 PROCEDURE — 86900 BLOOD TYPING SEROLOGIC ABO: CPT

## 2022-11-15 PROCEDURE — 99223 1ST HOSP IP/OBS HIGH 75: CPT | Mod: AI | Performed by: HOSPITALIST

## 2022-11-15 PROCEDURE — 86901 BLOOD TYPING SEROLOGIC RH(D): CPT

## 2022-11-15 RX ORDER — BISACODYL 10 MG
10 SUPPOSITORY, RECTAL RECTAL
Status: DISCONTINUED | OUTPATIENT
Start: 2022-11-15 | End: 2022-11-16 | Stop reason: HOSPADM

## 2022-11-15 RX ORDER — AMOXICILLIN 250 MG
2 CAPSULE ORAL 2 TIMES DAILY
Status: DISCONTINUED | OUTPATIENT
Start: 2022-11-15 | End: 2022-11-16 | Stop reason: HOSPADM

## 2022-11-15 RX ORDER — OMEPRAZOLE 20 MG/1
20 CAPSULE, DELAYED RELEASE ORAL DAILY
Status: DISCONTINUED | OUTPATIENT
Start: 2022-11-16 | End: 2022-11-15

## 2022-11-15 RX ORDER — ONDANSETRON 4 MG/1
4 TABLET, ORALLY DISINTEGRATING ORAL EVERY 4 HOURS PRN
Status: DISCONTINUED | OUTPATIENT
Start: 2022-11-15 | End: 2022-11-16 | Stop reason: HOSPADM

## 2022-11-15 RX ORDER — ATORVASTATIN CALCIUM 40 MG/1
40 TABLET, FILM COATED ORAL
Status: DISCONTINUED | OUTPATIENT
Start: 2022-11-15 | End: 2022-11-16 | Stop reason: HOSPADM

## 2022-11-15 RX ORDER — PANTOPRAZOLE SODIUM 40 MG/10ML
40 INJECTION, POWDER, LYOPHILIZED, FOR SOLUTION INTRAVENOUS 2 TIMES DAILY
Status: DISCONTINUED | OUTPATIENT
Start: 2022-11-15 | End: 2022-11-16 | Stop reason: HOSPADM

## 2022-11-15 RX ORDER — ONDANSETRON 2 MG/ML
4 INJECTION INTRAMUSCULAR; INTRAVENOUS EVERY 4 HOURS PRN
Status: DISCONTINUED | OUTPATIENT
Start: 2022-11-15 | End: 2022-11-16 | Stop reason: HOSPADM

## 2022-11-15 RX ORDER — ACETAMINOPHEN 325 MG/1
650 TABLET ORAL EVERY 6 HOURS PRN
Status: DISCONTINUED | OUTPATIENT
Start: 2022-11-15 | End: 2022-11-16 | Stop reason: HOSPADM

## 2022-11-15 RX ORDER — METOPROLOL SUCCINATE 25 MG/1
25 TABLET, EXTENDED RELEASE ORAL EVERY EVENING
Status: DISCONTINUED | OUTPATIENT
Start: 2022-11-15 | End: 2022-11-16 | Stop reason: HOSPADM

## 2022-11-15 RX ORDER — POLYETHYLENE GLYCOL 3350 17 G/17G
1 POWDER, FOR SOLUTION ORAL
Status: DISCONTINUED | OUTPATIENT
Start: 2022-11-15 | End: 2022-11-16 | Stop reason: HOSPADM

## 2022-11-15 RX ORDER — AMLODIPINE BESYLATE 5 MG/1
5 TABLET ORAL DAILY
Status: DISCONTINUED | OUTPATIENT
Start: 2022-11-16 | End: 2022-11-16 | Stop reason: HOSPADM

## 2022-11-15 RX ORDER — SODIUM CHLORIDE 9 MG/ML
INJECTION, SOLUTION INTRAVENOUS CONTINUOUS
Status: ACTIVE | OUTPATIENT
Start: 2022-11-15 | End: 2022-11-15

## 2022-11-15 RX ADMIN — SODIUM CHLORIDE: 9 INJECTION, SOLUTION INTRAVENOUS at 12:55

## 2022-11-15 RX ADMIN — ATORVASTATIN CALCIUM 40 MG: 40 TABLET, FILM COATED ORAL at 21:34

## 2022-11-15 RX ADMIN — METOPROLOL SUCCINATE 25 MG: 25 TABLET, EXTENDED RELEASE ORAL at 17:43

## 2022-11-15 RX ADMIN — PANTOPRAZOLE SODIUM 40 MG: 40 INJECTION, POWDER, FOR SOLUTION INTRAVENOUS at 15:48

## 2022-11-15 ASSESSMENT — COPD QUESTIONNAIRES
DO YOU EVER COUGH UP ANY MUCUS OR PHLEGM?: NO/ONLY WITH OCCASIONAL COLDS OR INFECTIONS
DURING THE PAST 4 WEEKS HOW MUCH DID YOU FEEL SHORT OF BREATH: SOME OF THE TIME
HAVE YOU SMOKED AT LEAST 100 CIGARETTES IN YOUR ENTIRE LIFE: NO/DON'T KNOW
COPD SCREENING SCORE: 4

## 2022-11-15 ASSESSMENT — LIFESTYLE VARIABLES
ON A TYPICAL DAY WHEN YOU DRINK ALCOHOL HOW MANY DRINKS DO YOU HAVE: 0
EVER FELT BAD OR GUILTY ABOUT YOUR DRINKING: NO
AVERAGE NUMBER OF DAYS PER WEEK YOU HAVE A DRINK CONTAINING ALCOHOL: 0
EVER HAD A DRINK FIRST THING IN THE MORNING TO STEADY YOUR NERVES TO GET RID OF A HANGOVER: NO
HAVE YOU EVER FELT YOU SHOULD CUT DOWN ON YOUR DRINKING: NO
CONSUMPTION TOTAL: NEGATIVE
TOTAL SCORE: 0
SUBSTANCE_ABUSE: 0
TOTAL SCORE: 0
HAVE PEOPLE ANNOYED YOU BY CRITICIZING YOUR DRINKING: NO
ALCOHOL_USE: NO
TOTAL SCORE: 0
HOW MANY TIMES IN THE PAST YEAR HAVE YOU HAD 5 OR MORE DRINKS IN A DAY: 0

## 2022-11-15 ASSESSMENT — ENCOUNTER SYMPTOMS
COUGH: 0
MYALGIAS: 0
WHEEZING: 0
SORE THROAT: 0
CHILLS: 0
SENSORY CHANGE: 0
PALPITATIONS: 0
SPEECH CHANGE: 0
WEAKNESS: 1
LOSS OF CONSCIOUSNESS: 0
DIARRHEA: 0
ABDOMINAL PAIN: 0
SHORTNESS OF BREATH: 0
BACK PAIN: 0
DIZZINESS: 0
VOMITING: 0
CLAUDICATION: 0
EYE DISCHARGE: 0
FEVER: 0
DEPRESSION: 0
BRUISES/BLEEDS EASILY: 0
CONSTIPATION: 0
SPUTUM PRODUCTION: 0
HEADACHES: 0
HEMOPTYSIS: 0
FOCAL WEAKNESS: 0
NAUSEA: 0
NECK PAIN: 0
EYE PAIN: 0
DIAPHORESIS: 0

## 2022-11-15 ASSESSMENT — FIBROSIS 4 INDEX
FIB4 SCORE: 5.14
FIB4 SCORE: 2.69

## 2022-11-15 ASSESSMENT — COGNITIVE AND FUNCTIONAL STATUS - GENERAL
MOBILITY SCORE: 22
DRESSING REGULAR LOWER BODY CLOTHING: A LITTLE
CLIMB 3 TO 5 STEPS WITH RAILING: A LITTLE
SUGGESTED CMS G CODE MODIFIER DAILY ACTIVITY: CI
SUGGESTED CMS G CODE MODIFIER MOBILITY: CJ
WALKING IN HOSPITAL ROOM: A LITTLE
DAILY ACTIVITIY SCORE: 23

## 2022-11-15 ASSESSMENT — PATIENT HEALTH QUESTIONNAIRE - PHQ9
1. LITTLE INTEREST OR PLEASURE IN DOING THINGS: NOT AT ALL
SUM OF ALL RESPONSES TO PHQ9 QUESTIONS 1 AND 2: 0
2. FEELING DOWN, DEPRESSED, IRRITABLE, OR HOPELESS: NOT AT ALL

## 2022-11-15 NOTE — ED NOTES
Pt resting in stretcher with even, unlabored respirations. Denies additional needs at this time. Call light within reach.

## 2022-11-15 NOTE — ED NOTES
Pt updated on plan of care, denies questions or needs at this time. Respirations even and unlabored, alert, oriented. Call light within reach.

## 2022-11-15 NOTE — TELEPHONE ENCOUNTER
Recent labs show hemoglobin and hematocrit of 6.2 and 23.5.  Kidney function remains stable.  Iron levels are low she is not having any chest pain, shortness of breath or dizziness.  She is not having any pain.  We did discuss that I would like for her to go to the emergency room for further evaluation she states that she does not currently have a ride right now.  She can have her son take her tomorrow.  She is currently asymptomatic and we did discuss that if her symptoms change I would like her to call 911 immediately and go as soon as possible.  She verbalized understanding.

## 2022-11-15 NOTE — ED PROVIDER NOTES
ED Physician Note    Chief Concern:   Anemia    HPI:  Delphine Reeves is a very pleasant 89-year-old woman who presents to the emergency department sent from her primary care clinic out of concerns for hemoglobin of 6.2 found on outpatient routine lab work.  She is a past medical history significant for ST elevation MI in August 2022, this was not amenable to PCI, she was started on Plavix at that time.  She had a routine follow-up appointment about a month ago and outpatient lab work was ordered.  She was able to get the lab work done 5 days ago, hemoglobin resulted at 6.2 so she was contacted to come to the emergency department.  She reports no new shortness of breath, no fatigue, no lightheadedness.  She does have a past medical history significant for hypertension, currently on metoprolol and amlodipine.  She has had no syncope or near syncope.  Since time of discharge from the hospital in August she has had shortness of breath which has been unchanged, she also uses a walker for ambulation, but states that she has been steadily feeling a little more fatigued, and having a little more difficulty getting around since time of discharge, again with no acute changes.  She reports no recent fevers, no recent pneumonia, no chest pain, no thoracic back pain.  She reports no abnormal bleeding or bruising out of the ordinary, though she does get occasional bruises due to her Plavix.  She reports no recent bloody stools, no recent dark stools.  She states shortly after discharge from the hospital about 3 months ago she had a few episodes of stooling with a small amount of blood, however that has since resolved.    Review of Systems:  See HPI for pertinent positives and negatives. All other systems negative.    Past Medical History:   has a past medical history of GERD (gastroesophageal reflux disease), Hyperparathyroidism (HCC), and Hypertension.    Social History:  Social History     Tobacco Use    Smoking status: Never     Smokeless tobacco: Never   Vaping Use    Vaping Use: Never used   Substance and Sexual Activity    Alcohol use: No     Comment: holidays    Drug use: No    Sexual activity: Not Currently     Comment:  x 71 yrs, 3 sons, [facility]        Surgical History:   has a past surgical history that includes knee replacement, total (Left).    Current Medications:  Home Medications       Reviewed by Karl Field (Pharmacy Tech) on 11/15/22 at 1034  Med List Status: Complete     Medication Last Dose Status   amLODIPine (NORVASC) 5 MG Tab 11/15/2022 Active   atorvastatin (LIPITOR) 40 MG Tab 11/14/2022 Active   clopidogrel (PLAVIX) 75 MG Tab 11/15/2022 Active   metoprolol SR (TOPROL XL) 25 MG TABLET SR 24 HR 11/14/2022 Active   omeprazole (PRILOSEC) 20 MG delayed-release capsule 11/15/2022 Active                    Allergies:  No Known Allergies    Physical Exam:  Vital Signs: /40   Pulse (!) 43   Temp 36.9 °C (98.4 °F) (Oral)   Resp 16   Ht 1.524 m (5')   Wt 55.6 kg (122 lb 9.2 oz)   SpO2 92%   BMI 23.94 kg/m²   Constitutional: Alert, no acute distress  HENT: Normocephalic, mask in place  Eyes: Pupils equal and reactive, pale conjunctiva  Neck: Supple, normal range of motion, no stridor  Cardiovascular: Extremities are warm and well perfused, no murmur appreciated, normal cardiac auscultation, regular rhythm, rate 58  Pulmonary: No respiratory distress, normal work of breathing, no accessory muscule usage, breath sounds clear and equal bilaterally, no wheezing, no coarse breath sounds  Abdomen: Soft, non-distended, non-tender to palpation, no peritoneal signs  Skin: Warm, dry, no rashes or lesions excepting a few small scattered bruises, no petechiae, no purpura  Musculoskeletal: Normal range of motion in all extremities, no swelling or deformity noted  Neurologic: Alert, oriented, normal speech, normal motor function  Psychiatric: Normal and appropriate mood and affect    Medical records  reviewed for continuity of care. Ms. Reeves was seen in cardiology clinic 10/10/2022, APRN note reviewed from that visit.  She has a past medical history significant for hypertension, anemia, CKD stage IIIb follows with nephrology, and recent STEMI with 100% blockage of distal LAD, not amenable to PCI.  Left heart catheterization during hospitalization had been performed by Dr.al Mike.  She was also noted to be in atrial fibrillation with RVR on presentation to the emergency department.  EKG checked in clinic noted sinus bradycardia.  She continues on metoprolol.  Due to history of ST elevation MI she is currently on Plavix.  She also continues on atorvastatin.  Due to history of hypertension, she is on amlodipine.    She had outpatient lab work ordered about a month ago, and had difficulty completing that in a timely manner due to transportation issues.  She was able to get her outpatient lab work done, and was contacted by her primary care APRN yesterday out of concern for hemoglobin of 6.2 and hematocrit of 23.5.  She did not report any symptoms during her phone conversation yesterday.    EKG: Regular rhythm, rate 53, appears to be sinus rhythm though interpretation is significantly limited by baseline artifact.  Low voltage throughout all leads.    Labs:  Labs Reviewed   CBC WITH DIFFERENTIAL - Abnormal; Notable for the following components:       Result Value    RBC 2.86 (*)     Hemoglobin 6.1 (*)     Hematocrit 23.6 (*)     MCH 21.3 (*)     MCHC 25.8 (*)     RDW 57.9 (*)     Platelet Count 155 (*)     Neutrophils-Polys 43.40 (*)     Monocytes 18.00 (*)     Monos (Absolute) 0.86 (*)     All other components within normal limits   COMP METABOLIC PANEL - Abnormal; Notable for the following components:    Glucose 110 (*)     Calcium 8.2 (*)     All other components within normal limits   ESTIMATED GFR - Abnormal; Notable for the following components:    GFR (CKD-EPI) 42 (*)     All other components within normal  limits   COD (ADULT)   CBC WITHOUT DIFFERENTIAL   HGB   RELEASE RED BLOOD CELLS   TRANSFUSE RED BLOOD CELLS-NURSING COMMUNICATION (UNITS)       Radiology:  No orders to display      Differential diagnosis:  anemia, including blood loss anemia, GI bleed, laboratory error anemia    MDM:  Ms. Reeves presents to the emergency department today for evaluation of anemia discovered on outpatient lab testing.  On arrival to the emergency department she has no hypotension, no tachycardia, she reports no symptoms of lightheadedness, nor shortness of breath.  She is otherwise well-appearing, with exception of some progressively worsening fatigue over the past several weeks, she has had no acute changes.  Her vital signs are reassuring.     On laboratory evaluation CMP does not have any significant abnormalities, calcium is slightly low at 8.2.  She has a normal white blood count, however hemoglobin is 6.1 consistent with the values obtained on her outpatient lab work.  3 months ago, baseline hemoglobin appeared to be between 8 and 9, with her first hemoglobin below 7 resulting 5 days ago.    She does not report any ongoing dark or tar-like stools, no bright red blood per rectum, no vomiting or hematemesis.  She began Plavix in August, suspect she may have a small GI bleed contributing to her blood loss anemia.    This time is for admission to hospitalist service for blood transfusion.  Informed consent was discussed with the patient and her family member.  Occult blood testing will be ordered on an inpatient basis, at this time there is no evidence of rapid GI bleed, no evidence of hemodynamic instability therefore will require urgent or emergent GI consult.    Disposition:  Admit to hospitalist in stable condition    Final Impression:  1. Anemia, unspecified type

## 2022-11-15 NOTE — ASSESSMENT & PLAN NOTE
Patient presenting with symptoms of fatigue.  Hg 6.1 on admission  Transfuse for hg <7.    Hg q 6 hours x2.  GI consulted given patient will need to continue plavix for CAD.  CLD, npo at [facility].

## 2022-11-15 NOTE — DISCHARGE PLANNING
Met with pt who said that she lives alone in a one level home. She uses a rolator walker when leaving the house but she does not use any DMEs inside the house. Reports being independent with ADLs. She lost her drivers lisence recently so her son or ZEV supports her with transportation assistance. Son and DIL lives nearby where they are able to help her.     PCP is LUIS CARLOS Fitzgerald.   Uses [facility] in [place].     Care Transition Team Assessment    Information Source  Orientation Level: Oriented X4  Information Given By: Patient  Who is responsible for making decisions for patient? : Patient    Readmission Evaluation  Is this a readmission?: No    Elopement Risk  Legal Hold: No  Ambulatory or Self Mobile in Wheelchair: No-Not an Elopement Risk    Interdisciplinary Discharge Planning  Does Admitting Nurse Feel This Could be a Complex Discharge?: No  Primary Care Physician: Elana ALDRIDGE  Lives with - Patient's Self Care Capacity: Adult Children  Patient or legal guardian wants to designate a caregiver: No  Support Systems: Children  Housing / Facility: [address] [place] [address] House  Do You Take your Prescribed Medications Regularly: Yes  Able to Return to Previous ADL's: Yes  Mobility Issues: Yes  Prior Services: None, Home-Independent  Patient Prefers to be Discharged to:: Home  Assistance Needed: Yes  Durable Medical Equipment: Walker (has a rolator walker)    Discharge Preparedness  What is your plan after discharge?: Home with help  What are your discharge supports?: Child  Prior Functional Level: Ambulatory, Independent with Activities of Daily Living, Independent with Medication Management, Uses Walker  Difficulity with ADLs: None  Difficulity with IADLs: Driving    Functional Assesment  Prior Functional Level: Ambulatory, Independent with Activities of Daily Living, Independent with Medication Management, Uses Walker    Finances  Financial Barriers to Discharge: No  Prescription Coverage: Yes    Vision / Hearing  Impairment  Vision Impairment : Yes  Hearing Impairment : No    Values / Beliefs / Concerns  Values / Beliefs Concerns : No    Advance Directive  Advance Directive?: Living Will    Domestic Abuse  Have you ever been the victim of abuse or violence?: No         Discharge Risks or Barriers  Discharge risks or barriers?: Transportation  Patient risk factors: Vulnerable adult    Anticipated Discharge Information  Discharge Disposition: Discharged to home/self care (01)

## 2022-11-15 NOTE — H&P
Hospital Medicine History & Physical Note    Date of Service  11/15/2022    Primary Care Physician  MYNOR Vizcaino.    Consultants  GI    Specialist Names: Dr. Watson     Code Status  DNAR/DNI confirmed with patient on admission.  Advanced directive on file.    Chief Complaint  Chief Complaint   Patient presents with    Abnormal Labs       History of Presenting Illness  Delphine Reeves is a 89 y.o. female who presented 11/15/2022 with history of recent STEMI and left heart catheterization nonamenable to stent placement due to 100% blockage LAD August 2022 where she was noted to have occult blood stool positive and therefore not placed on anticoagulation for atrial fibrillation but was placed on Plavix presents today with fatigue and finding of a hemoglobin 6.1 in ER.  1 unit packed red blood cell transfusion ordered in ER.  Patient did have dark stool shortly after last admission in August but none since.  Guaiac rectal exam on admission Hemoccult negative.  No obvious hemorrhoids noted.  Patient started on Protonix she has been seen by [facility] in the past.  GI consulted given the fact that patient will need anticoagulation ongoing for her coronary artery disease.    I discussed the plan of care with patient.    Review of Systems  Review of Systems   Constitutional:  Positive for malaise/fatigue. Negative for chills, diaphoresis and fever.   HENT:  Negative for congestion and sore throat.    Eyes:  Negative for pain and discharge.   Respiratory:  Negative for cough, hemoptysis, sputum production, shortness of breath and wheezing.    Cardiovascular:  Negative for chest pain, palpitations, claudication and leg swelling.   Gastrointestinal:  Negative for abdominal pain, constipation, diarrhea, melena, nausea and vomiting.   Genitourinary:  Negative for dysuria, frequency and urgency.   Musculoskeletal:  Negative for back pain, joint pain, myalgias and neck pain.   Skin:  Negative for itching and  rash.   Neurological:  Positive for weakness. Negative for dizziness, sensory change, speech change, focal weakness, loss of consciousness and headaches.   Endo/Heme/Allergies:  Does not bruise/bleed easily.   Psychiatric/Behavioral:  Negative for depression, substance abuse and suicidal ideas.      Past Medical History   has a past medical history of GERD (gastroesophageal reflux disease), Hyperparathyroidism (HCC), and Hypertension.    Surgical History   has a past surgical history that includes knee replacement, total (Left).     Family History  family history includes Heart Disease in her brother, father, and mother; Lung Disease in her sister; No Known Problems in her brother.   Family history reviewed with patient. There is no family history that is pertinent to the chief complaint.     Social History   reports that she has never smoked. She has never used smokeless tobacco. She reports that she does not drink alcohol and does not use drugs.    Allergies  No Known Allergies    Medications  Prior to Admission Medications   Prescriptions Last Dose Informant Patient Reported? Taking?   amLODIPine (NORVASC) 5 MG Tab 11/15/2022 at 0700 Rx Bottle (For Med Information) No No   Sig: Take 1 Tablet by mouth every day.   atorvastatin (LIPITOR) 40 MG Tab 11/14/2022 at 2100 Rx Bottle (For Med Information) No No   Sig: Take 1 Tablet by mouth at bedtime.   clopidogrel (PLAVIX) 75 MG Tab 11/15/2022 at 0700 Rx Bottle (For Med Information) No No   Sig: Take 1 Tablet by mouth every day.   metoprolol SR (TOPROL XL) 25 MG TABLET SR 24 HR 11/14/2022 at 2100 Rx Bottle (For Med Information) No No   Sig: TAKE 1 TABLET BY MOUTH  DAILY   Patient taking differently: 1 Tablet every evening.   omeprazole (PRILOSEC) 20 MG delayed-release capsule 11/15/2022 at 0700 Rx Bottle (For Med Information) No No   Sig: TAKE 1 CAPSULE BY MOUTH  DAILY   Patient taking differently: Take 1 Capsule by mouth every day.      Facility-Administered  Medications: None       Physical Exam  Temp:  [36.1 °C (97 °F)-36.9 °C (98.4 °F)] 36.9 °C (98.4 °F)  Pulse:  [2-58] 43  Resp:  [16-19] 16  BP: (108-154)/(40-64) 116/40  SpO2:  [92 %-98 %] 92 %  Blood Pressure : (!) 151/60   Temperature: 36.1 °C (97 °F)   Pulse: (!) 51   Respiration: 19   Pulse Oximetry: 95 %       Physical Exam  Vitals and nursing note reviewed.   Constitutional:       General: She is not in acute distress.     Appearance: She is well-developed and normal weight. She is not diaphoretic.      Comments: pale   HENT:      Head: Normocephalic and atraumatic.      Nose: Nose normal.      Mouth/Throat:      Mouth: Mucous membranes are moist.      Pharynx: No oropharyngeal exudate.   Eyes:      General: No scleral icterus.        Right eye: No discharge.         Left eye: No discharge.      Conjunctiva/sclera: Conjunctivae normal.      Pupils: Pupils are equal, round, and reactive to light.   Neck:      Thyroid: No thyromegaly.      Vascular: No JVD.      Trachea: No tracheal deviation.   Cardiovascular:      Rate and Rhythm: Normal rate and regular rhythm.      Heart sounds: Normal heart sounds. No murmur heard.    No friction rub. No gallop.   Pulmonary:      Effort: Pulmonary effort is normal. No respiratory distress.      Breath sounds: Normal breath sounds. No stridor. No wheezing or rales.   Chest:      Chest wall: No tenderness.   Abdominal:      General: Bowel sounds are normal. There is no distension.      Palpations: Abdomen is soft. There is no mass.      Tenderness: There is no abdominal tenderness. There is no guarding or rebound.   Musculoskeletal:         General: No tenderness. Normal range of motion.      Cervical back: Normal range of motion and neck supple.   Lymphadenopathy:      Cervical: No cervical adenopathy.   Skin:     General: Skin is warm and dry.      Coloration: Skin is pale.      Findings: No erythema or rash.   Neurological:      General: No focal deficit present.       Mental Status: She is alert and oriented to person, place, and time.      Cranial Nerves: No cranial nerve deficit.      Motor: No abnormal muscle tone.      Coordination: Coordination normal.   Psychiatric:         Mood and Affect: Mood normal.         Behavior: Behavior normal.         Thought Content: Thought content normal.         Judgment: Judgment normal.       Laboratory:  Recent Labs     11/15/22  0920   WBC 4.8   RBC 2.86*   HEMOGLOBIN 6.1*   HEMATOCRIT 23.6*   MCV 82.5   MCH 21.3*   MCHC 25.8*   RDW 57.9*   PLATELETCT 155*   MPV 12.0     Recent Labs     11/15/22  0920   SODIUM 138   POTASSIUM 4.5   CHLORIDE 105   CO2 20   GLUCOSE 110*   BUN 20   CREATININE 1.21   CALCIUM 8.2*     Recent Labs     11/15/22  0920   ALTSGPT 9   ASTSGOT 26   ALKPHOSPHAT 63   TBILIRUBIN 0.6   GLUCOSE 110*         No results for input(s): NTPROBNP in the last 72 hours.      No results for input(s): TROPONINT in the last 72 hours.    Imaging:  No orders to display           Assessment/Plan:  Justification for Admission Status  I anticipate this patient will require at least two midnights for appropriate medical management, necessitating inpatient admission because close monitoring of Hg for repeat blood transfusions.    Patient will need a Telemetry bed on CARDIOLOGY service .  The need is secondary to recent STEMI, current acute blood loss.    * Acute blood loss anemia- (present on admission)  Assessment & Plan  Patient presenting with symptoms of fatigue.  Hg 6.1 on admission  Transfuse for hg <7.    Hg q 6 hours x2.  GI consulted given patient will need to continue plavix for CAD.  CLD, npo at [facility].    Paroxysmal A-fib (HCC)- (present on admission)  Assessment & Plan  Decision not to AC 8/2022 due to FOB + during that admission.    STEMI (ST elevation myocardial infarction) (HCC)- (present on admission)  Assessment & Plan  H/o STEMI 8/2022, [facility] with 100% occluded LAD.  No stents placed.  Started on plavix and lipitor.    Pure  hypercholesterolemia- (present on admission)  Assessment & Plan  Continue home lipitor.    Gastroesophageal reflux disease without esophagitis- (present on admission)  Assessment & Plan  protonix IV bid    Essential hypertension, benign- (present on admission)  Assessment & Plan  Continue home meds        VTE prophylaxis: SCDs/TEDs   Initial (On Arrival)

## 2022-11-15 NOTE — ED NOTES
Risks/benefits of blood transfusion explained to pt and family who verbalize understanding and deny questions. Consent form signed by patient. Pt aware of POC and deny needs at this time.

## 2022-11-15 NOTE — ED TRIAGE NOTES
Pt amb to triage w family, c/o abnormal labs drawn per pcp x5d ago for routine appt. Pt has low h/h. Pt denies wekness, dizziness, bleeding or tarry stool.

## 2022-11-15 NOTE — ASSESSMENT & PLAN NOTE
H/o STEMI 8/2022, [facility] with 100% occluded LAD.  No stents placed.  Started on plavix and lipitor.

## 2022-11-15 NOTE — ED NOTES
Med rec updated and complete, per pt   Allergies reviewed, per pt  Interviewed pt with family at bedside with permission from pt.  Pt had RX bottles at bedside, went over RX bottles and returned RX bottles back to pts family at bedside.   Pt reports no vitamins or OTC's.  Pt reports no antibiotics in the last 30 days

## 2022-11-15 NOTE — ED NOTES
Pt O2 sat 96% RA, however, pt with audible exp wheezing. Pt denies SOB. Respirations unlabored, pt in no distress. Hospitalist to be made aware, no PRN albuterol orders available.

## 2022-11-15 NOTE — CONSULTS
Gastroenterology Consult Note     Date of Consult: 11/15/2022  Referring Physician: Shine     Reason for consult: severe anemia        HPI: This is a 89 yo female with history of CAD, hyperparathyroidism, GERD and HTN who presented to the hospital because her PCP received blood work showing the patient was severely anemic. The patient says that she had STEMI in August. She was found to have 100% blockage of her LAD and a stent could not be placed. She was started on Plavix and noted some black stool for some days after being discharged from the hospital. She says that since that time she has been doing well. She denies chest pain, shortness of breath, decreased exercise tolerance, melena or hematochezia. She denies constipation or diarrhea and reports that she has not had any abdominal pain or heartburn. She was found to be guaiac positive in August but on admission today, she was Guaiac negative. She did have a partial colonoscopy in 2005 that was followed by a negative barium enema. She has not had another procedure since that time. We are consulted to help with evaluation of her anemia.     PMHX:  Past Medical History:   Diagnosis Date    GERD (gastroesophageal reflux disease)     Hyperparathyroidism (HCC)     Hypertension           PSurgHx:   Past Surgical History:   Procedure Laterality Date    KNEE REPLACEMENT, TOTAL Left         ALLERGIES:Patient has no known allergies.     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    Other Topics Concern    Not on file   Social History Narrative    Not on file     Social Determinants of Health     Financial Resource Strain: Not on file   Food Insecurity: Not on file   Transportation Needs: Not on file   Physical Activity: Not on file   Stress: Not on file   Social Connections: Not on file   Intimate Partner Violence: Not on file   Housing Stability: Not on file        FAMHx:   Family History   Problem Relation Age of Onset    Heart Disease Mother     Heart Disease Father     Lung Disease Sister     Heart Disease Brother     No Known Problems Brother     Cancer Neg Hx     Diabetes Neg Hx         ROS:  Constitutional: No fevers, chills, no night sweats, no weight changes  HEENT: no vision or hearing changes, no dry mouth, no change in smell  CARDIO: no palpitations, no orthopnea, no chest pain  PULM: no cough, no shortness of breath  NEURO: no Seizures, no memory impairment, no change in sensation  GI: as above  : no dysuria, no hematuria  HEME: no anemia, no easy brusing  MUSCULOSKELETAL: no muscle aches, no back pain, no arthritis  PSYCH: no anxiety or depression  SKIN: no rashes     PE:  Vitals:    11/15/22 1254 11/15/22 1309 11/15/22 1332 11/15/22 1345   BP: (!) 154/55 (!) 154/64 116/40    Pulse: (!) 2 (!) 53 (!) 43    Resp: 16  16    Temp: 36.4 °C (97.5 °F)  36.9 °C (98.4 °F)    TempSrc: Temporal  Oral    SpO2: 95% 94% 92%    Weight:    55.6 kg (122 lb 9.2 oz)   Height:         Gen: AAOx3, NAD, lying in bed  HEENT: PERRL, EOMI, nares patent, Mucous membranes moist  Neck: supple, no cervical or supraclavicular adenopathy  CVS: regular rhythm, normal rate, +murmur  Pulm: CTAB, no crackles  Abd: soft, Nd, NT, no guarding or rebound  Ext: no edema, normal sensation  NEURO: grossly normal, no weakness  Skin: warm, no rash  Psych: normal Affect, no anxiety     LABS:  Lab Results   Component Value Date/Time    SODIUM 138 11/15/2022 09:20 AM     POTASSIUM 4.5 11/15/2022 09:20 AM    CHLORIDE 105 11/15/2022 09:20 AM    CO2 20 11/15/2022 09:20 AM    GLUCOSE 110 (H) 11/15/2022 09:20 AM    BUN 20 11/15/2022 09:20 AM    CREATININE 1.21 11/15/2022 09:20 AM      Lab Results   Component Value Date/Time    WBC 4.8 11/15/2022 09:20 AM    RBC 2.86 (L) 11/15/2022 09:20 AM    HEMOGLOBIN 6.1 (L) 11/15/2022 09:20 AM    HEMATOCRIT 23.6 (L) 11/15/2022 09:20 AM    MCV 82.5 11/15/2022 09:20 AM    MCH 21.3 (L) 11/15/2022 09:20 AM    MCHC 25.8 (L) 11/15/2022 09:20 AM    MPV 12.0 11/15/2022 09:20 AM    NEUTSPOLYS 43.40 (L) 11/15/2022 09:20 AM    LYMPHOCYTES 38.00 11/15/2022 09:20 AM    MONOCYTES 18.00 (H) 11/15/2022 09:20 AM    EOSINOPHILS 0.40 11/15/2022 09:20 AM    BASOPHILS 0.00 11/15/2022 09:20 AM    HYPOCHROMIA 1+ 11/10/2022 12:13 PM    ANISOCYTOSIS 1+ 11/10/2022 12:13 PM        Lab Results   Component Value Date/Time    PROTHROMBTM 18.5 (H) 08/06/2022 10:11 PM    INR 1.57 (H) 08/06/2022 10:11 PM      Recent Labs     11/10/22  1208 11/10/22  1212 11/15/22  0920   ASTSGOT 14 15 26   ALTSGPT 9 10 9   TBILIRUBIN 0.6 0.6 0.6   GLOBULIN 2.5 2.6 2.3          Problem List Items Addressed This Visit    None  Visit Diagnoses       Anemia, unspecified type                 ASSESSMENT: This is a 89 yo female with history of CAD on Plavix for 100% blockage of the LAD. She would be high risk for sedation and procedures given her heart disease. She had discussed undergoing a colonoscopy previously with her PCP and her PCP advised against having this done. Therefore, the patient does not want to do a colonoscopy. She says that she also does not want to have an EGD done at this time. She wants to be conservative and get transfused and monitor as outpatient.     PLAN:   1) No EGD or colonoscopy at patient's request  2) check iron studies and give iron infusion if deficient  3) Would not stop Plavix at this time as there is no obvious ongoing GI blood loss and patient has a compelling reason  to continue the Plavix  4) Consider non-invasive imaging - upper GI and barium enema. Patient does not want these right now either  5) patient should f/u with her PCP regularly to monitor her Hb    Please call if patient changes her mind or if there is evidence of an active GI bleed      Thank you for this consult.     Ag Watson MD

## 2022-11-16 ENCOUNTER — PATIENT OUTREACH (OUTPATIENT)
Dept: SCHEDULING | Facility: IMAGING CENTER | Age: 87
End: 2022-11-16
Payer: MEDICARE

## 2022-11-16 VITALS
SYSTOLIC BLOOD PRESSURE: 149 MMHG | WEIGHT: 122.58 LBS | OXYGEN SATURATION: 95 % | DIASTOLIC BLOOD PRESSURE: 53 MMHG | RESPIRATION RATE: 18 BRPM | HEART RATE: 53 BPM | HEIGHT: 60 IN | TEMPERATURE: 97.5 F | BODY MASS INDEX: 24.07 KG/M2

## 2022-11-16 LAB
ANION GAP SERPL CALC-SCNC: 10 MMOL/L (ref 7–16)
BASOPHILS # BLD AUTO: 0.3 % (ref 0–1.8)
BASOPHILS # BLD: 0.01 K/UL (ref 0–0.12)
BUN SERPL-MCNC: 17 MG/DL (ref 8–22)
CALCIUM SERPL-MCNC: 7.9 MG/DL (ref 8.4–10.2)
CHLORIDE SERPL-SCNC: 111 MMOL/L (ref 96–112)
CO2 SERPL-SCNC: 21 MMOL/L (ref 20–33)
COMMENT 1642: NORMAL
CREAT SERPL-MCNC: 0.99 MG/DL (ref 0.5–1.4)
EOSINOPHIL # BLD AUTO: 0 K/UL (ref 0–0.51)
EOSINOPHIL NFR BLD: 0 % (ref 0–6.9)
ERYTHROCYTE [DISTWIDTH] IN BLOOD BY AUTOMATED COUNT: 55.9 FL (ref 35.9–50)
GFR SERPLBLD CREATININE-BSD FMLA CKD-EPI: 53 ML/MIN/1.73 M 2
GLUCOSE SERPL-MCNC: 82 MG/DL (ref 65–99)
HCT VFR BLD AUTO: 24.6 % (ref 37–47)
HGB BLD-MCNC: 7 G/DL (ref 12–16)
IMM GRANULOCYTES # BLD AUTO: 0.02 K/UL (ref 0–0.11)
IMM GRANULOCYTES NFR BLD AUTO: 0.6 % (ref 0–0.9)
LYMPHOCYTES # BLD AUTO: 1.12 K/UL (ref 1–4.8)
LYMPHOCYTES NFR BLD: 31.2 % (ref 22–41)
MCH RBC QN AUTO: 23.4 PG (ref 27–33)
MCHC RBC AUTO-ENTMCNC: 28.5 G/DL (ref 33.6–35)
MCV RBC AUTO: 82.3 FL (ref 81.4–97.8)
MONOCYTES # BLD AUTO: 0.77 K/UL (ref 0–0.85)
MONOCYTES NFR BLD AUTO: 21.4 % (ref 0–13.4)
NEUTROPHILS # BLD AUTO: 1.67 K/UL (ref 2–7.15)
NEUTROPHILS NFR BLD: 46.5 % (ref 44–72)
NRBC # BLD AUTO: 0 K/UL
NRBC BLD-RTO: 0 /100 WBC
PLATELET # BLD AUTO: 113 K/UL (ref 164–446)
PMV BLD AUTO: 11.4 FL (ref 9–12.9)
POTASSIUM SERPL-SCNC: 4.1 MMOL/L (ref 3.6–5.5)
RBC # BLD AUTO: 2.99 M/UL (ref 4.2–5.4)
SODIUM SERPL-SCNC: 142 MMOL/L (ref 135–145)
WBC # BLD AUTO: 3.6 K/UL (ref 4.8–10.8)

## 2022-11-16 PROCEDURE — 85025 COMPLETE CBC W/AUTO DIFF WBC: CPT

## 2022-11-16 PROCEDURE — 80048 BASIC METABOLIC PNL TOTAL CA: CPT

## 2022-11-16 PROCEDURE — A9270 NON-COVERED ITEM OR SERVICE: HCPCS | Performed by: HOSPITALIST

## 2022-11-16 PROCEDURE — 36415 COLL VENOUS BLD VENIPUNCTURE: CPT

## 2022-11-16 PROCEDURE — 99239 HOSP IP/OBS DSCHRG MGMT >30: CPT | Performed by: HOSPITALIST

## 2022-11-16 PROCEDURE — 700111 HCHG RX REV CODE 636 W/ 250 OVERRIDE (IP): Performed by: HOSPITALIST

## 2022-11-16 PROCEDURE — 700102 HCHG RX REV CODE 250 W/ 637 OVERRIDE(OP): Performed by: HOSPITALIST

## 2022-11-16 PROCEDURE — C9113 INJ PANTOPRAZOLE SODIUM, VIA: HCPCS | Performed by: HOSPITALIST

## 2022-11-16 RX ADMIN — AMLODIPINE BESYLATE 5 MG: 5 TABLET ORAL at 06:00

## 2022-11-16 RX ADMIN — PANTOPRAZOLE SODIUM 40 MG: 40 INJECTION, POWDER, FOR SOLUTION INTRAVENOUS at 06:00

## 2022-11-16 RX ADMIN — SENNOSIDES AND DOCUSATE SODIUM 2 TABLET: 50; 8.6 TABLET ORAL at 06:00

## 2022-11-16 ASSESSMENT — PAIN DESCRIPTION - PAIN TYPE: TYPE: ACUTE PAIN

## 2022-11-16 NOTE — CARE PLAN
The patient is Stable - Low risk of patient condition declining or worsening    Shift Goals  Clinical Goals: rest and comfort  Patient Goals: to go home    Progress made toward(s) clinical / shift goals:  not on physical or respiratory distress.    Patient is not progressing towards the following goals:

## 2022-11-16 NOTE — CARE PLAN
The patient is Stable - Low risk of patient condition declining or worsening    Shift Goals  Clinical Goals: monitor labs, discharge  Patient Goals: go home    Progress made toward(s) clinical / shift goals:  labs stable this morning, patient cleared to discharge home with close follow-up and repeat labs with PCP. Patient states all questions and concerns have been answered by MD and nursing staff.      Problem: Knowledge Deficit - Standard  Goal: Patient and family/care givers will demonstrate understanding of plan of care, disease process/condition, diagnostic tests and medications  Outcome: Met       Patient is not progressing towards the following goals:

## 2022-11-16 NOTE — DISCHARGE SUMMARY
Discharge Summary    CHIEF COMPLAINT ON ADMISSION  Chief Complaint   Patient presents with    Abnormal Labs       Reason for Admission  Abnormal Labs     Admission Date  11/15/2022    CODE STATUS  DNAR/DNI    HPI & HOSPITAL COURSE  Ms. Delphine Gomez is a 89-year-old female who was in her usual state of health when she had some routine blood work done.  The patient's blood work shows that she is profoundly anemic with a hemoglobin of 6.2, repeat level was 6.4 and then when she came to the emergency room as she was instructed by her primary care physician and was Stom 6.1.  Patient was transfused 1 unit of packed red blood cells and her hemoglobin is up to 7.  Gastroenterology was consulted.  They have recommended that the patient undergo EGD colonoscopy which she adamantly refused.  Gastroenterology then told the patient was reasonable to perform noninvasive studies like upper GI evaluation or barium enema.  Patient adamantly refused these as well.  Gastroenterology after this signed off.  Patient currently is at her baseline, she has no complaints.  She is alert awake oriented x3 pleasant cooperative female understands her plan and treatment.  She says she is feeling great and would like to go home with follow-ups with her primary care physician which will be arranged.    Therefore, she is discharged in good and stable condition to home with organized home healthcare and close outpatient follow-up.    The patient recovered much more quickly than anticipated on admission.    Discharge Date  11/16/2022    FOLLOW UP ITEMS POST DISCHARGE  Follow-up with the primary care physician in 2 to 3 days    DISCHARGE DIAGNOSES  Principal Problem:    Acute blood loss anemia POA: Yes  Active Problems:    Essential hypertension, benign POA: Yes    Gastroesophageal reflux disease without esophagitis POA: Yes    Pure hypercholesterolemia POA: Yes    STEMI (ST elevation myocardial infarction) (HCC) POA: Yes    Paroxysmal A-fib (HCC)  POA: Yes  Resolved Problems:    * No resolved hospital problems. *      FOLLOW UP  Future Appointments   Date Time Provider Department Center   12/6/2022  8:20 AM RAQUEL Vizcaino     No follow-up provider specified.    MEDICATIONS ON DISCHARGE     Medication List        CHANGE how you take these medications        Instructions   metoprolol SR 25 MG Tb24  What changed:   how to take this  when to take this  Commonly known as: TOPROL XL   Doctor's comments: Requesting 1 year supply  TAKE 1 TABLET BY MOUTH  DAILY            CONTINUE taking these medications        Instructions   amLODIPine 5 MG Tabs  Commonly known as: NORVASC   Take 1 Tablet by mouth every day.  Dose: 5 mg     atorvastatin 40 MG Tabs  Commonly known as: LIPITOR   Take 1 Tablet by mouth at bedtime.  Dose: 40 mg     clopidogrel 75 MG Tabs  Commonly known as: PLAVIX   Take 1 Tablet by mouth every day.  Dose: 75 mg     omeprazole 20 MG delayed-release capsule  Commonly known as: PRILOSEC   Doctor's comments: Requesting 1 year supply  TAKE 1 CAPSULE BY MOUTH  DAILY              Allergies  No Known Allergies    DIET  Orders Placed This Encounter   Procedures    Diet Order Diet: Clear Liquid     Standing Status:   Standing     Number of Occurrences:   1     Order Specific Question:   Diet:     Answer:   Clear Liquid [10]       ACTIVITY  As tolerated.  Weight bearing as tolerated    CONSULTATIONS  Gastroenterology    PROCEDURES  None    LABORATORY  Lab Results   Component Value Date    SODIUM 142 11/16/2022    POTASSIUM 4.1 11/16/2022    CHLORIDE 111 11/16/2022    CO2 21 11/16/2022    GLUCOSE 82 11/16/2022    BUN 17 11/16/2022    CREATININE 0.99 11/16/2022        Lab Results   Component Value Date    WBC 3.6 (L) 11/16/2022    HEMOGLOBIN 7.0 (L) 11/16/2022    HEMATOCRIT 24.6 (L) 11/16/2022    PLATELETCT 113 (L) 11/16/2022        Total time of the discharge process exceeds 37 minutes.

## 2022-11-16 NOTE — DISCHARGE INSTRUCTIONS
Discharge Instructions    Discharged to home by car with relative. Discharged via wheelchair, hospital escort: Yes.  Special equipment needed: Not Applicable    Be sure to schedule a follow-up appointment with your primary care doctor or any specialists as instructed.     Discharge Plan:   Diet Plan: Discussed  Activity Level: Discussed  Confirmed Follow up Appointment: Appointment Scheduled  Confirmed Symptoms Management: Discussed  Medication Reconciliation Updated: Yes  Influenza Vaccine Indication: Not indicated: Previously immunized this influenza season and > 8 years of age    I understand that a diet low in cholesterol, fat, and sodium is recommended for good health. Unless I have been given specific instructions below for another diet, I accept this instruction as my diet prescription.   Other diet: as tolerated    Special Instructions: None    -Is this patient being discharged with medication to prevent blood clots?  No    Is patient discharged on Warfarin / Coumadin?   No

## 2022-11-16 NOTE — DISCHARGE PLANNING
Case Management Discharge Planning    Admission Date: 11/15/2022  GMLOS: 2.4  ALOS: 1    6-Clicks ADL Score: 23  6-Clicks Mobility Score: 22      Anticipated Discharge Dispo: Discharge Disposition: Discharged to home/self care (01)    DME Needed: No    Action(s) Taken: LSW completed chart review. Discussed pt in morning rounds. Per MD, pt is cleared to d/c today. Pt has a 6-clicks score of 22/23. No needs reported or identified at this time.     Escalations Completed: None    Medically Clear: Yes    Next Steps: LSW to follow and assist as needed.    Barriers to Discharge: None    Is the patient up for discharge tomorrow: No, today.

## 2022-11-16 NOTE — PROGRESS NOTES
Telemetry Shift Summary    Rhythm SB  HR Range 53  Ectopy rPVC  Measurements 0.12/0.08/0.44        Normal Values  Rhythm SR  HR Range    Measurements 0.12-0.20 / 0.06-0.10  / 0.30-0.52

## 2022-11-16 NOTE — PROGRESS NOTES
Telemetry Shift Summary    Rhythm SB  HR Range 50-52  Ectopy rPVC  Measurements .14/.08/.34        Normal Values  Rhythm SR  HR Range    Measurements 0.12-0.20 / 0.06-0.10  / 0.30-0.52

## 2022-11-17 ENCOUNTER — PATIENT OUTREACH (OUTPATIENT)
Dept: MEDICAL GROUP | Facility: PHYSICIAN GROUP | Age: 87
End: 2022-11-17
Payer: MEDICARE

## 2022-11-17 NOTE — PROGRESS NOTES
Subjective:     Delphine Reeves is a 89 y.o. female who presents for Hospital Follow-up with her daughter-in-law.    POST DISCHARGE CALL:  Discharge Date:11/16/2022   Date of Outreach Call: 11/17/2022 10:43 AM  Now that you're home, how are you doing? Very Good  Did you receive any new prescriptions? Yes  Were you able to fill those medications? Yes  How did you fill those prescriptions? *  If not able to fill prescriptions, why? *    Do you have questions about your medications? No  Do you have a follow-up appointment scheduled?Yes  Any issues or paperwork you wish to discuss with your PCP? No  Does patient qualify for CCM Program? No  If patient qualifies, was CCM Program Introduced? *  If patient does not qualify, comment? *  Number of Attempts: 2  Current or previous attempts completed within two business days of discharge? Yes  Provided education regarding treatment plan, medication, self-management, ADLs? Yes  Has patient completed Advance Directive? If yes, advise them to bring to appointment. Yes  Care Manager phone number provided? No  Is there anything else I can help you with? No  Chief Complaint? Abnormal lab work  Admitting Dx? Acute blood loss anemia  Discharge Diagnosis? Acute blood loss anemia  Additional Comments? *    HPI:   Recently hospitalized for anemia and abnormal labs. Patient had routine lab work which showed an H&H of 6.2/23.5.  She was asymptomatic but instructed to be seen at the emergency room.  She did not have a ride at the time of the call in the evening and stated that she would go to the emergency room in the morning.  During her hospitalization she did receive 1 unit transfusion of packed red blood cells.  She states that she tolerated the blood transfusion.  Gastroenterology was consulted and recommended EGD and colonoscopy.  Patient did refuse to have either procedure.  After 1 unit transfusion and at discharge her H&H did was 7.8 and 29.1. No new medications were prescribed. She  continues with plavix 75 mg daily after recent STEMI 8/2022. She has not any hematuria, bloody stools, epistaxis, bleeding gums, chest pain, shortness of breath or difficulty breathing.  She does note that she completed recent labs this morning the does show improvement in her H&H of 7.8/29.1.    Current medicines (including reconciliation performed today)  Current Outpatient Medications   Medication Sig Dispense Refill    famotidine (PEPCID) 40 MG Tab       ferrous sulfate 325 (65 Fe) MG tablet Take 1 Tablet by mouth every Monday, Wednesday, and Friday. 36 Tablet 1    omeprazole (PRILOSEC) 20 MG delayed-release capsule TAKE 1 CAPSULE BY MOUTH  DAILY (Patient taking differently: Take 20 mg by mouth every day.) 90 Capsule 3    metoprolol SR (TOPROL XL) 25 MG TABLET SR 24 HR TAKE 1 TABLET BY MOUTH  DAILY (Patient taking differently: 25 mg every evening.) 90 Tablet 3    amLODIPine (NORVASC) 5 MG Tab Take 1 Tablet by mouth every day. 90 Tablet 3    clopidogrel (PLAVIX) 75 MG Tab Take 1 Tablet by mouth every day. 90 Tablet 1    atorvastatin (LIPITOR) 40 MG Tab Take 1 Tablet by mouth at bedtime. 90 Tablet 1     No current facility-administered medications for this visit.       Allergies:   Patient has no known allergies.    Social History     Tobacco Use    Smoking status: Never    Smokeless tobacco: Never   Vaping Use    Vaping Use: Never used   Substance Use Topics    Alcohol use: No     Comment: holidays    Drug use: No       ROS:  Per HPI    Objective:     Vitals:    11/21/22 1513   BP: 136/50   BP Location: Left arm   Patient Position: Sitting   BP Cuff Size: Adult   Pulse: (!) 58   Temp: 35.8 °C (96.5 °F)   TempSrc: Temporal   SpO2: 99%   Weight: 54.9 kg (121 lb)   Height: 1.524 m (5')     Body mass index is 23.63 kg/m².    Physical Exam:  Gen: Alert and oriented, No apparent distress.  Daughter-in-law present in exam room.  Lungs: Normal effort, CTA bilaterally, no wheezes, rhonchi, or rales.    CV: Regular rate  and rhythm with systolic murmur.  No rubs or gallops.  Ext: Using four-wheel walker with ambulation.      Assessment and Plan:     1. Hospital discharge follow-up  Acute uncomplicated problem.  Hospital discharge follow-up completed today.  I did review her hospital records, labs and discussed with her her recent labs from this morning.  Patient and her daughter-in-law questions were answered today.  She continues to be asymptomatic today.  We will plan to check updated labs in 2 months.  See #2 below.    2. Other iron deficiency anemia  Chronic exacerbated problem.  Chart review shows improvement in recent H&H after 1 unit of packed red blood cell transfusion.  Iron levels remain on the low side we will start her on ferrous sulfate 325 mg every other day on Monday, Wednesday and Friday.  She will stay hydrated.  Discussed side effects of medication.  Plan to recheck updated labs in about 2 months.  She will return for follow-up results.  Again she is asymptomatic today.  Continue activity as tolerated.  Continue diet as tolerated.  - CBC WITH DIFFERENTIAL; Future  - IRON/TOTAL IRON BIND; Future  - FERRITIN; Future  - ferrous sulfate 325 (65 Fe) MG tablet; Take 1 Tablet by mouth every Monday, Wednesday, and Friday.  Dispense: 36 Tablet; Refill: 1        - Chart and discharge summary were reviewed.   - Hospitalization and results reviewed with patient.   - Medications reviewed including instructions regarding high risk medications, dosing and side effects.  - Recommended Services: No services needed at this time  - Advance directive/POLST on file?  Yes    Follow-up:Return in about 10 weeks (around 1/30/2023).    Face-to-face transitional care management services with HIGH (today's visit is within days post discharge & LACE+ score 59+) medical decision complexity were provided.     LACE+ Historical Score Over Time (0-28: Low, 29-58: Medium, 59+: High): 65

## 2022-11-17 NOTE — PROGRESS NOTES
Patient discharged home. IV removed and tele box removed and returned to monitor tech. Patient states all questions and concerns have been addressed appropriately. Verbalized understanding of discharge instructions and to follow up with PCP on 11/21/22 along with CBC lab work. New prescriptions sent to [facility].     Patient off the unit with NAEEM Dubois.     Telemetry Shift Summary    Rhythm SB  HR Range 49-53  Ectopy rPVC, rBig  Measurements 0.12/0.06/0.40      Normal Values  Rhythm SR  HR Range:   Measurements: 0.12-0.20/0.06-0.10/0.30-0.52

## 2022-11-18 ENCOUNTER — PATIENT OUTREACH (OUTPATIENT)
Dept: HEALTH INFORMATION MANAGEMENT | Facility: OTHER | Age: 87
End: 2022-11-18
Payer: MEDICARE

## 2022-11-18 NOTE — PROGRESS NOTES
11/18/2022  CHW called patient post discharge to introduce CCM services. Patient would like to think about it and requested a call back on Tuesday as she has an upcoming PCP appointment on Monday. Patient is also eligible for Chronic Care program. CHW will inform [facility] CHW Mariann.    11/22/2022  CHW called patient to discuss CCM. Patient stated that she has been busy and hasn't had a chance to think it over. Patient requested call back on Monday 11/28.    11/28/2022  Patient declined.

## 2022-11-21 ENCOUNTER — HOSPITAL ENCOUNTER (OUTPATIENT)
Dept: LAB | Facility: MEDICAL CENTER | Age: 87
End: 2022-11-21
Attending: HOSPITALIST
Payer: MEDICARE

## 2022-11-21 ENCOUNTER — OFFICE VISIT (OUTPATIENT)
Dept: MEDICAL GROUP | Facility: PHYSICIAN GROUP | Age: 87
End: 2022-11-21
Payer: MEDICARE

## 2022-11-21 VITALS
BODY MASS INDEX: 23.75 KG/M2 | HEART RATE: 58 BPM | HEIGHT: 60 IN | DIASTOLIC BLOOD PRESSURE: 50 MMHG | WEIGHT: 121 LBS | OXYGEN SATURATION: 99 % | SYSTOLIC BLOOD PRESSURE: 136 MMHG | TEMPERATURE: 96.5 F

## 2022-11-21 DIAGNOSIS — D62 ACUTE BLOOD LOSS ANEMIA: ICD-10-CM

## 2022-11-21 DIAGNOSIS — Z09 HOSPITAL DISCHARGE FOLLOW-UP: Primary | ICD-10-CM

## 2022-11-21 DIAGNOSIS — D50.8 OTHER IRON DEFICIENCY ANEMIA: ICD-10-CM

## 2022-11-21 PROBLEM — D50.9 IRON DEFICIENCY ANEMIA: Status: ACTIVE | Noted: 2022-09-08

## 2022-11-21 LAB
ERYTHROCYTE [DISTWIDTH] IN BLOOD BY AUTOMATED COUNT: 72.7 FL (ref 35.9–50)
HCT VFR BLD AUTO: 29.1 % (ref 37–47)
HGB BLD-MCNC: 7.8 G/DL (ref 12–16)
MCH RBC QN AUTO: 23.6 PG (ref 27–33)
MCHC RBC AUTO-ENTMCNC: 26.8 G/DL (ref 33.6–35)
MCV RBC AUTO: 88.2 FL (ref 81.4–97.8)
PLATELET # BLD AUTO: 139 K/UL (ref 164–446)
PMV BLD AUTO: 12.6 FL (ref 9–12.9)
RBC # BLD AUTO: 3.3 M/UL (ref 4.2–5.4)
WBC # BLD AUTO: 4.5 K/UL (ref 4.8–10.8)

## 2022-11-21 PROCEDURE — 36415 COLL VENOUS BLD VENIPUNCTURE: CPT

## 2022-11-21 PROCEDURE — 85027 COMPLETE CBC AUTOMATED: CPT

## 2022-11-21 PROCEDURE — 99496 TRANSJ CARE MGMT HIGH F2F 7D: CPT | Performed by: NURSE PRACTITIONER

## 2022-11-21 RX ORDER — FERROUS SULFATE 325(65) MG
325 TABLET ORAL
Qty: 36 TABLET | Refills: 1 | Status: SHIPPED | OUTPATIENT
Start: 2022-11-21 | End: 2023-06-27 | Stop reason: SDUPTHER

## 2022-11-21 RX ORDER — FAMOTIDINE 40 MG/1
TABLET, FILM COATED ORAL
COMMUNITY
Start: 2022-10-14 | End: 2022-12-12

## 2022-11-21 ASSESSMENT — FIBROSIS 4 INDEX: FIB4 SCORE: 5.74

## 2022-12-12 ENCOUNTER — TELEPHONE (OUTPATIENT)
Dept: MEDICAL GROUP | Facility: PHYSICIAN GROUP | Age: 87
End: 2022-12-12
Payer: MEDICARE

## 2022-12-12 DIAGNOSIS — K21.9 GASTROESOPHAGEAL REFLUX DISEASE WITHOUT ESOPHAGITIS: ICD-10-CM

## 2022-12-12 RX ORDER — PANTOPRAZOLE SODIUM 20 MG/1
20 TABLET, DELAYED RELEASE ORAL DAILY
Qty: 90 TABLET | Refills: 1 | Status: SHIPPED | OUTPATIENT
Start: 2022-12-12 | End: 2023-05-08

## 2022-12-12 NOTE — TELEPHONE ENCOUNTER
Elana,  [facility] sent  a new prescription request for Omeprazole. This rx is already in pt's chart with refills. I called [facility] back and the pharmacist explained that it came up as a drug interaction with the Plavix. He stated that Pantoprazole does not have an interaction with the Plavix. They need either a new prescription or a call back letting them know it is ok to dispense the Omeprazole. [employer] [employer] number is [phone].

## 2023-01-14 NOTE — ED NOTES
Lab called with critical result of Trop 1694 at 1814. Critical lab result read back to Lab.   Dr. Claire notified of critical lab result at 1814.  Critical lab result read back by Dr. Claire.   [Ambulatory and capable of all self care but unable to carry out any work activities] : Status 2- Ambulatory and capable of all self care but unable to carry out any work activities. Up and about more than 50% of waking hours [Normal] : affect appropriate [de-identified] : No icterus [de-identified] : Supple No LAD [de-identified] : Distant BS [de-identified] : S1 S2 [de-identified] : LE edema L>R 1+ [de-identified] : No spine/CVA tenderness [de-identified] : Ambulatory

## 2023-01-19 DIAGNOSIS — I21.02 ST ELEVATION MYOCARDIAL INFARCTION INVOLVING LEFT ANTERIOR DESCENDING (LAD) CORONARY ARTERY (HCC): ICD-10-CM

## 2023-01-20 ENCOUNTER — HOSPITAL ENCOUNTER (OUTPATIENT)
Dept: LAB | Facility: MEDICAL CENTER | Age: 88
End: 2023-01-20
Attending: NURSE PRACTITIONER
Payer: MEDICARE

## 2023-01-20 DIAGNOSIS — D50.8 OTHER IRON DEFICIENCY ANEMIA: ICD-10-CM

## 2023-01-20 LAB
25(OH)D3 SERPL-MCNC: 56 NG/ML (ref 30–100)
ALBUMIN SERPL BCP-MCNC: 4.4 G/DL (ref 3.2–4.9)
ALBUMIN/GLOB SERPL: 1.8 G/DL
ALP SERPL-CCNC: 88 U/L (ref 30–99)
ALT SERPL-CCNC: 9 U/L (ref 2–50)
ANION GAP SERPL CALC-SCNC: 13 MMOL/L (ref 7–16)
APPEARANCE UR: ABNORMAL
AST SERPL-CCNC: 18 U/L (ref 12–45)
BACTERIA #/AREA URNS HPF: ABNORMAL /HPF
BASOPHILS # BLD AUTO: 0 % (ref 0–1.8)
BASOPHILS # BLD AUTO: 0 % (ref 0–1.8)
BASOPHILS # BLD: 0 K/UL (ref 0–0.12)
BASOPHILS # BLD: 0 K/UL (ref 0–0.12)
BILIRUB SERPL-MCNC: 0.6 MG/DL (ref 0.1–1.5)
BILIRUB UR QL STRIP.AUTO: NEGATIVE
BUN SERPL-MCNC: 21 MG/DL (ref 8–22)
BURR CELLS BLD QL SMEAR: NORMAL
BURR CELLS BLD QL SMEAR: NORMAL
CALCIUM ALBUM COR SERPL-MCNC: 7.7 MG/DL (ref 8.5–10.5)
CALCIUM SERPL-MCNC: 8 MG/DL (ref 8.5–10.5)
CHLORIDE SERPL-SCNC: 101 MMOL/L (ref 96–112)
CHOLEST SERPL-MCNC: 112 MG/DL (ref 100–199)
CO2 SERPL-SCNC: 25 MMOL/L (ref 20–33)
COLOR UR: YELLOW
CREAT SERPL-MCNC: 1.2 MG/DL (ref 0.5–1.4)
CREAT UR-MCNC: 97.31 MG/DL
CREAT UR-MCNC: 97.69 MG/DL
EOSINOPHIL # BLD AUTO: 0 K/UL (ref 0–0.51)
EOSINOPHIL # BLD AUTO: 0 K/UL (ref 0–0.51)
EOSINOPHIL NFR BLD: 0 % (ref 0–6.9)
EOSINOPHIL NFR BLD: 0 % (ref 0–6.9)
EPI CELLS #/AREA URNS HPF: ABNORMAL /HPF
ERYTHROCYTE [DISTWIDTH] IN BLOOD BY AUTOMATED COUNT: 69.3 FL (ref 35.9–50)
ERYTHROCYTE [DISTWIDTH] IN BLOOD BY AUTOMATED COUNT: 69.7 FL (ref 35.9–50)
EST. AVERAGE GLUCOSE BLD GHB EST-MCNC: 82 MG/DL
FERRITIN SERPL-MCNC: 83.3 NG/ML (ref 10–291)
FERRITIN SERPL-MCNC: 87.4 NG/ML (ref 10–291)
GFR SERPLBLD CREATININE-BSD FMLA CKD-EPI: 42 ML/MIN/1.73 M 2
GLOBULIN SER CALC-MCNC: 2.5 G/DL (ref 1.9–3.5)
GLUCOSE SERPL-MCNC: 98 MG/DL (ref 65–99)
GLUCOSE UR STRIP.AUTO-MCNC: NEGATIVE MG/DL
HBA1C MFR BLD: 4.5 % (ref 4–5.6)
HCT VFR BLD AUTO: 33 % (ref 37–47)
HCT VFR BLD AUTO: 33.5 % (ref 37–47)
HDLC SERPL-MCNC: 42 MG/DL
HGB BLD-MCNC: 9 G/DL (ref 12–16)
HGB BLD-MCNC: 9.2 G/DL (ref 12–16)
HYALINE CASTS #/AREA URNS LPF: ABNORMAL /LPF
IRON SATN MFR SERPL: 8 % (ref 15–55)
IRON SATN MFR SERPL: 9 % (ref 15–55)
IRON SERPL-MCNC: 27 UG/DL (ref 40–170)
IRON SERPL-MCNC: 29 UG/DL (ref 40–170)
KETONES UR STRIP.AUTO-MCNC: ABNORMAL MG/DL
LDLC SERPL CALC-MCNC: 56 MG/DL
LEUKOCYTE ESTERASE UR QL STRIP.AUTO: ABNORMAL
LYMPHOCYTES # BLD AUTO: 0.28 K/UL (ref 1–4.8)
LYMPHOCYTES # BLD AUTO: 1.18 K/UL (ref 1–4.8)
LYMPHOCYTES NFR BLD: 14.8 % (ref 22–41)
LYMPHOCYTES NFR BLD: 3.9 % (ref 22–41)
MAGNESIUM SERPL-MCNC: 1.6 MG/DL (ref 1.5–2.5)
MANUAL DIFF BLD: NORMAL
MANUAL DIFF BLD: NORMAL
MCH RBC QN AUTO: 23.9 PG (ref 27–33)
MCH RBC QN AUTO: 24 PG (ref 27–33)
MCHC RBC AUTO-ENTMCNC: 27.3 G/DL (ref 33.6–35)
MCHC RBC AUTO-ENTMCNC: 27.5 G/DL (ref 33.6–35)
MCV RBC AUTO: 87.2 FL (ref 81.4–97.8)
MCV RBC AUTO: 87.5 FL (ref 81.4–97.8)
MICRO URNS: ABNORMAL
MICROALBUMIN UR-MCNC: 12.2 MG/DL
MICROALBUMIN/CREAT UR: 125 MG/G (ref 0–30)
MONOCYTES # BLD AUTO: 0.46 K/UL (ref 0–0.85)
MONOCYTES # BLD AUTO: 0.55 K/UL (ref 0–0.85)
MONOCYTES NFR BLD AUTO: 6.3 % (ref 0–13.4)
MONOCYTES NFR BLD AUTO: 6.9 % (ref 0–13.4)
MORPHOLOGY BLD-IMP: NORMAL
MORPHOLOGY BLD-IMP: NORMAL
NEUTROPHILS # BLD AUTO: 6.26 K/UL (ref 2–7.15)
NEUTROPHILS # BLD AUTO: 6.56 K/UL (ref 2–7.15)
NEUTROPHILS NFR BLD: 78.3 % (ref 44–72)
NEUTROPHILS NFR BLD: 89.8 % (ref 44–72)
NITRITE UR QL STRIP.AUTO: POSITIVE
NRBC # BLD AUTO: 0 K/UL
NRBC # BLD AUTO: 0 K/UL
NRBC BLD-RTO: 0 /100 WBC
NRBC BLD-RTO: 0 /100 WBC
OVALOCYTES BLD QL SMEAR: NORMAL
OVALOCYTES BLD QL SMEAR: NORMAL
PH UR STRIP.AUTO: 7 [PH] (ref 5–8)
PHOSPHATE SERPL-MCNC: 3.4 MG/DL (ref 2.5–4.5)
PLATELET # BLD AUTO: 167 K/UL (ref 164–446)
PLATELET # BLD AUTO: 168 K/UL (ref 164–446)
PLATELET BLD QL SMEAR: NORMAL
PLATELET BLD QL SMEAR: NORMAL
PMV BLD AUTO: 11.3 FL (ref 9–12.9)
PMV BLD AUTO: 11.4 FL (ref 9–12.9)
POIKILOCYTOSIS BLD QL SMEAR: NORMAL
POIKILOCYTOSIS BLD QL SMEAR: NORMAL
POTASSIUM SERPL-SCNC: 4.4 MMOL/L (ref 3.6–5.5)
PROT SERPL-MCNC: 6.9 G/DL (ref 6–8.2)
PROT UR QL STRIP: 30 MG/DL
PROT UR-MCNC: 34 MG/DL (ref 0–15)
PROT/CREAT UR: 349 MG/G (ref 10–107)
PTH-INTACT SERPL-MCNC: 66.5 PG/ML (ref 14–72)
RBC # BLD AUTO: 3.77 M/UL (ref 4.2–5.4)
RBC # BLD AUTO: 3.84 M/UL (ref 4.2–5.4)
RBC # URNS HPF: ABNORMAL /HPF
RBC BLD AUTO: PRESENT
RBC BLD AUTO: PRESENT
RBC UR QL AUTO: ABNORMAL
SCHISTOCYTES BLD QL SMEAR: NORMAL
SCHISTOCYTES BLD QL SMEAR: NORMAL
SODIUM SERPL-SCNC: 139 MMOL/L (ref 135–145)
SP GR UR STRIP.AUTO: 1.01
TIBC SERPL-MCNC: 324 UG/DL (ref 250–450)
TIBC SERPL-MCNC: 328 UG/DL (ref 250–450)
TRIGL SERPL-MCNC: 69 MG/DL (ref 0–149)
UIBC SERPL-MCNC: 297 UG/DL (ref 110–370)
UIBC SERPL-MCNC: 299 UG/DL (ref 110–370)
URATE SERPL-MCNC: 7.1 MG/DL (ref 1.9–8.2)
UROBILINOGEN UR STRIP.AUTO-MCNC: 1 MG/DL
WBC # BLD AUTO: 7.3 K/UL (ref 4.8–10.8)
WBC # BLD AUTO: 8 K/UL (ref 4.8–10.8)
WBC #/AREA URNS HPF: ABNORMAL /HPF

## 2023-01-20 PROCEDURE — 85025 COMPLETE CBC W/AUTO DIFF WBC: CPT | Mod: 91

## 2023-01-20 PROCEDURE — 83550 IRON BINDING TEST: CPT

## 2023-01-20 PROCEDURE — 87186 SC STD MICRODIL/AGAR DIL: CPT

## 2023-01-20 PROCEDURE — 82728 ASSAY OF FERRITIN: CPT

## 2023-01-20 PROCEDURE — 83970 ASSAY OF PARATHORMONE: CPT

## 2023-01-20 PROCEDURE — 81001 URINALYSIS AUTO W/SCOPE: CPT

## 2023-01-20 PROCEDURE — 82306 VITAMIN D 25 HYDROXY: CPT

## 2023-01-20 PROCEDURE — 85025 COMPLETE CBC W/AUTO DIFF WBC: CPT

## 2023-01-20 PROCEDURE — 87077 CULTURE AEROBIC IDENTIFY: CPT

## 2023-01-20 PROCEDURE — 84100 ASSAY OF PHOSPHORUS: CPT

## 2023-01-20 PROCEDURE — 85007 BL SMEAR W/DIFF WBC COUNT: CPT | Mod: 91

## 2023-01-20 PROCEDURE — 80061 LIPID PANEL: CPT

## 2023-01-20 PROCEDURE — 82728 ASSAY OF FERRITIN: CPT | Mod: 91

## 2023-01-20 PROCEDURE — 83550 IRON BINDING TEST: CPT | Mod: 91

## 2023-01-20 PROCEDURE — 85007 BL SMEAR W/DIFF WBC COUNT: CPT

## 2023-01-20 PROCEDURE — 83540 ASSAY OF IRON: CPT | Mod: 91

## 2023-01-20 PROCEDURE — 82043 UR ALBUMIN QUANTITATIVE: CPT

## 2023-01-20 PROCEDURE — 80053 COMPREHEN METABOLIC PANEL: CPT

## 2023-01-20 PROCEDURE — 83735 ASSAY OF MAGNESIUM: CPT

## 2023-01-20 PROCEDURE — 83540 ASSAY OF IRON: CPT

## 2023-01-20 PROCEDURE — 84156 ASSAY OF PROTEIN URINE: CPT

## 2023-01-20 PROCEDURE — 82570 ASSAY OF URINE CREATININE: CPT

## 2023-01-20 PROCEDURE — 87086 URINE CULTURE/COLONY COUNT: CPT

## 2023-01-20 PROCEDURE — 36415 COLL VENOUS BLD VENIPUNCTURE: CPT

## 2023-01-20 PROCEDURE — 84550 ASSAY OF BLOOD/URIC ACID: CPT

## 2023-01-20 PROCEDURE — 83036 HEMOGLOBIN GLYCOSYLATED A1C: CPT | Mod: GA

## 2023-01-23 RX ORDER — ATORVASTATIN CALCIUM 40 MG/1
40 TABLET, FILM COATED ORAL
Qty: 90 TABLET | Refills: 3 | Status: SHIPPED | OUTPATIENT
Start: 2023-01-23 | End: 2023-10-19 | Stop reason: SDUPTHER

## 2023-01-24 NOTE — TELEPHONE ENCOUNTER
Requested Prescriptions     Signed Prescriptions Disp Refills    atorvastatin (LIPITOR) 40 MG Tab 90 Tablet 3     Sig: TAKE 1 TABLET BY MOUTH AT  BEDTIME     Authorizing Provider: ALLISON SIMPSON A.P.R.N.

## 2023-02-16 ENCOUNTER — TELEMEDICINE (OUTPATIENT)
Dept: CARDIOLOGY | Facility: MEDICAL CENTER | Age: 88
End: 2023-02-16
Payer: MEDICARE

## 2023-02-16 VITALS
RESPIRATION RATE: 16 BRPM | DIASTOLIC BLOOD PRESSURE: 48 MMHG | OXYGEN SATURATION: 100 % | HEIGHT: 60 IN | HEART RATE: 57 BPM | SYSTOLIC BLOOD PRESSURE: 146 MMHG | WEIGHT: 121 LBS | BODY MASS INDEX: 23.75 KG/M2

## 2023-02-16 DIAGNOSIS — N18.32 STAGE 3B CHRONIC KIDNEY DISEASE: ICD-10-CM

## 2023-02-16 DIAGNOSIS — E78.5 DYSLIPIDEMIA: ICD-10-CM

## 2023-02-16 DIAGNOSIS — I10 ESSENTIAL HYPERTENSION: ICD-10-CM

## 2023-02-16 DIAGNOSIS — I48.0 PAROXYSMAL A-FIB (HCC): ICD-10-CM

## 2023-02-16 DIAGNOSIS — R54 FRAILTY: ICD-10-CM

## 2023-02-16 DIAGNOSIS — I25.10 CORONARY ARTERY DISEASE INVOLVING NATIVE CORONARY ARTERY OF NATIVE HEART WITHOUT ANGINA PECTORIS: ICD-10-CM

## 2023-02-16 PROCEDURE — 99214 OFFICE O/P EST MOD 30 MIN: CPT | Mod: 95 | Performed by: INTERNAL MEDICINE

## 2023-02-16 ASSESSMENT — FIBROSIS 4 INDEX: FIB4 SCORE: 3.31

## 2023-02-16 NOTE — PROGRESS NOTES
Cardiology Follow-up Consultation Note    Date of note:    2/16/2023    Primary Care Provider: RAQUEL Vizcaino    Name:             Delphine Reeves   YOB: 1930  MRN:               [id number]    This evaluation was conducted via Zoom, using secure and encrypted videoconferencing technology.  The patient was in cardiology office in the [facility].  The patient's identity was confirmed and verbal consent for the telemedicine encounter was obtained.     Chief Complaint   Patient presents with    Atrial Fibrillation       HISTORY OF PRESENT ILLNESS  Ms. Delphine Reeves is a 89 y.o. female who returns to see us for follow-up of CAD    Pertinent History:  CAD: Presented on 8/6/2022 with STEMI, underwent LHC which showed 100% occluded distal LAD, not amenable to PCI.  Was initiated on medical therapy  Dyslipidemia  HTN  Paroxysmal Afib: Noted when she was admitted with STEMI. Self resolved    Last clinic visit: 10/10/2022 with LUIS CARLOS Grover.  Is new to me.    Interim History:  Since her last visit, she was admitted at [facility] with syncope.  Was found to have acute anemia, did not undergo EGD/colonoscopy and has been on protonix and iron supplements since then.    Feels well. Not limited by chest pain/pressure or dyspnea.     Lives alone. Son and daughter-in-law lives nearby.    Past Medical History:   Diagnosis Date    GERD (gastroesophageal reflux disease)     Hyperparathyroidism (HCC)     Hypertension          Past Surgical History:   Procedure Laterality Date    KNEE REPLACEMENT, TOTAL Left          Current Outpatient Medications   Medication Sig Dispense Refill    atorvastatin (LIPITOR) 40 MG Tab TAKE 1 TABLET BY MOUTH AT  BEDTIME 90 Tablet 3    pantoprazole (PROTONIX) 20 MG tablet Take 1 Tablet by mouth every day. 90 Tablet 1    ferrous sulfate 325 (65 Fe) MG tablet Take 1 Tablet by mouth every Monday, Wednesday, and Friday. 36 Tablet 1    metoprolol SR (TOPROL XL) 25 MG  TABLET SR 24 HR TAKE 1 TABLET BY MOUTH  DAILY (Patient taking differently: 25 mg every evening.) 90 Tablet 3    amLODIPine (NORVASC) 5 MG Tab Take 1 Tablet by mouth every day. 90 Tablet 3    clopidogrel (PLAVIX) 75 MG Tab Take 1 Tablet by mouth every day. 90 Tablet 1     No current facility-administered medications for this visit.         No Known Allergies      Family History   Problem Relation Age of Onset    Heart Disease Mother     Heart Disease Father     Lung Disease Sister     Heart Disease Brother     No Known Problems Brother     Cancer Neg Hx     Diabetes Neg Hx          Social History     Socioeconomic History    Marital status:      Spouse name: Not on file    Number of children: Not on file    Years of education: Not on file    Highest education level: Not on file   Occupational History    Not on file   Tobacco Use    Smoking status: Never    Smokeless tobacco: Never   Vaping Use    Vaping Use: Never used   Substance and Sexual Activity    Alcohol use: No     Comment: holidays    Drug use: No    Sexual activity: Not Currently     Comment:  x 71 yrs, 3 sons, 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    Other Topics Concern    Not on file   Social History Narrative    Not on file     Social Determinants of Health     Financial Resource Strain: Not on file   Food Insecurity: Not on file   Transportation Needs: Not on file   Physical Activity: Not on file   Stress: Not on file   Social Connections: Not on file   Intimate Partner Violence: Not on file   Housing Stability: Not on file         Physical Exam:  Ambulatory Vitals  BP (!) 146/48 (BP Location: Left arm, Patient Position: Sitting, BP Cuff Size: Adult)   Pulse (!) 57   Resp 16   Ht 1.524 m (5')   Wt 54.9 kg (121 lb)   SpO2 100%    Oxygen Therapy:  Pulse Oximetry: 100 %  BP Readings from Last 4 Encounters:   02/16/23 (!) 146/48   11/21/22 136/50   11/16/22 (!) 149/53   10/10/22 (!) 140/36       Weight/BMI: Body mass index is 23.63 kg/m².  Wt Readings from  Last 4 Encounters:   02/16/23 54.9 kg (121 lb)   11/21/22 54.9 kg (121 lb)   11/15/22 55.6 kg (122 lb 9.2 oz)   10/10/22 54.9 kg (121 lb)     General: No acute distress. Well nourished.  HEENT: EOM grossly intact, no scleral icterus, no pharyngeal erythema.   Neck:  Trachea midline, no visible masses  CVS: RRR. No JVD at 90  Resp: Normal respiratory effort. Normal appearing chest.  Abdomen: Not overtly obese.  MSK/Ext: No clubbing or cyanosis.  Skin: Dry appearing, no rashes.  Neurological: CN III-XII grossly intact. No focal deficits.   Psych: A&O x 3, appropriate affect, good judgement      Lab Data Review:  Lab Results   Component Value Date/Time    CHOLSTRLTOT 112 01/20/2023 09:46 AM    LDL 56 01/20/2023 09:46 AM    HDL 42 01/20/2023 09:46 AM    TRIGLYCERIDE 69 01/20/2023 09:46 AM       Lab Results   Component Value Date/Time    SODIUM 139 01/20/2023 09:46 AM    POTASSIUM 4.4 01/20/2023 09:46 AM    CHLORIDE 101 01/20/2023 09:46 AM    CO2 25 01/20/2023 09:46 AM    GLUCOSE 98 01/20/2023 09:46 AM    BUN 21 01/20/2023 09:46 AM    CREATININE 1.20 01/20/2023 09:46 AM     Lab Results   Component Value Date/Time    ALKPHOSPHAT 88 01/20/2023 09:46 AM    ASTSGOT 18 01/20/2023 09:46 AM    ALTSGPT 9 01/20/2023 09:46 AM    TBILIRUBIN 0.6 01/20/2023 09:46 AM      Lab Results   Component Value Date/Time    WBC 8.0 01/20/2023 09:46 AM     Lab Results   Component Value Date/Time    HBA1C 4.5 01/20/2023 09:46 AM    HBA1C 5.4 11/10/2022 12:13 PM       Cardiac Imaging and Procedures Review:    EKG dated 11/15/2022: My personal interpretation is sinus rhythm    Echo dated 8/7/2022:   Compared to the prior echo on 01/05/2016, apical akinesis with small-  moderate pericardial effusion findings are now present.  Atrial fibrillation / flutter is present.  Mild concentric LVH, normal LV systolic function with apical akinesis.   No LV apical thrombus.  Normal right ventricular size and systolic function.  Aortic valve sclerosis without  stenosis. Stage A (at risk for AS) per   doppler assessment.  Small-moderate pericardial effusion without echocardiographic evidence   of hemodynamic compromise.   Normal IVC size.    [facility] (8/6/2022):   IMPRESSION:  1.  Severe one-vessel CAD (LAD) status post unsuccessful attempt at distal LAD revascularization given diffuse nature of severe obstructive CAD throughout the distal LAD with small in caliber distal/apical LAD.  2.  High normal resting LVEDP with no significant transaortic gradient on pullback  3.  Atrial flutter with RVR  4.  Calcified distal right radial and right ulnar arteries      Radiology test Review:  CXR: No acute cardiopulmonary abnormality noted.      Assessment & Plan     1. Coronary artery disease involving native coronary artery of native heart without angina pectoris        2. Essential hypertension        3. Dyslipidemia        4. Paroxysmal A-fib (HCC)        5. Stage 3b chronic kidney disease (HCC)            Has been doing well from a cardiovascular perspective.  Is able to perform her ADLs without angina.  Is on plavix.  Was admitted with acute anemia recently and is now on PPI and iron supplements.  Denies any active bleeding or concerns.    Blood pressure well controlled for age and frailty.  Continue metoprolol-XL 25 mg daily.    LDL within goal.  Continue lipitor 40 mg daily.    I have independently interpreted test results and discussed results and management with the patient.    All of patient's excellent questions were answered to the best of my knowledge and to her satisfaction.  It was a pleasure seeing Ms. Delphine Reeves in my clinic today. Return in about 3 months (around 5/16/2023). Patient is aware to call the cardiology clinic with any questions or concerns.      Hao Mendoza MD  [facility]  [facility], [facility]  [address] [address]  Phone: [phone]  Fax: [phone]    Please note that  this dictation was created using voice recognition software. I have made every reasonable attempt to correct obvious errors, but it is possible there are errors of grammar and possibly content that I did not discover before finalizing the note.

## 2023-02-23 ENCOUNTER — OFFICE VISIT (OUTPATIENT)
Dept: MEDICAL GROUP | Facility: PHYSICIAN GROUP | Age: 88
End: 2023-02-23
Payer: MEDICARE

## 2023-02-23 VITALS
WEIGHT: 113 LBS | HEART RATE: 60 BPM | SYSTOLIC BLOOD PRESSURE: 132 MMHG | OXYGEN SATURATION: 98 % | TEMPERATURE: 97.7 F | DIASTOLIC BLOOD PRESSURE: 50 MMHG | HEIGHT: 60 IN | BODY MASS INDEX: 22.19 KG/M2

## 2023-02-23 DIAGNOSIS — D50.8 IRON DEFICIENCY ANEMIA SECONDARY TO INADEQUATE DIETARY IRON INTAKE: ICD-10-CM

## 2023-02-23 DIAGNOSIS — I10 ESSENTIAL HYPERTENSION, BENIGN: ICD-10-CM

## 2023-02-23 DIAGNOSIS — I21.02 ST ELEVATION MYOCARDIAL INFARCTION INVOLVING LEFT ANTERIOR DESCENDING (LAD) CORONARY ARTERY (HCC): ICD-10-CM

## 2023-02-23 PROCEDURE — 99214 OFFICE O/P EST MOD 30 MIN: CPT | Performed by: NURSE PRACTITIONER

## 2023-02-23 RX ORDER — CLOPIDOGREL BISULFATE 75 MG/1
75 TABLET ORAL DAILY
Qty: 90 TABLET | Refills: 1 | Status: SHIPPED | OUTPATIENT
Start: 2023-02-23 | End: 2023-08-22

## 2023-02-23 ASSESSMENT — FIBROSIS 4 INDEX: FIB4 SCORE: 3.31

## 2023-02-23 ASSESSMENT — PATIENT HEALTH QUESTIONNAIRE - PHQ9: CLINICAL INTERPRETATION OF PHQ2 SCORE: 0

## 2023-02-23 NOTE — ASSESSMENT & PLAN NOTE
Her initial blood pressure today is 146/52.  She continues with amlodipine 5 mg daily and metoprolol SR 25 mg in the evening.  She has had her morning blood pressure medications today.

## 2023-02-23 NOTE — ASSESSMENT & PLAN NOTE
She has followed up with cardiology.  Continues with metoprolol SR 25 mg every evening and Plavix 75 mg daily.  No signs or symptoms of bleeding.  She like a refill on her Plavix today.

## 2023-02-23 NOTE — PROGRESS NOTES
Subjective:     CC: Medication refill Plavix    HPI:   Delphine presents today with the following:    Iron deficiency anemia  She continues with ferrous sulfate 325 mg every Monday, Wednesday and Friday. Her stool has been loose but is now firmer.  Recent labs show improving hemoglobin, hematocrit, iron and ferritin levels.  She continues follow-up with Dr. Hernandez, nephrology, with appointment 2/28/2023    Essential hypertension, benign  Her initial blood pressure today is 146/52.  She continues with amlodipine 5 mg daily and metoprolol SR 25 mg in the evening.  She has had her morning blood pressure medications today.        STEMI (ST elevation myocardial infarction) ([facility])  She has followed up with cardiology.  Continues with metoprolol SR 25 mg every evening and Plavix 75 mg daily.  No signs or symptoms of bleeding.  She like a refill on her Plavix today.          Past Medical History:   Diagnosis Date    GERD (gastroesophageal reflux disease)     Hyperparathyroidism ([facility])     Hypertension        Social History     Tobacco Use    Smoking status: Never    Smokeless tobacco: Never   Vaping Use    Vaping Use: Never used   Substance Use Topics    Alcohol use: No     Comment: holidays    Drug use: No       Current Outpatient Medications Ordered in Epic   Medication Sig Dispense Refill    clopidogrel (PLAVIX) 75 MG Tab Take 1 Tablet by mouth every day. 90 Tablet 1    atorvastatin (LIPITOR) 40 MG Tab TAKE 1 TABLET BY MOUTH AT  BEDTIME 90 Tablet 3    pantoprazole (PROTONIX) 20 MG tablet Take 1 Tablet by mouth every day. 90 Tablet 1    ferrous sulfate 325 (65 Fe) MG tablet Take 1 Tablet by mouth every Monday, Wednesday, and Friday. 36 Tablet 1    metoprolol SR (TOPROL XL) 25 MG TABLET SR 24 HR TAKE 1 TABLET BY MOUTH  DAILY (Patient taking differently: 25 mg every evening.) 90 Tablet 3    amLODIPine (NORVASC) 5 MG Tab Take 1 Tablet by mouth every day. 90 Tablet 3     No current Epic-ordered facility-administered medications  on file.       Allergies:  Patient has no known allergies.    Health Maintenance: Reviewed.      Objective:     Vital signs reviewed  Exam:  /50 (BP Location: Right arm, Patient Position: Sitting)   Pulse 60   Temp 36.5 °C (97.7 °F) (Temporal)   Ht 1.524 m (5')   Wt 51.3 kg (113 lb)   SpO2 98%   BMI 22.07 kg/m²  Body mass index is 22.07 kg/m².    Gen: Alert and oriented, No apparent distress.  Lungs: Normal effort, CTA bilaterally, no wheezes, rhonchi, or rales  CV: Regular rate and rhythm with systolic murmur.  No rubs or gallops.      Assessment & Plan:     89 y.o. female with the following -     1. Iron deficiency anemia secondary to inadequate dietary iron intake  Chronic exacerbated problem.  Anemia and iron deficiency still low but are improving.  Continue ferrous sulfate 325 mg every Monday, Wednesday and Friday.    2. Essential hypertension, benign  Chronic stable problem.  On repeat by me her blood pressure is 132/50.  She will continue with amlodipine 5 mg daily and metoprolol SR 25 mg daily.    3. ST elevation myocardial infarction involving left anterior descending (LAD) coronary artery (HCC)  Chronic stable problem.  Continue follow-up with cardiology.  Continue with Plavix 75 mg daily.  - clopidogrel (PLAVIX) 75 MG Tab; Take 1 Tablet by mouth every day.  Dispense: 90 Tablet; Refill: 1      Return in about 14 weeks (around 6/1/2023) for annual wellness visit .    Please note that this dictation was created using voice recognition software. I have made every reasonable attempt to correct obvious errors, but I expect that there are errors of grammar and possibly content that I did not discover before finalizing the note.

## 2023-02-23 NOTE — ASSESSMENT & PLAN NOTE
She continues with ferrous sulfate 325 mg every Monday, Wednesday and Friday. Her stool has been loose but is now firmer.  Recent labs show improving hemoglobin, hematocrit, iron and ferritin levels.  She continues follow-up with Dr. Hernandez, nephrology, with appointment 2/28/2023

## 2023-04-24 ENCOUNTER — HOSPITAL ENCOUNTER (OUTPATIENT)
Dept: LAB | Facility: MEDICAL CENTER | Age: 88
End: 2023-04-24
Attending: INTERNAL MEDICINE
Payer: MEDICARE

## 2023-04-24 LAB
CREAT UR-MCNC: 40.76 MG/DL
FERRITIN SERPL-MCNC: 34.9 NG/ML (ref 10–291)
MICROALBUMIN UR-MCNC: 2.6 MG/DL
MICROALBUMIN/CREAT UR: 64 MG/G (ref 0–30)

## 2023-04-24 PROCEDURE — 36415 COLL VENOUS BLD VENIPUNCTURE: CPT

## 2023-04-24 PROCEDURE — 82043 UR ALBUMIN QUANTITATIVE: CPT

## 2023-04-24 PROCEDURE — 82570 ASSAY OF URINE CREATININE: CPT

## 2023-04-24 PROCEDURE — 82728 ASSAY OF FERRITIN: CPT

## 2023-05-05 DIAGNOSIS — K21.9 GASTROESOPHAGEAL REFLUX DISEASE WITHOUT ESOPHAGITIS: ICD-10-CM

## 2023-05-08 RX ORDER — PANTOPRAZOLE SODIUM 20 MG/1
TABLET, DELAYED RELEASE ORAL
Qty: 90 TABLET | Refills: 3 | Status: SHIPPED | OUTPATIENT
Start: 2023-05-08

## 2023-05-08 NOTE — TELEPHONE ENCOUNTER
Requested Prescriptions     Signed Prescriptions Disp Refills    pantoprazole (PROTONIX) 20 MG tablet 90 Tablet 3     Sig: TAKE 1 TABLET BY MOUTH DAILY     Authorizing Provider: ALLISON SIMPSON A.P.R.N.

## 2023-05-15 ENCOUNTER — HOSPITAL ENCOUNTER (OUTPATIENT)
Dept: LAB | Facility: MEDICAL CENTER | Age: 88
End: 2023-05-15
Attending: INTERNAL MEDICINE
Payer: MEDICARE

## 2023-05-15 LAB
ALBUMIN SERPL BCP-MCNC: 4.1 G/DL (ref 3.2–4.9)
ANISOCYTOSIS BLD QL SMEAR: ABNORMAL
BASOPHILS # BLD AUTO: 0.2 % (ref 0–1.8)
BASOPHILS # BLD: 0.01 K/UL (ref 0–0.12)
BUN SERPL-MCNC: 35 MG/DL (ref 8–22)
BURR CELLS BLD QL SMEAR: NORMAL
CALCIUM ALBUM COR SERPL-MCNC: 8.3 MG/DL (ref 8.5–10.5)
CALCIUM SERPL-MCNC: 8.4 MG/DL (ref 8.5–10.5)
CHLORIDE SERPL-SCNC: 104 MMOL/L (ref 96–112)
CO2 SERPL-SCNC: 21 MMOL/L (ref 20–33)
COMMENT 1642: NORMAL
CREAT SERPL-MCNC: 1.39 MG/DL (ref 0.5–1.4)
EOSINOPHIL # BLD AUTO: 0 K/UL (ref 0–0.51)
EOSINOPHIL NFR BLD: 0 % (ref 0–6.9)
ERYTHROCYTE [DISTWIDTH] IN BLOOD BY AUTOMATED COUNT: 50.5 FL (ref 35.9–50)
FASTING STATUS PATIENT QL REPORTED: NORMAL
FERRITIN SERPL-MCNC: 45.9 NG/ML (ref 10–291)
GFR SERPLBLD CREATININE-BSD FMLA CKD-EPI: 35 ML/MIN/1.73 M 2
GLUCOSE SERPL-MCNC: 102 MG/DL (ref 65–99)
HCT VFR BLD AUTO: 35.6 % (ref 37–47)
HGB BLD-MCNC: 10.6 G/DL (ref 12–16)
IMM GRANULOCYTES # BLD AUTO: 0.03 K/UL (ref 0–0.11)
IMM GRANULOCYTES NFR BLD AUTO: 0.6 % (ref 0–0.9)
IRON SATN MFR SERPL: 13 % (ref 15–55)
IRON SERPL-MCNC: 39 UG/DL (ref 40–170)
LYMPHOCYTES # BLD AUTO: 1.46 K/UL (ref 1–4.8)
LYMPHOCYTES NFR BLD: 28.1 % (ref 22–41)
MCH RBC QN AUTO: 27.9 PG (ref 27–33)
MCHC RBC AUTO-ENTMCNC: 29.8 G/DL (ref 33.6–35)
MCV RBC AUTO: 93.7 FL (ref 81.4–97.8)
MICROCYTES BLD QL SMEAR: ABNORMAL
MONOCYTES # BLD AUTO: 0.74 K/UL (ref 0–0.85)
MONOCYTES NFR BLD AUTO: 14.3 % (ref 0–13.4)
MORPHOLOGY BLD-IMP: NORMAL
NEUTROPHILS # BLD AUTO: 2.95 K/UL (ref 2–7.15)
NEUTROPHILS NFR BLD: 56.8 % (ref 44–72)
NRBC # BLD AUTO: 0 K/UL
NRBC BLD-RTO: 0 /100 WBC
OVALOCYTES BLD QL SMEAR: NORMAL
PHOSPHATE SERPL-MCNC: 3.5 MG/DL (ref 2.5–4.5)
PLATELET # BLD AUTO: 195 K/UL (ref 164–446)
PLATELET BLD QL SMEAR: NORMAL
PMV BLD AUTO: 11.7 FL (ref 9–12.9)
POIKILOCYTOSIS BLD QL SMEAR: NORMAL
POTASSIUM SERPL-SCNC: 4.8 MMOL/L (ref 3.6–5.5)
RBC # BLD AUTO: 3.8 M/UL (ref 4.2–5.4)
RBC BLD AUTO: PRESENT
SCHISTOCYTES BLD QL SMEAR: NORMAL
SODIUM SERPL-SCNC: 140 MMOL/L (ref 135–145)
TIBC SERPL-MCNC: 310 UG/DL (ref 250–450)
UIBC SERPL-MCNC: 271 UG/DL (ref 110–370)
WBC # BLD AUTO: 5.2 K/UL (ref 4.8–10.8)

## 2023-05-15 PROCEDURE — 85025 COMPLETE CBC W/AUTO DIFF WBC: CPT

## 2023-05-15 PROCEDURE — 36415 COLL VENOUS BLD VENIPUNCTURE: CPT

## 2023-05-15 PROCEDURE — 83540 ASSAY OF IRON: CPT

## 2023-05-15 PROCEDURE — 83550 IRON BINDING TEST: CPT

## 2023-05-15 PROCEDURE — 82728 ASSAY OF FERRITIN: CPT

## 2023-05-15 PROCEDURE — 80069 RENAL FUNCTION PANEL: CPT

## 2023-05-18 ENCOUNTER — OFFICE VISIT (OUTPATIENT)
Dept: CARDIOLOGY | Facility: MEDICAL CENTER | Age: 88
End: 2023-05-18
Attending: INTERNAL MEDICINE
Payer: MEDICARE

## 2023-05-18 VITALS
RESPIRATION RATE: 14 BRPM | BODY MASS INDEX: 23.56 KG/M2 | DIASTOLIC BLOOD PRESSURE: 68 MMHG | WEIGHT: 120 LBS | SYSTOLIC BLOOD PRESSURE: 138 MMHG | OXYGEN SATURATION: 100 % | HEART RATE: 57 BPM | HEIGHT: 60 IN

## 2023-05-18 DIAGNOSIS — E78.5 DYSLIPIDEMIA: ICD-10-CM

## 2023-05-18 DIAGNOSIS — I10 ESSENTIAL HYPERTENSION: ICD-10-CM

## 2023-05-18 DIAGNOSIS — I25.10 CORONARY ARTERY DISEASE INVOLVING NATIVE CORONARY ARTERY OF NATIVE HEART WITHOUT ANGINA PECTORIS: ICD-10-CM

## 2023-05-18 DIAGNOSIS — I48.0 PAROXYSMAL A-FIB (HCC): ICD-10-CM

## 2023-05-18 LAB — EKG IMPRESSION: NORMAL

## 2023-05-18 PROCEDURE — 99212 OFFICE O/P EST SF 10 MIN: CPT | Performed by: INTERNAL MEDICINE

## 2023-05-18 PROCEDURE — 3075F SYST BP GE 130 - 139MM HG: CPT | Performed by: INTERNAL MEDICINE

## 2023-05-18 PROCEDURE — 99214 OFFICE O/P EST MOD 30 MIN: CPT | Performed by: INTERNAL MEDICINE

## 2023-05-18 PROCEDURE — 93010 ELECTROCARDIOGRAM REPORT: CPT | Performed by: INTERNAL MEDICINE

## 2023-05-18 PROCEDURE — 3078F DIAST BP <80 MM HG: CPT | Performed by: INTERNAL MEDICINE

## 2023-05-18 PROCEDURE — 93005 ELECTROCARDIOGRAM TRACING: CPT | Performed by: INTERNAL MEDICINE

## 2023-05-18 RX ORDER — CINACALCET 60 MG/1
1 TABLET, FILM COATED ORAL
COMMUNITY
Start: 2023-04-11

## 2023-05-18 ASSESSMENT — FIBROSIS 4 INDEX: FIB4 SCORE: 2.86

## 2023-05-18 NOTE — PROGRESS NOTES
Cardiology Follow-up Consultation Note    Date of note:    5/18/2023    Primary Care Provider: RAQUEL Vizcaino    Name:             Delphine Reeves   YOB: 1930  MRN:               [id number]      Chief Complaint   Patient presents with    Atrial Fibrillation     F/V Dx: Paroxysmal A-fib (HCC)       HISTORY OF PRESENT ILLNESS  Ms. Delphine Reeves is a 89 y.o. female who returns to see us for follow-up of CAD    Pertinent History:  CAD: Presented on 8/6/2022 with STEMI, underwent LHC which showed 100% occluded distal LAD, not amenable to PCI.  Was initiated on medical therapy  Dyslipidemia  HTN  Paroxysmal Afib: Noted when she was admitted with STEMI. Self resolved  CKD stage IIIb  Iron deficiency anemia, diagnosed when she was admitted at [facility] with syncope.    Last clinic visit: 2/16/2023    Interim History:  Since her last visit, has been doing well from a cardiovascular perspective.  No concerning cardiovascular symptoms which is reassuring.  Remains independent and is able to perform her ADLs without limiting factors.    Lives alone. Son and daughter-in-law lives nearby.    Past Medical History:   Diagnosis Date    GERD (gastroesophageal reflux disease)     Hyperparathyroidism (HCC)     Hypertension          Past Surgical History:   Procedure Laterality Date    KNEE REPLACEMENT, TOTAL Left          Current Outpatient Medications   Medication Sig Dispense Refill    Cinacalcet HCl 60 MG Tab Take 1 Tablet by mouth every day.      mupirocin (BACTROBAN) 2 % Ointment APPLY 1 APPLICATION ON THE SKIN TWICE A DAY      pantoprazole (PROTONIX) 20 MG tablet TAKE 1 TABLET BY MOUTH DAILY 90 Tablet 3    clopidogrel (PLAVIX) 75 MG Tab Take 1 Tablet by mouth every day. 90 Tablet 1    atorvastatin (LIPITOR) 40 MG Tab TAKE 1 TABLET BY MOUTH AT  BEDTIME 90 Tablet 3    ferrous sulfate 325 (65 Fe) MG tablet Take 1 Tablet by mouth every Monday, Wednesday, and Friday. 36 Tablet 1    metoprolol SR  (TOPROL XL) 25 MG TABLET SR 24 HR TAKE 1 TABLET BY MOUTH  DAILY (Patient taking differently: 25 mg every evening.) 90 Tablet 3    amLODIPine (NORVASC) 5 MG Tab Take 1 Tablet by mouth every day. 90 Tablet 3     No current facility-administered medications for this visit.         No Known Allergies      Family History   Problem Relation Age of Onset    Heart Disease Mother     Heart Disease Father     Lung Disease Sister     Heart Disease Brother     No Known Problems Brother     Cancer Neg Hx     Diabetes Neg Hx          Social History     Socioeconomic History    Marital status:      Spouse name: Not on file    Number of children: Not on file    Years of education: Not on file    Highest education level: Not on file   Occupational History    Not on file   Tobacco Use    Smoking status: Never    Smokeless tobacco: Never   Vaping Use    Vaping Use: Never used   Substance and Sexual Activity    Alcohol use: No     Comment: holidays    Drug use: No    Sexual activity: Not Currently     Comment:  x 71 yrs, 3 sons, wcsd    Other Topics Concern    Not on file   Social History Narrative    Not on file     Social Determinants of Health     Financial Resource Strain: Not on file   Food Insecurity: Not on file   Transportation Needs: Not on file   Physical Activity: Not on file   Stress: Not on file   Social Connections: Not on file   Intimate Partner Violence: Not on file   Housing Stability: Not on file         Physical Exam:  Ambulatory Vitals  /68 (BP Location: Left arm, Patient Position: Sitting, BP Cuff Size: Adult)   Pulse (!) 57   Resp 14   Ht 1.524 m (5')   Wt 54.4 kg (120 lb)   SpO2 100%    Oxygen Therapy:  Pulse Oximetry: 100 %  BP Readings from Last 4 Encounters:   05/18/23 138/68   02/23/23 132/50   02/16/23 (!) 146/48   11/21/22 136/50       Weight/BMI: Body mass index is 23.44 kg/m².  Wt Readings from Last 4 Encounters:   05/18/23 54.4 kg (120 lb)   02/23/23 51.3 kg (113 lb)    02/16/23 54.9 kg (121 lb)   11/21/22 54.9 kg (121 lb)     General: No acute distress. Well nourished.  HEENT: EOM grossly intact, no scleral icterus, no pharyngeal erythema.   Neck:  Trachea midline, no visible masses  CVS: RRR.  Systolic murmur heard throughout the precordium, grade 3/6  Resp: Normal respiratory effort.  No wheezing, crackles  Abdomen: Soft, NT, ND  MSK/Ext: No clubbing or cyanosis.  No lower extremity edema noted  Skin: Dry appearing, no rashes.  Neurological: CN III-XII grossly intact. No focal deficits.   Psych: A&O x 3, appropriate affect, good judgement      Lab Data Review:  Lab Results   Component Value Date/Time    CHOLSTRLTOT 112 01/20/2023 09:46 AM    LDL 56 01/20/2023 09:46 AM    HDL 42 01/20/2023 09:46 AM    TRIGLYCERIDE 69 01/20/2023 09:46 AM       Lab Results   Component Value Date/Time    SODIUM 140 05/15/2023 08:29 AM    POTASSIUM 4.8 05/15/2023 08:29 AM    CHLORIDE 104 05/15/2023 08:29 AM    CO2 21 05/15/2023 08:29 AM    GLUCOSE 102 (H) 05/15/2023 08:29 AM    BUN 35 (H) 05/15/2023 08:29 AM    CREATININE 1.39 05/15/2023 08:29 AM     Lab Results   Component Value Date/Time    ALKPHOSPHAT 88 01/20/2023 09:46 AM    ASTSGOT 18 01/20/2023 09:46 AM    ALTSGPT 9 01/20/2023 09:46 AM    TBILIRUBIN 0.6 01/20/2023 09:46 AM      Lab Results   Component Value Date/Time    WBC 5.2 05/15/2023 08:29 AM     Lab Results   Component Value Date/Time    HBA1C 4.5 01/20/2023 09:46 AM    HBA1C 5.4 11/10/2022 12:13 PM       Cardiac Imaging and Procedures Review:    EKG dated 5/18/2023: My personal interpretation is sinus rhythm, diffuse T wave inversions    EKG dated 11/15/2022: My personal interpretation is sinus rhythm    Echo dated 8/7/2022:   Compared to the prior echo on 01/05/2016, apical akinesis with small-  moderate pericardial effusion findings are now present.  Atrial fibrillation / flutter is present.  Mild concentric LVH, normal LV systolic function with apical akinesis.   No LV apical  thrombus.  Normal right ventricular size and systolic function.  Aortic valve sclerosis without stenosis. Stage A (at risk for AS) per   doppler assessment.  Small-moderate pericardial effusion without echocardiographic evidence   of hemodynamic compromise.   Normal IVC size.    [facility] (8/6/2022):   IMPRESSION:  1.  Severe one-vessel CAD (LAD) status post unsuccessful attempt at distal LAD revascularization given diffuse nature of severe obstructive CAD throughout the distal LAD with small in caliber distal/apical LAD.  2.  High normal resting LVEDP with no significant transaortic gradient on pullback  3.  Atrial flutter with RVR  4.  Calcified distal right radial and right ulnar arteries      Radiology test Review:  CXR: No acute cardiopulmonary abnormality noted.      Assessment & Plan     1. Paroxysmal A-fib (HCC)  EKG      2. Coronary artery disease involving native coronary artery of native heart without angina pectoris        3. Dyslipidemia        4. Essential hypertension            Patient is doing well from a cardiovascular perspective.  Denies any anginal symptoms which is reassuring.  Continue Plavix 75 mg daily.  Labs reviewed which shows an improvement in hemoglobin.  However, continues to remain iron deficient.  We discussed taking iron supplements daily rather than 3 times a week with vitamin C for better absorption.  Written instructions provided to the patient.    Blood pressure well controlled.  Continue with Toprol-XL 25 mg daily.    For dyslipidemia and CAD, LDL within goal.  Continue Lipitor 40 mg daily.      I have independently interpreted test results and discussed results and management with the patient.    All of patient's excellent questions were answered to the best of my knowledge and to her satisfaction.  It was a pleasure seeing Ms. Delphine Reeves in my clinic today. Return in about 1 year (around 5/18/2024). Patient is aware to call the cardiology clinic with any questions or  concerns.      Hao Mendoza MD  [facility]  [facility], [address]  [address], [place] [address]  [place] [address]  Phone: [phone]  Fax: [phone]    Please note that this dictation was created using voice recognition software. I have made every reasonable attempt to correct obvious errors, but it is possible there are errors of grammar and possibly content that I did not discover before finalizing the note.

## 2023-06-27 ENCOUNTER — HOSPITAL ENCOUNTER (OUTPATIENT)
Dept: LAB | Facility: MEDICAL CENTER | Age: 88
End: 2023-06-27
Attending: NURSE PRACTITIONER
Payer: MEDICARE

## 2023-06-27 ENCOUNTER — OFFICE VISIT (OUTPATIENT)
Dept: MEDICAL GROUP | Facility: PHYSICIAN GROUP | Age: 88
End: 2023-06-27
Payer: MEDICARE

## 2023-06-27 VITALS
BODY MASS INDEX: 23.75 KG/M2 | WEIGHT: 121 LBS | TEMPERATURE: 98.1 F | SYSTOLIC BLOOD PRESSURE: 132 MMHG | HEART RATE: 60 BPM | HEIGHT: 60 IN | DIASTOLIC BLOOD PRESSURE: 58 MMHG | OXYGEN SATURATION: 98 %

## 2023-06-27 DIAGNOSIS — E78.00 PURE HYPERCHOLESTEROLEMIA: ICD-10-CM

## 2023-06-27 DIAGNOSIS — I10 ESSENTIAL HYPERTENSION, BENIGN: ICD-10-CM

## 2023-06-27 DIAGNOSIS — Z00.00 MEDICARE ANNUAL WELLNESS VISIT, INITIAL: Primary | ICD-10-CM

## 2023-06-27 DIAGNOSIS — I48.0 PAROXYSMAL A-FIB (HCC): ICD-10-CM

## 2023-06-27 DIAGNOSIS — N18.32 STAGE 3B CHRONIC KIDNEY DISEASE: ICD-10-CM

## 2023-06-27 DIAGNOSIS — M10.9 ACUTE GOUT INVOLVING TOE OF RIGHT FOOT, UNSPECIFIED CAUSE: ICD-10-CM

## 2023-06-27 DIAGNOSIS — K21.9 GASTROESOPHAGEAL REFLUX DISEASE WITHOUT ESOPHAGITIS: ICD-10-CM

## 2023-06-27 DIAGNOSIS — D50.8 OTHER IRON DEFICIENCY ANEMIA: ICD-10-CM

## 2023-06-27 PROBLEM — D69.6 THROMBOCYTOPENIA (HCC): Status: RESOLVED | Noted: 2020-10-20 | Resolved: 2023-06-27

## 2023-06-27 PROBLEM — Z09 HOSPITAL DISCHARGE FOLLOW-UP: Status: RESOLVED | Noted: 2022-09-01 | Resolved: 2023-06-27

## 2023-06-27 PROBLEM — R55 SYNCOPE: Status: RESOLVED | Noted: 2022-08-06 | Resolved: 2023-06-27

## 2023-06-27 PROBLEM — D70.8 OTHER NEUTROPENIA (HCC): Status: RESOLVED | Noted: 2020-06-10 | Resolved: 2023-06-27

## 2023-06-27 PROBLEM — D62 ACUTE BLOOD LOSS ANEMIA: Status: RESOLVED | Noted: 2022-11-15 | Resolved: 2023-06-27

## 2023-06-27 LAB — URATE SERPL-MCNC: 5.8 MG/DL (ref 1.9–8.2)

## 2023-06-27 PROCEDURE — 36415 COLL VENOUS BLD VENIPUNCTURE: CPT

## 2023-06-27 PROCEDURE — 3075F SYST BP GE 130 - 139MM HG: CPT | Performed by: NURSE PRACTITIONER

## 2023-06-27 PROCEDURE — 3078F DIAST BP <80 MM HG: CPT | Performed by: NURSE PRACTITIONER

## 2023-06-27 PROCEDURE — G0439 PPPS, SUBSEQ VISIT: HCPCS | Performed by: NURSE PRACTITIONER

## 2023-06-27 PROCEDURE — 85652 RBC SED RATE AUTOMATED: CPT

## 2023-06-27 PROCEDURE — 84550 ASSAY OF BLOOD/URIC ACID: CPT

## 2023-06-27 RX ORDER — FERROUS SULFATE 325(65) MG
325 TABLET ORAL DAILY
Qty: 90 TABLET | Refills: 1 | Status: SHIPPED | OUTPATIENT
Start: 2023-06-27 | End: 2023-10-19 | Stop reason: SDUPTHER

## 2023-06-27 RX ORDER — PREDNISONE 20 MG/1
20 TABLET ORAL DAILY
Qty: 5 TABLET | Refills: 0 | Status: SHIPPED | OUTPATIENT
Start: 2023-06-27 | End: 2023-07-02

## 2023-06-27 ASSESSMENT — FIBROSIS 4 INDEX: FIB4 SCORE: 2.86

## 2023-06-27 ASSESSMENT — ACTIVITIES OF DAILY LIVING (ADL): BATHING_REQUIRES_ASSISTANCE: 0

## 2023-06-27 ASSESSMENT — PATIENT HEALTH QUESTIONNAIRE - PHQ9: CLINICAL INTERPRETATION OF PHQ2 SCORE: 0

## 2023-06-27 ASSESSMENT — ENCOUNTER SYMPTOMS: GENERAL WELL-BEING: GOOD

## 2023-06-27 NOTE — ASSESSMENT & PLAN NOTE
At her last cardiology appointment she was recommended to increase her ferrous sulfate to daily dosing and take with vitamin C.  For the last month she taking daily ferrous sulfate and drinking orange juice. She is buying the OTC ferrous sulfate.

## 2023-06-27 NOTE — ASSESSMENT & PLAN NOTE
Blood pressure today 132/58.  Continues metoprolol SR 25 mg every evening and amlodipine 5 mg daily.

## 2023-06-27 NOTE — ASSESSMENT & PLAN NOTE
Last night noticed pain and swelling to right great toe. It is painful and warm to the touch.  She has not noticed any fevers or chills.  Denies any falls or trauma to the area.  She had a hamburger last night to eat.

## 2023-06-27 NOTE — ASSESSMENT & PLAN NOTE
Recent follow-up with cardiology.  Continues with metoprolol SR 25 mg daily and clopidogrel 75 mg daily.

## 2023-06-27 NOTE — PROGRESS NOTES
Chief Complaint   Patient presents with    Annual Exam     Wellness        HPI:  Delphine Reeves is a 89 y.o. here for Medicare Annual Wellness Visit       Iron deficiency anemia  At her last cardiology appointment she was recommended to increase her ferrous sulfate to daily dosing and take with vitamin C.  For the last month she taking daily ferrous sulfate and drinking orange juice. She is buying the OTC ferrous sulfate.    Essential hypertension, benign  Blood pressure today 132/58.  Continues metoprolol SR 25 mg every evening and amlodipine 5 mg daily.    Gastroesophageal reflux disease without esophagitis  Continues pantoprazole 20 mg daily.    Pure hypercholesterolemia  Cholesterol controlled with atorvastatin 40 mg at bedtime.    Stage 3b chronic kidney disease (HCC)  Followed by nephrology, Dr. Hernandez, every 3 months.  Avoids NSAIDs.  Recent GFR of 35.    Paroxysmal A-fib (HCC)  Recent follow-up with cardiology.  Continues with metoprolol SR 25 mg daily and clopidogrel 75 mg daily.    Acute gout involving toe of right foot  Last night noticed pain and swelling to right great toe. It is painful and warm to the touch.  She has not noticed any fevers or chills.  Denies any falls or trauma to the area.  She had a hamburger last night to eat.        Patient Active Problem List    Diagnosis Date Noted    Acute gout involving toe of right foot 06/27/2023    Iron deficiency anemia 09/08/2022    STEMI (ST elevation myocardial infarction) ([facility]) 08/06/2022    Paroxysmal A-fib ([facility]) 08/06/2022    Other specified anemias 08/06/2022    Blister 07/25/2022    Difficulty sleeping 04/28/2021    Low serum vitamin D 11/14/2017    Stage 3b chronic kidney disease (HCC) 09/22/2017    Hyperparathyroidism ([facility]) 03/24/2017    Abnormal chest CT 02/21/2017    Pure hypercholesterolemia 09/12/2016    Essential hypertension, benign 02/10/2016    Gastroesophageal reflux disease without esophagitis 02/10/2016       Current Outpatient  Medications   Medication Sig Dispense Refill    ferrous sulfate 325 (65 Fe) MG tablet Take 1 Tablet by mouth every day. 90 Tablet 1    predniSONE (DELTASONE) 20 MG Tab Take 1 Tablet by mouth every day for 5 days. 5 Tablet 0    Cinacalcet HCl 60 MG Tab Take 1 Tablet by mouth every day.      pantoprazole (PROTONIX) 20 MG tablet TAKE 1 TABLET BY MOUTH DAILY 90 Tablet 3    clopidogrel (PLAVIX) 75 MG Tab Take 1 Tablet by mouth every day. 90 Tablet 1    atorvastatin (LIPITOR) 40 MG Tab TAKE 1 TABLET BY MOUTH AT  BEDTIME 90 Tablet 3    metoprolol SR (TOPROL XL) 25 MG TABLET SR 24 HR TAKE 1 TABLET BY MOUTH  DAILY (Patient taking differently: 25 mg every evening.) 90 Tablet 3    amLODIPine (NORVASC) 5 MG Tab Take 1 Tablet by mouth every day. 90 Tablet 3    mupirocin (BACTROBAN) 2 % Ointment APPLY 1 APPLICATION ON THE SKIN TWICE A DAY (Patient not taking: Reported on 6/27/2023)       No current facility-administered medications for this visit.          Current supplements as per medication list.     Allergies: Patient has no known allergies.    Current social contact/activities: visits with family and neighbors      She  reports that she has never smoked. She has never used smokeless tobacco. She reports that she does not drink alcohol and does not use drugs.  Counseling given: Yes      ROS:    Gait: Uses a cane  Ostomy: No  Other tubes: No  Amputations: No  Chronic oxygen use: No  Last eye exam: 2 years ago, encouraged to schedule  Wears hearing aids: No   : Denies any urinary leakage during the last 6 months    Screening:  Discussed and reviewed   Depression Screening  Little interest or pleasure in doing things?  0 - not at all  Feeling down, depressed , or hopeless? 0 - not at all  Patient Health Questionnaire Score: 0     If depressive symptoms identified deferred to follow up visit unless specifically addressed in assessment and plan.    Interpretation of PHQ-9 Total Score   Score Severity   1-4 No Depression   5-9  Mild Depression   10-14 Moderate Depression   15-19 Moderately Severe Depression   20-27 Severe Depression    Screening for Cognitive Impairment  Three Minute Recall (daughter, heaven, mountain) 1/3    Ricco clock face with all 12 numbers and set the hands to show 10 past 11.  Yes    Cognitive concerns identified deferred for follow up unless specifically addressed in assessment and plan.    Discussed and reviewed.  Not interested in referral at this time.     Fall Risk Assessment  Has the patient had two or more falls in the last year or any fall with injury in the last year?  No    Safety Assessment  Throw rugs on floor.  No  Handrails on all stairs.  No  Good lighting in all hallways.  Yes  Difficulty hearing.  No  Patient counseled about all safety risks that were identified.    Functional Assessment ADLs  Are there any barriers preventing you from cooking for yourself or meeting nutritional needs?  No. Has meals on wheels every Wednesday for 7 day supply.   Are there any barriers preventing you from driving safely or obtaining transportation?  No. Son/friends drives her   Are there any barriers preventing you from using a telephone or calling for help?  No.    Are there any barriers preventing you from shopping?  No.    Are there any barriers preventing you from taking care of your own finances?  No.    Are there any barriers preventing you from managing your medications?  No.    Are there any barriers preventing you from showering, bathing or dressing yourself?  No.    Are you currently engaging in any exercise or physical activity?  Yes.  Goes for walks in her yard   What is your perception of your health?  Good    She is discussing with sons about [employer] as she does live alone.     Advance Care Planning  Do you have an Advance Directive, Living Will, Durable Power of , or POLST? Yes  Advance Directive       is on file      Health Maintenance Summary            Overdue - IMM ZOSTER VACCINES (3 of  3) Overdue since 7/30/2021 06/04/2021  Imm Admin: Zoster Vaccine Recombinant (RZV) (SHINGRIX)    02/10/2015  Imm Admin: Zoster Vaccine Live (ZVL) (Zostavax) - HISTORICAL DATA              Overdue - COVID-19 Vaccine (4 - Pfizer series) Overdue since 12/10/2021      10/15/2021  Imm Admin: PFIZER PURPLE CAP SARS-COV-2 VACCINATION (12+)    03/04/2021  Imm Admin: PFIZER PURPLE CAP SARS-COV-2 VACCINATION (12+)    02/11/2021  Imm Admin: PFIZER PURPLE CAP SARS-COV-2 VACCINATION (12+)              Annual Wellness Visit (Every 366 Days) Next due on 6/27/2024 06/27/2023  Level of Service: INITIAL AN[facility]AL WELLNESS VISIT[facility]    06/27/2023  Visit Dx: Medicare annual wellness visit, initial              IMM DTaP/Tdap/Td Vaccine (2 - Td or Tdap) Next due on 8/19/2029 08/19/2019  Imm Admin: Tdap Vaccine              IMM PNEUMOCOCCAL VACCINE: 65+ Years (Series Information) Completed      09/22/2017  Imm Admin: Pneumococcal polysaccharide vaccine (PPSV-23)    10/16/2016  Imm Admin: Pneumococcal Conjugate Vaccine (Prevnar/PCV-13)    11/05/2015  Imm Admin: Pneumococcal Conjugate Vaccine (Prevnar/PCV-13)              IMM INFLUENZA (Series Information) Completed      10/03/2022  Imm Admin: Influenza Vaccine Adult HD    09/29/2021  Imm Admin: Influenza Vaccine Adult HD    10/20/2020  Imm Admin: Influenza Vaccine Adult HD    11/09/2019  Imm Admin: Influenza Vaccine Adult HD    10/02/2017  Imm Admin: Influenza Vaccine Adult HD    Only the first 5 history entries have been loaded, but more history exists.              IMM HEP B VACCINE (Series Information) Aged Out      No completion history exists for this topic.              HPV Vaccines (Series Information) Aged Out      No completion history exists for this topic.              IMM MENINGOCOCCAL ACWY VACCINE (Series Information) Aged Out      No completion history exists for this topic.              Discontinued - MAMMOGRAM  Discontinued        Frequency changed to  Never automatically (Topic No Longer Applies)    05/15/2017  [id number]              Discontinued - BONE DENSITY  Discontinued      11/15/2016  DS-BONE DENSITY STUDY (DEXA)              Discontinued - CERVICAL CANCER SCREENING  Discontinued        Frequency changed to Never automatically (Topic No Longer Applies)    04/10/2018  PATHOLOGY GYN SPECIMEN    04/10/2018  THINPREP PAP, REFLEX HPV ON ASC-US AND ABOVE                    Patient Care Team:  RAQUEL Vizcaino as PCP - General (Nurse Practitioner)  Francesco Hernandez M.D. as Consulting Physician (Nephrology)  Ag Cormier (Dentistry)  Hao Mendoza M.D. (Cardiovascular Disease (Cardiology))        Social History     Tobacco Use    Smoking status: Never    Smokeless tobacco: Never   Vaping Use    Vaping Use: Never used   Substance Use Topics    Alcohol use: No     Comment: holidays    Drug use: No     Family History   Problem Relation Age of Onset    Heart Disease Mother     Heart Disease Father     Lung Disease Sister     Heart Disease Brother     No Known Problems Brother     Cancer Neg Hx     Diabetes Neg Hx      She  has a past medical history of GERD (gastroesophageal reflux disease), Hyperparathyroidism (HCC), Hypertension, Other neutropenia (HCC) (6/10/2020), and Thrombocytopenia (HCC) (10/20/2020).   Past Surgical History:   Procedure Laterality Date    KNEE REPLACEMENT, TOTAL Left        Exam:   Vital signs reviewed   /58 (BP Location: Left arm, Patient Position: Sitting, BP Cuff Size: Adult)   Pulse 60   Temp 36.7 °C (98.1 °F) (Temporal)   Ht 1.524 m (5')   Wt 54.9 kg (121 lb)   SpO2 98%  Body mass index is 23.63 kg/m².    Hearing good.    Dentition good, saw dentist last week  Alert, oriented in no acute distress.  Eye contact is good, speech goal directed, affect calm.   Lungs: Normal effort, CTA bilaterally, no wheezes, rhonchi, or rales.    CV: Regular rate and rhythm with systolic murmur. No rubs or  gallops.  Ext: Using cane with ambulation. Right pedal pulse 1+. No pain on palpation to metatarsals. Right first MTP joint with pain, swelling and redness.       Assessment and Plan. The following treatment and monitoring plan is recommended:      1. Medicare annual wellness visit, initial  Acute uncomplicated problem.  Annual wellness exam completed today.    2. Acute gout involving toe of right foot, unspecified cause  Acute uncomplicated problem.  Discussed with patient that symptoms appear consistent with gout.  Start prednisone 20 mg daily for the next 5 days.  Check uric acid and sed rate.  - URIC ACID; Future  - Sed Rate; Future  - predniSONE (DELTASONE) 20 MG Tab; Take 1 Tablet by mouth every day for 5 days.  Dispense: 5 Tablet; Refill: 0    3. Other iron deficiency anemia  Chronic exacerbated problem.  Continue with ferrous sulfate 325 mg daily with daily vitamin C.  Plan to check updated labs around August 2023.  - ferrous sulfate 325 (65 Fe) MG tablet; Take 1 Tablet by mouth every day.  Dispense: 90 Tablet; Refill: 1  - CBC WITH DIFFERENTIAL; Future  - IRON/TOTAL IRON BIND; Future  - FERRITIN; Future    4. Essential hypertension, benign  Chronic stable problem.  Blood pressure is at goal.  Continue metoprolol SR 25 mg daily and amlodipine 5 mg daily.    5. Gastroesophageal reflux disease without esophagitis  Chronic stable problem.  Continue pantoprazole 20 mg daily.  Continue to avoid diet triggers.    6. Pure hypercholesterolemia  Chronic stable problem.  Continue with atorvastatin 40 mg at bedtime.    7. Stage 3b chronic kidney disease (HCC)  Chronic stable problem.  Blood pressure at goal.  Avoid NSAIDs.  Renal dose medications.    8. Paroxysmal A-fib (HCC)  Chronic stable problem.  She states that her cardiologist is leaving and encouraged her to call and schedule with new cardiologist.  Continue with metoprolol SR 25 mg every evening and clopidogrel 75 mg daily.      Services suggested: No services  needed at this time  Health Care Screening: Age-appropriate preventive services recommended by USPTF and ACIP covered by Medicare were discussed today. Services ordered if indicated and agreed upon by the patient.  Referrals offered: Community-based lifestyle interventions to reduce health risks and promote self-management and wellness, fall prevention, nutrition, physical activity, tobacco-use cessation, weight loss, and mental health services as per orders if indicated.    Discussion today about general wellness and lifestyle habits:    Prevent falls and reduce trip hazards; Cautioned about securing or removing rugs.  Have a working fire alarm and carbon monoxide detector;   Engage in regular physical activity and social activities     Follow-up: Return in about 2 months (around 9/1/2023) for Labs, iron deficiency .      Please note that this dictation was created using voice recognition software. I have made every reasonable attempt to correct obvious errors, but I expect that there are errors of grammar and possibly content that I did not discover before finalizing the note.

## 2023-06-28 LAB — ERYTHROCYTE [SEDIMENTATION RATE] IN BLOOD BY WESTERGREN METHOD: 14 MM/HOUR (ref 0–25)

## 2023-08-21 DIAGNOSIS — I21.02 ST ELEVATION MYOCARDIAL INFARCTION INVOLVING LEFT ANTERIOR DESCENDING (LAD) CORONARY ARTERY (HCC): ICD-10-CM

## 2023-08-22 RX ORDER — CLOPIDOGREL BISULFATE 75 MG/1
75 TABLET ORAL DAILY
Qty: 90 TABLET | Refills: 3 | Status: SHIPPED | OUTPATIENT
Start: 2023-08-22

## 2023-08-22 NOTE — TELEPHONE ENCOUNTER
Requested Prescriptions     Signed Prescriptions Disp Refills    clopidogrel (PLAVIX) 75 MG Tab 90 Tablet 3     Sig: TAKE 1 TABLET BY MOUTH DAILY     Authorizing Provider: ALLISON SIMPSON A.P.R.N.

## 2023-09-07 DIAGNOSIS — I10 ESSENTIAL HYPERTENSION: ICD-10-CM

## 2023-09-09 ENCOUNTER — HOSPITAL ENCOUNTER (OUTPATIENT)
Dept: LAB | Facility: MEDICAL CENTER | Age: 88
End: 2023-09-09
Attending: NURSE PRACTITIONER
Payer: MEDICARE

## 2023-09-09 DIAGNOSIS — D50.8 OTHER IRON DEFICIENCY ANEMIA: ICD-10-CM

## 2023-09-09 LAB
ACANTHOCYTES BLD QL SMEAR: NORMAL
ANISOCYTOSIS BLD QL SMEAR: ABNORMAL
BASOPHILS # BLD AUTO: 0.2 % (ref 0–1.8)
BASOPHILS # BLD: 0.01 K/UL (ref 0–0.12)
BURR CELLS BLD QL SMEAR: NORMAL
COMMENT 1642: NORMAL
EOSINOPHIL # BLD AUTO: 0.02 K/UL (ref 0–0.51)
EOSINOPHIL NFR BLD: 0.4 % (ref 0–6.9)
ERYTHROCYTE [DISTWIDTH] IN BLOOD BY AUTOMATED COUNT: 53.2 FL (ref 35.9–50)
FERRITIN SERPL-MCNC: 31.3 NG/ML (ref 10–291)
HCT VFR BLD AUTO: 33.6 % (ref 37–47)
HGB BLD-MCNC: 9.9 G/DL (ref 12–16)
IMM GRANULOCYTES # BLD AUTO: 0.01 K/UL (ref 0–0.11)
IMM GRANULOCYTES NFR BLD AUTO: 0.2 % (ref 0–0.9)
IRON SATN MFR SERPL: 13 % (ref 15–55)
IRON SERPL-MCNC: 41 UG/DL (ref 40–170)
LYMPHOCYTES # BLD AUTO: 1.5 K/UL (ref 1–4.8)
LYMPHOCYTES NFR BLD: 28.6 % (ref 22–41)
MCH RBC QN AUTO: 27.5 PG (ref 27–33)
MCHC RBC AUTO-ENTMCNC: 29.5 G/DL (ref 32.2–35.5)
MCV RBC AUTO: 93.3 FL (ref 81.4–97.8)
MICROCYTES BLD QL SMEAR: ABNORMAL
MONOCYTES # BLD AUTO: 0.8 K/UL (ref 0–0.85)
MONOCYTES NFR BLD AUTO: 15.2 % (ref 0–13.4)
MORPHOLOGY BLD-IMP: NORMAL
NEUTROPHILS # BLD AUTO: 2.91 K/UL (ref 1.82–7.42)
NEUTROPHILS NFR BLD: 55.4 % (ref 44–72)
NRBC # BLD AUTO: 0 K/UL
NRBC BLD-RTO: 0 /100 WBC (ref 0–0.2)
OVALOCYTES BLD QL SMEAR: NORMAL
PLATELET # BLD AUTO: 162 K/UL (ref 164–446)
PLATELET BLD QL SMEAR: NORMAL
PMV BLD AUTO: 12.3 FL (ref 9–12.9)
POIKILOCYTOSIS BLD QL SMEAR: NORMAL
RBC # BLD AUTO: 3.6 M/UL (ref 4.2–5.4)
RBC BLD AUTO: PRESENT
SCHISTOCYTES BLD QL SMEAR: NORMAL
TIBC SERPL-MCNC: 304 UG/DL (ref 250–450)
UIBC SERPL-MCNC: 263 UG/DL (ref 110–370)
WBC # BLD AUTO: 5.3 K/UL (ref 4.8–10.8)

## 2023-09-09 PROCEDURE — 85025 COMPLETE CBC W/AUTO DIFF WBC: CPT

## 2023-09-09 PROCEDURE — 36415 COLL VENOUS BLD VENIPUNCTURE: CPT

## 2023-09-09 PROCEDURE — 83540 ASSAY OF IRON: CPT

## 2023-09-09 PROCEDURE — 83550 IRON BINDING TEST: CPT

## 2023-09-09 PROCEDURE — 82728 ASSAY OF FERRITIN: CPT

## 2023-09-11 RX ORDER — AMLODIPINE BESYLATE 5 MG/1
5 TABLET ORAL DAILY
Qty: 90 TABLET | Refills: 3 | Status: SHIPPED | OUTPATIENT
Start: 2023-09-11

## 2023-09-11 NOTE — TELEPHONE ENCOUNTER
Requested Prescriptions     Signed Prescriptions Disp Refills    amLODIPine (NORVASC) 5 MG Tab 90 Tablet 3     Sig: TAKE 1 TABLET BY MOUTH  DAILY     Authorizing Provider: ALLISON SIMPSON A.P.R.N.

## 2023-10-10 DIAGNOSIS — I48.0 PAROXYSMAL A-FIB (HCC): ICD-10-CM

## 2023-10-10 DIAGNOSIS — I10 ESSENTIAL HYPERTENSION, BENIGN: ICD-10-CM

## 2023-10-11 RX ORDER — METOPROLOL SUCCINATE 25 MG/1
25 TABLET, EXTENDED RELEASE ORAL DAILY
Qty: 90 TABLET | Refills: 3 | Status: SHIPPED | OUTPATIENT
Start: 2023-10-11

## 2023-10-11 NOTE — TELEPHONE ENCOUNTER
Requested Prescriptions     Signed Prescriptions Disp Refills    metoprolol SR (TOPROL XL) 25 MG TABLET SR 24 HR 90 Tablet 3     Sig: TAKE 1 TABLET BY MOUTH ONCE  DAILY     Authorizing Provider: ALLISON SIMPSON A.P.R.N.

## 2023-10-19 ENCOUNTER — OFFICE VISIT (OUTPATIENT)
Dept: MEDICAL GROUP | Facility: PHYSICIAN GROUP | Age: 88
End: 2023-10-19
Payer: MEDICARE

## 2023-10-19 VITALS
TEMPERATURE: 97.7 F | BODY MASS INDEX: 23.36 KG/M2 | HEIGHT: 60 IN | OXYGEN SATURATION: 99 % | HEART RATE: 60 BPM | WEIGHT: 119 LBS | DIASTOLIC BLOOD PRESSURE: 64 MMHG | SYSTOLIC BLOOD PRESSURE: 132 MMHG

## 2023-10-19 DIAGNOSIS — D50.8 OTHER IRON DEFICIENCY ANEMIA: ICD-10-CM

## 2023-10-19 DIAGNOSIS — I21.02 ST ELEVATION MYOCARDIAL INFARCTION INVOLVING LEFT ANTERIOR DESCENDING (LAD) CORONARY ARTERY (HCC): ICD-10-CM

## 2023-10-19 PROBLEM — M10.9 ACUTE GOUT INVOLVING TOE OF RIGHT FOOT: Status: RESOLVED | Noted: 2023-06-27 | Resolved: 2023-10-19

## 2023-10-19 PROCEDURE — 99214 OFFICE O/P EST MOD 30 MIN: CPT | Performed by: NURSE PRACTITIONER

## 2023-10-19 PROCEDURE — 3075F SYST BP GE 130 - 139MM HG: CPT | Performed by: NURSE PRACTITIONER

## 2023-10-19 PROCEDURE — 3078F DIAST BP <80 MM HG: CPT | Performed by: NURSE PRACTITIONER

## 2023-10-19 RX ORDER — FERROUS SULFATE 325(65) MG
325 TABLET ORAL DAILY
Qty: 90 TABLET | Refills: 1 | Status: SHIPPED | OUTPATIENT
Start: 2023-10-19

## 2023-10-19 RX ORDER — ATORVASTATIN CALCIUM 40 MG/1
40 TABLET, FILM COATED ORAL
Qty: 90 TABLET | Refills: 3 | Status: SHIPPED | OUTPATIENT
Start: 2023-10-19

## 2023-10-19 ASSESSMENT — FIBROSIS 4 INDEX: FIB4 SCORE: 3.44

## 2023-10-19 NOTE — ASSESSMENT & PLAN NOTE
Continues daily ferrous sulfate 325 mg daily with vitamin C. Recent labs show improved iron levels but still low normal. Hemoglobin and hematocrit decreased. Does have easy bruising, currently on clopidogrel 75 mg daily for history STEMI. Denies hematuria, bloody stools, black stools, epistaxis. No recent falls or bladder issues.

## 2023-10-19 NOTE — PROGRESS NOTES
Subjective:     CC: Lab results     HPI:   Delphine presents today with the following:    Iron deficiency anemia  Continues daily ferrous sulfate 325 mg daily with vitamin C. Recent labs show improved iron levels but still low normal. Hemoglobin and hematocrit decreased. Does have easy bruising, currently on clopidogrel 75 mg daily for history STEMI. Denies hematuria, bloody stools, black stools, epistaxis. No recent falls or bladder issues.     Past Medical History:   Diagnosis Date    GERD (gastroesophageal reflux disease)     Hyperparathyroidism (HCC)     Hypertension     Other neutropenia (HCC) 6/10/2020    Thrombocytopenia (HCC) 10/20/2020       Social History     Tobacco Use    Smoking status: Never    Smokeless tobacco: Never   Vaping Use    Vaping Use: Never used   Substance Use Topics    Alcohol use: No     Comment: holidays    Drug use: No       Current Outpatient Medications Ordered in Epic   Medication Sig Dispense Refill    ferrous sulfate 325 (65 Fe) MG tablet Take 1 Tablet by mouth every day. 90 Tablet 1    atorvastatin (LIPITOR) 40 MG Tab Take 1 Tablet by mouth at bedtime. 90 Tablet 3    metoprolol SR (TOPROL XL) 25 MG TABLET SR 24 HR TAKE 1 TABLET BY MOUTH ONCE  DAILY 90 Tablet 3    amLODIPine (NORVASC) 5 MG Tab TAKE 1 TABLET BY MOUTH  DAILY 90 Tablet 3    clopidogrel (PLAVIX) 75 MG Tab TAKE 1 TABLET BY MOUTH DAILY 90 Tablet 3    Cinacalcet HCl 60 MG Tab Take 1 Tablet by mouth every day.      mupirocin (BACTROBAN) 2 % Ointment       pantoprazole (PROTONIX) 20 MG tablet TAKE 1 TABLET BY MOUTH DAILY 90 Tablet 3     No current Epic-ordered facility-administered medications on file.       Allergies:  Patient has no known allergies.    Health Maintenance: Reviewed. She has had her COVID booster, pneumonia, and influenza vaccine last week.       Objective:     Vital signs reviewed  Exam:  /64 (BP Location: Right arm, Patient Position: Sitting, BP Cuff Size: Adult)   Pulse 60   Temp 36.5 °C (97.7 °F)  (Temporal)   Ht 1.524 m (5')   Wt 54 kg (119 lb)   SpO2 99%   BMI 23.24 kg/m²  Body mass index is 23.24 kg/m².    Gen: Alert and oriented, No apparent distress.  Neck: Neck is supple without lymphadenopathy.  Lungs: Normal effort, CTA bilaterally, no wheezes, rhonchi, or rales  CV: Regular rate and rhythm with systolic murmur. No rubs or gallops.  Ext: No clubbing, cyanosis, edema.      Assessment & Plan:     89 y.o. female with the following -     1. Other iron deficiency anemia  Chronic exacerbated problem. Levels improving, but low normal. Continue ferrous sulfate daily with vitamin C. Order labs at February 2024 appointment.   - ferrous sulfate 325 (65 Fe) MG tablet; Take 1 Tablet by mouth every day.  Dispense: 90 Tablet; Refill: 1    2. ST elevation myocardial infarction involving left anterior descending (LAD) coronary artery (HCC)  Chronic stable problem. Continue atorvastatin 40 mg at bedtime.   - atorvastatin (LIPITOR) 40 MG Tab; Take 1 Tablet by mouth at bedtime.  Dispense: 90 Tablet; Refill: 3        Return in about 4 months (around 2/19/2024) for Hypertension.    Please note that this dictation was created using voice recognition software. I have made every reasonable attempt to correct obvious errors, but I expect that there are errors of grammar and possibly content that I did not discover before finalizing the note.

## 2023-11-29 ENCOUNTER — PATIENT MESSAGE (OUTPATIENT)
Dept: HEALTH INFORMATION MANAGEMENT | Facility: OTHER | Age: 88
End: 2023-11-29

## 2024-02-15 ENCOUNTER — OFFICE VISIT (OUTPATIENT)
Dept: CARDIOLOGY | Facility: MEDICAL CENTER | Age: 89
End: 2024-02-15
Attending: INTERNAL MEDICINE
Payer: MEDICARE

## 2024-02-15 VITALS
HEIGHT: 60 IN | WEIGHT: 117 LBS | OXYGEN SATURATION: 100 % | SYSTOLIC BLOOD PRESSURE: 110 MMHG | HEART RATE: 58 BPM | DIASTOLIC BLOOD PRESSURE: 50 MMHG | BODY MASS INDEX: 22.97 KG/M2 | RESPIRATION RATE: 16 BRPM

## 2024-02-15 DIAGNOSIS — E78.5 DYSLIPIDEMIA: ICD-10-CM

## 2024-02-15 DIAGNOSIS — N18.32 STAGE 3B CHRONIC KIDNEY DISEASE: ICD-10-CM

## 2024-02-15 DIAGNOSIS — I35.8 AORTIC VALVE SCLEROSIS: ICD-10-CM

## 2024-02-15 DIAGNOSIS — I25.10 CORONARY ARTERY DISEASE INVOLVING NATIVE CORONARY ARTERY OF NATIVE HEART WITHOUT ANGINA PECTORIS: ICD-10-CM

## 2024-02-15 DIAGNOSIS — I10 ESSENTIAL HYPERTENSION: ICD-10-CM

## 2024-02-15 PROBLEM — I48.0 PAROXYSMAL A-FIB (HCC): Status: RESOLVED | Noted: 2022-08-06 | Resolved: 2024-02-15

## 2024-02-15 PROCEDURE — 99212 OFFICE O/P EST SF 10 MIN: CPT | Performed by: INTERNAL MEDICINE

## 2024-02-15 PROCEDURE — 99214 OFFICE O/P EST MOD 30 MIN: CPT | Performed by: INTERNAL MEDICINE

## 2024-02-15 PROCEDURE — 3078F DIAST BP <80 MM HG: CPT | Performed by: INTERNAL MEDICINE

## 2024-02-15 PROCEDURE — 3074F SYST BP LT 130 MM HG: CPT | Performed by: INTERNAL MEDICINE

## 2024-02-15 ASSESSMENT — FIBROSIS 4 INDEX: FIB4 SCORE: 3.44

## 2024-02-15 NOTE — PROGRESS NOTES
Bedside shift change report given to German Gusman RN (oncoming nurse) by Brice Barrett RN (offgoing nurse). Report included the following information SBAR, Kardex, Intake/Output, MAR and Recent Results. Chief Complaint   Patient presents with    Atrial Fibrillation     Follow up        Subjective     Delphine Reeves is a 89 y.o. female who presents today for initial visit in follow-up of CAD characterized by CAD characterized by apical STEMI 8/2022 with unsuccessful revascularization, subsequent preserved LVEF and no angina.  Also has a history of dyslipidemia and hypertension.  Had a single episode of provoked atrial fibrillation surrounding her AMI that has not returned and is thus resolved.  Feels well has no cardiovascular complaints and appears younger than stated age.    Past Medical History:   Diagnosis Date    GERD (gastroesophageal reflux disease)     Hyperparathyroidism (HCC)     Hypertension     Other neutropenia (HCC) 6/10/2020    Thrombocytopenia (HCC) 10/20/2020     Past Surgical History:   Procedure Laterality Date    KNEE REPLACEMENT, TOTAL Left      Family History   Problem Relation Age of Onset    Heart Disease Mother     Heart Disease Father     Lung Disease Sister     Heart Disease Brother     No Known Problems Brother     Cancer Neg Hx     Diabetes Neg Hx      Social History     Socioeconomic History    Marital status:      Spouse name: Not on file    Number of children: Not on file    Years of education: Not on file    Highest education level: Not on file   Occupational History    Not on file   Tobacco Use    Smoking status: Never    Smokeless tobacco: Never   Vaping Use    Vaping Use: Never used   Substance and Sexual Activity    Alcohol use: No     Comment: holidays    Drug use: No    Sexual activity: Not Currently     Comment:  x 71 yrs, 3 sons, [facility]    Other Topics Concern    Not on file   Social History Narrative    Not on file     Social Determinants of Health     Financial Resource Strain: Not on file   Food Insecurity: Not on file   Transportation Needs: Not on file   Physical Activity: Not on file   Stress: Not on file   Social Connections: Not on file    Intimate Partner Violence: Not on file   Housing Stability: Not on file     No Known Allergies  Outpatient Encounter Medications as of 2/15/2024   Medication Sig Dispense Refill    ferrous sulfate 325 (65 Fe) MG tablet Take 1 Tablet by mouth every day. 90 Tablet 1    atorvastatin (LIPITOR) 40 MG Tab Take 1 Tablet by mouth at bedtime. 90 Tablet 3    metoprolol SR (TOPROL XL) 25 MG TABLET SR 24 HR TAKE 1 TABLET BY MOUTH ONCE  DAILY 90 Tablet 3    amLODIPine (NORVASC) 5 MG Tab TAKE 1 TABLET BY MOUTH  DAILY 90 Tablet 3    clopidogrel (PLAVIX) 75 MG Tab TAKE 1 TABLET BY MOUTH DAILY 90 Tablet 3    Cinacalcet HCl 60 MG Tab Take 1 Tablet by mouth every day.      mupirocin (BACTROBAN) 2 % Ointment       pantoprazole (PROTONIX) 20 MG tablet TAKE 1 TABLET BY MOUTH DAILY 90 Tablet 3     No facility-administered encounter medications on file as of 2/15/2024.     Review of Systems   All other systems reviewed and are negative.             Objective     /50 (BP Location: Left arm, Patient Position: Sitting)   Pulse (!) 58   Resp 16   Ht 1.524 m (5')   Wt 53.1 kg (117 lb)   SpO2 100%   BMI 22.85 kg/m²     Physical Exam  Vitals and nursing note reviewed.   Constitutional:       General: She is not in acute distress.     Appearance: Normal appearance.      Comments: Appears younger than stated age   HENT:      Head: Normocephalic and atraumatic.      Right Ear: External ear normal.      Left Ear: External ear normal.      Nose: Nose normal.   Eyes:      Conjunctiva/sclera: Conjunctivae normal.   Cardiovascular:      Rate and Rhythm: Normal rate and regular rhythm.      Pulses: Normal pulses.      Heart sounds: Murmur heard.   Pulmonary:      Effort: Pulmonary effort is normal. No respiratory distress.      Breath sounds: Normal breath sounds.   Abdominal:      General: There is no distension.      Palpations: Abdomen is soft.   Musculoskeletal:      Cervical back: No rigidity or tenderness.      Right lower leg: No  "edema.      Left lower leg: No edema.   Skin:     General: Skin is warm and dry.      Capillary Refill: Capillary refill takes 2 to 3 seconds.   Neurological:      General: No focal deficit present.      Mental Status: She is alert and oriented to person, place, and time.   Psychiatric:         Mood and Affect: Mood normal.         Behavior: Behavior normal.         Thought Content: Thought content normal.     LABS:  Lab Results   Component Value Date/Time    CHOLSTRLTOT 112 01/20/2023 09:46 AM    LDL 56 01/20/2023 09:46 AM    HDL 42 01/20/2023 09:46 AM    TRIGLYCERIDE 69 01/20/2023 09:46 AM       Lab Results   Component Value Date/Time    WBC 5.3 09/09/2023 10:28 AM    RBC 3.60 (L) 09/09/2023 10:28 AM    HEMOGLOBIN 9.9 (L) 09/09/2023 10:28 AM    HEMATOCRIT 33.6 (L) 09/09/2023 10:28 AM    MCV 93.3 09/09/2023 10:28 AM    NEUTSPOLYS 55.40 09/09/2023 10:28 AM    LYMPHOCYTES 28.60 09/09/2023 10:28 AM    MONOCYTES 15.20 (H) 09/09/2023 10:28 AM    EOSINOPHILS 0.40 09/09/2023 10:28 AM    BASOPHILS 0.20 09/09/2023 10:28 AM    HYPOCHROMIA 1+ 11/10/2022 12:13 PM    ANISOCYTOSIS 1+ 09/09/2023 10:28 AM     Lab Results   Component Value Date/Time    SODIUM 140 05/15/2023 08:29 AM    POTASSIUM 4.8 05/15/2023 08:29 AM    CHLORIDE 104 05/15/2023 08:29 AM    CO2 21 05/15/2023 08:29 AM    GLUCOSE 102 (H) 05/15/2023 08:29 AM    BUN 35 (H) 05/15/2023 08:29 AM    CREATININE 1.39 05/15/2023 08:29 AM     Lab Results   Component Value Date    HBA1C 4.5 01/20/2023      Lab Results   Component Value Date/Time    ALKPHOSPHAT 88 01/20/2023 09:46 AM    ASTSGOT 18 01/20/2023 09:46 AM    ALTSGPT 9 01/20/2023 09:46 AM    TBILIRUBIN 0.6 01/20/2023 09:46 AM      Lab Results   Component Value Date/Time    BNPBTYPENAT 20 01/04/2016 08:35 PM      No results found for: \"TSH\"  Lab Results   Component Value Date/Time    PROTHROMBTM 18.5 (H) 08/06/2022 10:11 PM    INR 1.57 (H) 08/06/2022 10:11 PM        Imaging reviewed           Assessment & Plan     1. " Coronary artery disease involving native coronary artery of native heart without angina pectoris  EC-ECHOCARDIOGRAM COMPLETE W/O CONT      2. Dyslipidemia  Comp Metabolic Panel    Lipid Profile      3. Essential hypertension        4. Stage 3b chronic kidney disease (HCC)        5. Aortic valve sclerosis  EC-ECHOCARDIOGRAM COMPLETE W/O CONT          Medical Decision Making: Today's Assessment/Status/Plan:          Overall doing well goal LDL less than 70 which she has previously been achieving you for recheck.  But a couple of years since her AMI has not had a recheck of her ejection fraction or aortic valve disease.  Recommend follow-up echocardiogram.  Recommend continued Plavix or aspirin she is currently on Plavix monotherapy and I think she should continue this.  Should she have a recurrence of atrial fibrillation this would then be in paroxysmal atrial fibrillation with unprovoked single episode and anticoagulation within be warranted in the lieu of her antiplatelet therapy.    She will follow-up routinely

## 2024-02-17 ENCOUNTER — HOSPITAL ENCOUNTER (OUTPATIENT)
Dept: LAB | Facility: MEDICAL CENTER | Age: 89
End: 2024-02-17
Attending: INTERNAL MEDICINE
Payer: MEDICARE

## 2024-02-17 DIAGNOSIS — E78.5 DYSLIPIDEMIA: ICD-10-CM

## 2024-02-17 LAB
ALBUMIN SERPL BCP-MCNC: 3.4 G/DL (ref 3.2–4.9)
ALBUMIN/GLOB SERPL: 1.1 G/DL
ALP SERPL-CCNC: 70 U/L (ref 30–99)
ALT SERPL-CCNC: 11 U/L (ref 2–50)
ANION GAP SERPL CALC-SCNC: 16 MMOL/L (ref 7–16)
AST SERPL-CCNC: 18 U/L (ref 12–45)
BILIRUB SERPL-MCNC: 0.4 MG/DL (ref 0.1–1.5)
BUN SERPL-MCNC: 25 MG/DL (ref 8–22)
CALCIUM ALBUM COR SERPL-MCNC: 7.3 MG/DL (ref 8.5–10.5)
CALCIUM SERPL-MCNC: 6.8 MG/DL (ref 8.5–10.5)
CHLORIDE SERPL-SCNC: 106 MMOL/L (ref 96–112)
CHOLEST SERPL-MCNC: 116 MG/DL (ref 100–199)
CO2 SERPL-SCNC: 21 MMOL/L (ref 20–33)
CREAT SERPL-MCNC: 1.45 MG/DL (ref 0.5–1.4)
GFR SERPLBLD CREATININE-BSD FMLA CKD-EPI: 33 ML/MIN/1.73 M 2
GLOBULIN SER CALC-MCNC: 3.1 G/DL (ref 1.9–3.5)
GLUCOSE SERPL-MCNC: 99 MG/DL (ref 65–99)
HDLC SERPL-MCNC: 40 MG/DL
LDLC SERPL CALC-MCNC: 55 MG/DL
POTASSIUM SERPL-SCNC: 4.3 MMOL/L (ref 3.6–5.5)
PROT SERPL-MCNC: 6.5 G/DL (ref 6–8.2)
SODIUM SERPL-SCNC: 143 MMOL/L (ref 135–145)
TRIGL SERPL-MCNC: 104 MG/DL (ref 0–149)

## 2024-02-17 PROCEDURE — 36415 COLL VENOUS BLD VENIPUNCTURE: CPT

## 2024-02-17 PROCEDURE — 80053 COMPREHEN METABOLIC PANEL: CPT

## 2024-02-17 PROCEDURE — 80061 LIPID PANEL: CPT

## 2024-03-14 ENCOUNTER — APPOINTMENT (OUTPATIENT)
Dept: CARDIOLOGY | Facility: MEDICAL CENTER | Age: 89
End: 2024-03-14
Attending: INTERNAL MEDICINE
Payer: MEDICARE

## 2024-03-20 ENCOUNTER — APPOINTMENT (OUTPATIENT)
Dept: MEDICAL GROUP | Facility: PHYSICIAN GROUP | Age: 89
End: 2024-03-20
Payer: MEDICARE

## 2024-04-27 ENCOUNTER — HOSPITAL ENCOUNTER (OUTPATIENT)
Dept: LAB | Facility: MEDICAL CENTER | Age: 89
End: 2024-04-27
Attending: INTERNAL MEDICINE
Payer: MEDICARE

## 2024-04-27 LAB
CREAT UR-MCNC: 116.74 MG/DL
FERRITIN SERPL-MCNC: 43.5 NG/ML (ref 10–291)
MICROALBUMIN UR-MCNC: 4 MG/DL
MICROALBUMIN/CREAT UR: 34 MG/G (ref 0–30)

## 2024-04-27 PROCEDURE — 36415 COLL VENOUS BLD VENIPUNCTURE: CPT

## 2024-04-27 PROCEDURE — 82570 ASSAY OF URINE CREATININE: CPT

## 2024-04-27 PROCEDURE — 82043 UR ALBUMIN QUANTITATIVE: CPT

## 2024-04-27 PROCEDURE — 82728 ASSAY OF FERRITIN: CPT

## 2024-05-02 DIAGNOSIS — K21.9 GASTROESOPHAGEAL REFLUX DISEASE WITHOUT ESOPHAGITIS: ICD-10-CM

## 2024-05-02 RX ORDER — PANTOPRAZOLE SODIUM 20 MG/1
TABLET, DELAYED RELEASE ORAL
Qty: 90 TABLET | Refills: 3 | Status: SHIPPED | OUTPATIENT
Start: 2024-05-02

## 2024-07-02 ENCOUNTER — OFFICE VISIT (OUTPATIENT)
Dept: MEDICAL GROUP | Facility: PHYSICIAN GROUP | Age: 89
End: 2024-07-02
Payer: MEDICARE

## 2024-07-02 VITALS
HEART RATE: 51 BPM | SYSTOLIC BLOOD PRESSURE: 138 MMHG | BODY MASS INDEX: 22.97 KG/M2 | DIASTOLIC BLOOD PRESSURE: 52 MMHG | HEIGHT: 60 IN | TEMPERATURE: 97.2 F | OXYGEN SATURATION: 98 % | WEIGHT: 117 LBS

## 2024-07-02 DIAGNOSIS — E21.3 HYPERPARATHYROIDISM (HCC): ICD-10-CM

## 2024-07-02 DIAGNOSIS — Z00.00 MEDICARE ANNUAL WELLNESS VISIT, SUBSEQUENT: Primary | ICD-10-CM

## 2024-07-02 DIAGNOSIS — E78.00 PURE HYPERCHOLESTEROLEMIA: ICD-10-CM

## 2024-07-02 DIAGNOSIS — I35.8 AORTIC VALVE SCLEROSIS: ICD-10-CM

## 2024-07-02 DIAGNOSIS — N18.32 STAGE 3B CHRONIC KIDNEY DISEASE: ICD-10-CM

## 2024-07-02 DIAGNOSIS — I10 ESSENTIAL HYPERTENSION, BENIGN: ICD-10-CM

## 2024-07-02 DIAGNOSIS — I25.10 CORONARY ARTERY DISEASE INVOLVING NATIVE CORONARY ARTERY OF NATIVE HEART WITHOUT ANGINA PECTORIS: ICD-10-CM

## 2024-07-02 DIAGNOSIS — K21.9 GASTROESOPHAGEAL REFLUX DISEASE WITHOUT ESOPHAGITIS: ICD-10-CM

## 2024-07-02 DIAGNOSIS — D50.8 OTHER IRON DEFICIENCY ANEMIAS: ICD-10-CM

## 2024-07-02 PROBLEM — D64.89 OTHER SPECIFIED ANEMIAS: Status: RESOLVED | Noted: 2022-08-06 | Resolved: 2024-07-02

## 2024-07-02 PROBLEM — I25.2 HISTORY OF ST ELEVATION MYOCARDIAL INFARCTION (STEMI): Status: ACTIVE | Noted: 2022-08-06

## 2024-07-02 PROBLEM — T14.8XXA BLISTER: Status: RESOLVED | Noted: 2022-07-25 | Resolved: 2024-07-02

## 2024-07-02 PROCEDURE — 3075F SYST BP GE 130 - 139MM HG: CPT | Performed by: NURSE PRACTITIONER

## 2024-07-02 PROCEDURE — G0439 PPPS, SUBSEQ VISIT: HCPCS | Performed by: NURSE PRACTITIONER

## 2024-07-02 PROCEDURE — 3078F DIAST BP <80 MM HG: CPT | Performed by: NURSE PRACTITIONER

## 2024-07-02 ASSESSMENT — PATIENT HEALTH QUESTIONNAIRE - PHQ9: CLINICAL INTERPRETATION OF PHQ2 SCORE: 0

## 2024-07-02 ASSESSMENT — ACTIVITIES OF DAILY LIVING (ADL): BATHING_REQUIRES_ASSISTANCE: 0

## 2024-07-02 ASSESSMENT — FIBROSIS 4 INDEX: FIB4 SCORE: 3.15

## 2024-07-02 ASSESSMENT — ENCOUNTER SYMPTOMS: GENERAL WELL-BEING: EXCELLENT

## 2024-07-11 DIAGNOSIS — I21.02 ST ELEVATION MYOCARDIAL INFARCTION INVOLVING LEFT ANTERIOR DESCENDING (LAD) CORONARY ARTERY (HCC): ICD-10-CM

## 2024-07-15 RX ORDER — CLOPIDOGREL BISULFATE 75 MG/1
75 TABLET ORAL DAILY
Qty: 90 TABLET | Refills: 3 | Status: SHIPPED | OUTPATIENT
Start: 2024-07-15

## 2024-08-09 DIAGNOSIS — I10 ESSENTIAL HYPERTENSION: ICD-10-CM

## 2024-08-12 RX ORDER — AMLODIPINE BESYLATE 5 MG/1
5 TABLET ORAL DAILY
Qty: 90 TABLET | Refills: 3 | Status: SHIPPED | OUTPATIENT
Start: 2024-08-12

## 2024-08-12 NOTE — TELEPHONE ENCOUNTER
Received request via: Pharmacy    Was the patient seen in the last year in this department? Yes    Does the patient have an active prescription (recently filled or refills available) for medication(s) requested? No    [facility]    Does the patient have [facility] and need 100-day supply? (This applies to ALL medications) Patient does not have SCP

## 2024-08-25 DIAGNOSIS — I21.02 ST ELEVATION MYOCARDIAL INFARCTION INVOLVING LEFT ANTERIOR DESCENDING (LAD) CORONARY ARTERY (HCC): ICD-10-CM

## 2024-08-25 DIAGNOSIS — E83.51 HYPOCALCEMIA: ICD-10-CM

## 2024-08-25 DIAGNOSIS — I10 ESSENTIAL HYPERTENSION, BENIGN: ICD-10-CM

## 2024-08-25 DIAGNOSIS — I48.0 PAROXYSMAL A-FIB (HCC): ICD-10-CM

## 2024-08-27 RX ORDER — METOPROLOL SUCCINATE 25 MG/1
25 TABLET, EXTENDED RELEASE ORAL DAILY
Qty: 90 TABLET | Refills: 3 | Status: SHIPPED | OUTPATIENT
Start: 2024-08-27

## 2024-08-27 RX ORDER — ATORVASTATIN CALCIUM 40 MG/1
40 TABLET, FILM COATED ORAL
Qty: 90 TABLET | Refills: 3 | Status: SHIPPED | OUTPATIENT
Start: 2024-08-27

## 2024-08-27 NOTE — TELEPHONE ENCOUNTER
Received request via: Pharmacy    Was the patient seen in the last year in this department? Yes    Does the patient have an active prescription (recently filled or refills available) for medication(s) requested? No    [facility] Name: [facility]    Does the patient have group home Plus and need 100-day supply? (This applies to ALL medications) Patient does not have SCP

## 2024-08-28 NOTE — TELEPHONE ENCOUNTER
Requested Prescriptions     Signed Prescriptions Disp Refills    atorvastatin (LIPITOR) 40 MG Tab 90 Tablet 3     Sig: TAKE 1 TABLET BY MOUTH AT  BEDTIME     Authorizing Provider: ALLISON SIMPSON    metoprolol SR (TOPROL XL) 25 MG TABLET SR 24 HR 90 Tablet 3     Sig: TAKE 1 TABLET BY MOUTH ONCE  DAILY     Authorizing Provider: ALLISON SIMPSON     Check updated CMP and PTH with previously ordered labs    MYNOR Vizcaino.

## 2024-10-26 ENCOUNTER — HOSPITAL ENCOUNTER (OUTPATIENT)
Dept: LAB | Facility: MEDICAL CENTER | Age: 89
End: 2024-10-26
Attending: INTERNAL MEDICINE
Payer: MEDICARE

## 2024-10-26 LAB
IRON SATN MFR SERPL: 16 % (ref 15–55)
IRON SERPL-MCNC: 49 UG/DL (ref 40–170)
TIBC SERPL-MCNC: 304 UG/DL (ref 250–450)
UIBC SERPL-MCNC: 255 UG/DL (ref 110–370)

## 2024-10-26 PROCEDURE — 83540 ASSAY OF IRON: CPT

## 2024-10-26 PROCEDURE — 36415 COLL VENOUS BLD VENIPUNCTURE: CPT

## 2024-10-26 PROCEDURE — 83550 IRON BINDING TEST: CPT

## 2024-11-02 ENCOUNTER — HOSPITAL ENCOUNTER (EMERGENCY)
Facility: MEDICAL CENTER | Age: 89
End: 2024-11-02
Attending: EMERGENCY MEDICINE
Payer: MEDICARE

## 2024-11-02 ENCOUNTER — APPOINTMENT (OUTPATIENT)
Dept: RADIOLOGY | Facility: MEDICAL CENTER | Age: 89
End: 2024-11-02
Attending: EMERGENCY MEDICINE
Payer: MEDICARE

## 2024-11-02 VITALS
HEART RATE: 60 BPM | RESPIRATION RATE: 20 BRPM | WEIGHT: 117 LBS | BODY MASS INDEX: 23.59 KG/M2 | HEIGHT: 59 IN | DIASTOLIC BLOOD PRESSURE: 69 MMHG | OXYGEN SATURATION: 95 % | SYSTOLIC BLOOD PRESSURE: 155 MMHG | TEMPERATURE: 97 F

## 2024-11-02 DIAGNOSIS — S01.81XA FACIAL LACERATION, INITIAL ENCOUNTER: ICD-10-CM

## 2024-11-02 DIAGNOSIS — S62.245A CLOSED NONDISPLACED FRACTURE OF SHAFT OF FIRST METACARPAL BONE OF LEFT HAND, INITIAL ENCOUNTER: ICD-10-CM

## 2024-11-02 DIAGNOSIS — S09.90XA CLOSED HEAD INJURY, INITIAL ENCOUNTER: ICD-10-CM

## 2024-11-02 PROCEDURE — 304999 HCHG REPAIR-SIMPLE/INTERMED LEVEL 1

## 2024-11-02 PROCEDURE — 304217 HCHG IRRIGATION SYSTEM

## 2024-11-02 PROCEDURE — 303747 HCHG EXTRA SUTURE

## 2024-11-02 PROCEDURE — 700101 HCHG RX REV CODE 250: Performed by: EMERGENCY MEDICINE

## 2024-11-02 PROCEDURE — 70450 CT HEAD/BRAIN W/O DYE: CPT

## 2024-11-02 PROCEDURE — 29125 APPL SHORT ARM SPLINT STATIC: CPT

## 2024-11-02 PROCEDURE — 304991 HCHG REPAIR-COMPLEX-LVL 1, ADD 5 CM

## 2024-11-02 PROCEDURE — 302874 HCHG BANDAGE ACE 2 OR 3""

## 2024-11-02 PROCEDURE — 304992 HCHG REPAIR-COMPLEX-LVL 1,1.1-7.5CM

## 2024-11-02 PROCEDURE — 99284 EMERGENCY DEPT VISIT MOD MDM: CPT

## 2024-11-02 PROCEDURE — 73130 X-RAY EXAM OF HAND: CPT | Mod: LT

## 2024-11-02 RX ORDER — LIDOCAINE HYDROCHLORIDE AND EPINEPHRINE BITARTRATE 20; .01 MG/ML; MG/ML
10 INJECTION, SOLUTION SUBCUTANEOUS ONCE
Status: COMPLETED | OUTPATIENT
Start: 2024-11-02 | End: 2024-11-02

## 2024-11-02 RX ADMIN — LIDOCAINE HYDROCHLORIDE,EPINEPHRINE BITARTRATE 10 ML: 20; .01 INJECTION, SOLUTION INFILTRATION; PERINEURAL at 14:31

## 2024-11-02 ASSESSMENT — FIBROSIS 4 INDEX: FIB4 SCORE: 3.15

## 2024-11-02 NOTE — DISCHARGE INSTRUCTIONS
Your stitches should be removed in 5-7 days. Return immediately to the emergency department at the first sign of infection including increasing redness, increasing pain, pus in the wound, fever or if you have any other concerns.

## 2024-11-02 NOTE — ED NOTES
Chief Complaint   Patient presents with    T-5000 Head Injury     Bruising noted to right eye, chin. Lac to right forehead.  Large bruising noted to left hand CMS +.

## 2024-11-02 NOTE — ED PROVIDER NOTES
ED Provider Note    CHIEF COMPLAINT  TBI    EXTERNAL RECORDS REVIEWED  Outpatient family practice 7/2/2024, annual wellness visit, significant for coronary disease on Plavix    HPI/ROS    Delphine Reeves is a 89 y.o. female who presents via EMS for evaluation of a ground-level fall and head injury, code TBI.  She is on Plavix.  Was coming out of a restaurant when she tripped on the threshold of the door landing on her face.  EMS reports multiple lacerations and bruising to her left hand.  She does not have any pain however, denies headache and neck pain, no back pain, no chest pain or abdominal pain.  No weakness numbness or tingling.  No vision changes.  She has no other complaints.    PAST MEDICAL HISTORY   has a past medical history of GERD (gastroesophageal reflux disease), Hyperparathyroidism (HCC), Hypertension, Other neutropenia (HCC) (6/10/2020), and Thrombocytopenia (HCC) (10/20/2020).    SURGICAL HISTORY   has a past surgical history that includes knee replacement, total (Left).    FAMILY HISTORY  Family History   Problem Relation Age of Onset    Heart Disease Mother     Heart Disease Father     Lung Disease Sister     Heart Disease Brother     No Known Problems Brother     Cancer Neg Hx     Diabetes Neg Hx        SOCIAL HISTORY  Social History     Tobacco Use    Smoking status: Never    Smokeless tobacco: Never   Vaping Use    Vaping status: Never Used   Substance and Sexual Activity    Alcohol use: No     Comment: holidays    Drug use: No    Sexual activity: Not Currently     Comment:  x 71 yrs, 3 sons, [facility]        CURRENT MEDICATIONS  Home Medications    **Home medications have not yet been reviewed for this encounter**       Audit from Redirected Encounters    **Home medications have not yet been reviewed for this encounter**         ALLERGIES  No Known Allergies    PHYSICAL EXAM  VITAL SIGNS: BP (!) 155/69   Pulse 60   Temp 36.1 °C (97 °F) (Tympanic)   Resp 20   Ht 1.499 m (4'  "11\")   Wt 53.1 kg (117 lb)   SpO2 95%   BMI 23.63 kg/m²    Constitutional: An alert patient in no acute distress  HENT: 2 lacerations:                Right forehead, complex, macerated, 7 cm total linear distance.                 Right cheek, 3 cm, subcutaneous  Neck: Non tender, full range of motion.  No tenderness whatsoever even on firm palpation.  Thorax: No respiratory distress.  Lungs are clear to auscultation with symmetric air movement, regular cardiac rhythm  Abdomen: Soft, with no tenderness.  Extremities/Musculoskeletal: Ecchymosis to left hand, full range of motion, no deformity, no pain with range of motion.  Neurologic: Alert & oriented. No focal deficits observed.        RADIOLOGY/PROCEDURES   I have independently interpreted the diagnostic imaging associated with this visit and am waiting the final reading from the radiologist.   My preliminary interpretation is as follows: No ICH    Radiologist interpretation:  DX-HAND 3+ LEFT   Final Result      1.  Acute displaced and angulated fracture of first metacarpal bone proximally.      2.  Osteoarthritis and subluxations noted.      CT-HEAD W/O   Final Result         NO ACUTE ABNORMALITIES ARE NOTED ON CT SCAN OF THE HEAD.      Cerebral atrophy appears to be present.                   Laceration Repair Procedure Note    Indication: 2 facial lacerations    Procedure: The patient was placed in the appropriate position and anesthesia around the lacerations were obtained by infiltration using 1% Lidocaine with epinephrine. The wound was highly contaminated .The area was then irrigated with normal saline, explored with no foreign bodies discovered, and draped in a sterile fashion.     The forehead laceration was closed with 5-0 Ethilon using interrupted sutures.       The cheek laceration was closed with 5-0 Ethilon using interrupted sutures.      Total repaired wound length: 10 cm.     Other Items: Suture count: 17    The patient tolerated the procedure " well.    Complications: None      COURSE & MEDICAL DECISION MAKING    ASSESSMENT, COURSE AND PLAN  Care Narrative: This is a 89-year-old female on Plavix with a ground-level fall and head injury, code TBI evaluated emergently upon her arrival at the charge desk.  Multiple lacerations, no focal neurologic complaints, no neck or back pain or tenderness, no other complaints or findings other than some bruising of the right hand.  CT of the head is negative for ICH.  X-ray of the hand excludes acute fracture.  The lacerations were painstakingly repaired here in the emergency department.  She is discharged home in stable condition with strict return instruction for            DISPOSITION AND DISCUSSIONS    Decision tools and prescription drugs considered including, but not limited to: I did consider a prescription for prophylactic antibiotics, these were cleansed with copious saline, I think the risks of a course of antibiotics outweigh the benefit in this case.    FINAL DIAGNOSIS  1. Facial laceration, initial encounter    2. Closed head injury, initial encounter    3. Closed nondisplaced fracture of shaft of first metacarpal bone of left hand, initial encounter         Electronically signed by: David Cruz M.D., 11/2/2024 2:03 PM

## 2024-11-02 NOTE — ED TRIAGE NOTES
Pt had a mechanical glf while exiting a restaurant this afternoon, striking head. Pt denies blood thinner use, denies midline cervical pain on palpation. Laceration to right forehead noted on arrival, bleeding controlled with dressing. Pt a&ox4, denies pain on arrival.  Pt states she does not know last tetanus vacc date.

## 2024-11-02 NOTE — ED NOTES
All discharge instructions given to pt.   Pt verbalized understanding of all discharge instructions. All lines removed prior to discharge. All questions answered. All personal belongings sent with pt. Pt wheeled to lobby.

## 2024-11-02 NOTE — ED NOTES
TBI protocol initiated on patient arrival, pt accompanied to CT by this RN, returned to room 21 following CT. Pt undressed in gown, all monitors in place, report given to SEAN Abdul.

## 2024-11-09 ENCOUNTER — OFFICE VISIT (OUTPATIENT)
Dept: URGENT CARE | Facility: PHYSICIAN GROUP | Age: 89
End: 2024-11-09
Payer: MEDICARE

## 2024-11-09 VITALS
RESPIRATION RATE: 16 BRPM | BODY MASS INDEX: 24.01 KG/M2 | HEIGHT: 59 IN | WEIGHT: 119.1 LBS | OXYGEN SATURATION: 99 % | DIASTOLIC BLOOD PRESSURE: 84 MMHG | SYSTOLIC BLOOD PRESSURE: 136 MMHG | TEMPERATURE: 96.6 F | HEART RATE: 55 BPM

## 2024-11-09 DIAGNOSIS — Z48.02 VISIT FOR SUTURE REMOVAL: ICD-10-CM

## 2024-11-09 PROCEDURE — 3075F SYST BP GE 130 - 139MM HG: CPT | Performed by: PHYSICIAN ASSISTANT

## 2024-11-09 PROCEDURE — 99212 OFFICE O/P EST SF 10 MIN: CPT | Performed by: PHYSICIAN ASSISTANT

## 2024-11-09 PROCEDURE — 3079F DIAST BP 80-89 MM HG: CPT | Performed by: PHYSICIAN ASSISTANT

## 2024-11-09 PROCEDURE — 15853 REMOVAL SUTR/STAPL XREQ ANES: CPT | Performed by: PHYSICIAN ASSISTANT

## 2024-11-09 ASSESSMENT — ENCOUNTER SYMPTOMS
BLURRED VISION: 0
DOUBLE VISION: 0
DIZZINESS: 0
HEADACHES: 0

## 2024-11-09 ASSESSMENT — FIBROSIS 4 INDEX: FIB4 SCORE: 3.15

## 2024-11-09 NOTE — PROGRESS NOTES
"  Subjective:   Delphine Reeves is a 89 y.o. female who presents today with   Chief Complaint   Patient presents with    Suture / Staple Removal     On right right eyebrow and cheekbone      HPI  Patient seen for facial lac on 11/2 in ER.  States she is feeling better from the initial ER visit.  Laceration has been healing well.    PMH:  has a past medical history of GERD (gastroesophageal reflux disease), Hyperparathyroidism (HCC), Hypertension, Other neutropenia (HCC) (6/10/2020), and Thrombocytopenia (HCC) (10/20/2020).  MEDS:   Current Outpatient Medications:     atorvastatin (LIPITOR) 40 MG Tab, TAKE 1 TABLET BY MOUTH AT  BEDTIME, Disp: 90 Tablet, Rfl: 3    metoprolol SR (TOPROL XL) 25 MG TABLET SR 24 HR, TAKE 1 TABLET BY MOUTH ONCE  DAILY, Disp: 90 Tablet, Rfl: 3    amLODIPine (NORVASC) 5 MG Tab, TAKE 1 TABLET BY MOUTH DAILY, Disp: 90 Tablet, Rfl: 3    clopidogrel (PLAVIX) 75 MG Tab, TAKE 1 TABLET BY MOUTH DAILY, Disp: 90 Tablet, Rfl: 3    pantoprazole (PROTONIX) 20 MG tablet, TAKE 1 TABLET BY MOUTH DAILY, Disp: 90 Tablet, Rfl: 3    ferrous sulfate 325 (65 Fe) MG tablet, Take 1 Tablet by mouth every day., Disp: 90 Tablet, Rfl: 1    Cinacalcet HCl 60 MG Tab, Take 1 Tablet by mouth every day., Disp: , Rfl:   ALLERGIES: No Known Allergies  SURGHX:   Past Surgical History:   Procedure Laterality Date    KNEE REPLACEMENT, TOTAL Left      SOCHX:  reports that she has never smoked. She has never used smokeless tobacco. She reports that she does not drink alcohol and does not use drugs.  FH: Reviewed with patient, not pertinent to this visit.       Review of Systems   Eyes:  Negative for blurred vision and double vision.   Skin:         Sutures, healing laceration of face   Neurological:  Negative for dizziness and headaches.        Objective:   /84   Pulse (!) 55   Temp 35.9 °C (96.6 °F) (Temporal)   Resp 16   Ht 1.499 m (4' 11\")   Wt 54 kg (119 lb 1.6 oz)   SpO2 99%   BMI 24.06 kg/m²   Physical " Exam  Vitals and nursing note reviewed.   Constitutional:       General: She is not in acute distress.     Appearance: Normal appearance. She is well-developed. She is not ill-appearing or toxic-appearing.   HENT:      Head: Normocephalic and atraumatic.      Right Ear: Hearing normal.      Left Ear: Hearing normal.   Cardiovascular:      Rate and Rhythm: Normal rate.   Pulmonary:      Effort: Pulmonary effort is normal.   Musculoskeletal:      Comments: Normal movement in all 4 extremities.  Cast on left arm.  Ambulating with walker at baseline.   Skin:     General: Skin is warm and dry.             Comments: Sutures intact above the right eyebrow and below the right eye with no surrounding erythema and no drainage or discharge.  Scabbing to the area.   Neurological:      Mental Status: She is alert.      Coordination: Coordination normal.   Psychiatric:         Mood and Affect: Mood normal.           Assessment/Plan:   Assessment    1. Visit for suture removal      Patient tolerated suture removal today.  No dehiscence of wound site.  Slight gapping of the laceration above the right eyebrow but was able to have a good wound approximation with Steri-Strips.  Recommend monitoring for any new signs of infection.  Patient and family member are agreeable with plan.  Patient tolerated well.  Wound care instructions discussed.    Please note that this dictation was created using voice recognition software. I have made every reasonable attempt to correct obvious errors, but I expect that there are errors of grammar and possibly content that I did not discover before finalizing the note.    Asael Menon PA-C

## 2024-11-16 ENCOUNTER — HOSPITAL ENCOUNTER (OUTPATIENT)
Dept: LAB | Facility: MEDICAL CENTER | Age: 89
End: 2024-11-16
Attending: NURSE PRACTITIONER
Payer: MEDICARE

## 2024-11-16 DIAGNOSIS — E83.51 HYPOCALCEMIA: ICD-10-CM

## 2024-11-16 DIAGNOSIS — D50.8 OTHER IRON DEFICIENCY ANEMIAS: ICD-10-CM

## 2024-11-16 LAB
ALBUMIN SERPL BCP-MCNC: 4.2 G/DL (ref 3.2–4.9)
ALBUMIN/GLOB SERPL: 1.6 G/DL
ALP SERPL-CCNC: 106 U/L (ref 30–99)
ALT SERPL-CCNC: 10 U/L (ref 2–50)
ANION GAP SERPL CALC-SCNC: 14 MMOL/L (ref 7–16)
AST SERPL-CCNC: 20 U/L (ref 12–45)
BILIRUB SERPL-MCNC: 0.5 MG/DL (ref 0.1–1.5)
BUN SERPL-MCNC: 25 MG/DL (ref 8–22)
CALCIUM ALBUM COR SERPL-MCNC: 8.6 MG/DL (ref 8.5–10.5)
CALCIUM SERPL-MCNC: 8.8 MG/DL (ref 8.5–10.5)
CHLORIDE SERPL-SCNC: 108 MMOL/L (ref 96–112)
CO2 SERPL-SCNC: 21 MMOL/L (ref 20–33)
CREAT SERPL-MCNC: 1.46 MG/DL (ref 0.5–1.4)
ERYTHROCYTE [DISTWIDTH] IN BLOOD BY AUTOMATED COUNT: 49.1 FL (ref 35.9–50)
FERRITIN SERPL-MCNC: 30.5 NG/ML (ref 10–291)
GFR SERPLBLD CREATININE-BSD FMLA CKD-EPI: 33 ML/MIN/1.73 M 2
GLOBULIN SER CALC-MCNC: 2.6 G/DL (ref 1.9–3.5)
GLUCOSE SERPL-MCNC: 100 MG/DL (ref 65–99)
HCT VFR BLD AUTO: 35.2 % (ref 37–47)
HGB BLD-MCNC: 10.3 G/DL (ref 12–16)
IRON SATN MFR SERPL: 13 % (ref 15–55)
IRON SERPL-MCNC: 38 UG/DL (ref 40–170)
MCH RBC QN AUTO: 25.6 PG (ref 27–33)
MCHC RBC AUTO-ENTMCNC: 29.3 G/DL (ref 32.2–35.5)
MCV RBC AUTO: 87.3 FL (ref 81.4–97.8)
PLATELET # BLD AUTO: 163 K/UL (ref 164–446)
PMV BLD AUTO: 11.8 FL (ref 9–12.9)
POTASSIUM SERPL-SCNC: 4.3 MMOL/L (ref 3.6–5.5)
PROT SERPL-MCNC: 6.8 G/DL (ref 6–8.2)
PTH-INTACT SERPL-MCNC: 98.4 PG/ML (ref 14–72)
RBC # BLD AUTO: 4.03 M/UL (ref 4.2–5.4)
SODIUM SERPL-SCNC: 143 MMOL/L (ref 135–145)
TIBC SERPL-MCNC: 291 UG/DL (ref 250–450)
UIBC SERPL-MCNC: 253 UG/DL (ref 110–370)
WBC # BLD AUTO: 4.8 K/UL (ref 4.8–10.8)

## 2024-11-16 PROCEDURE — 80053 COMPREHEN METABOLIC PANEL: CPT

## 2024-11-16 PROCEDURE — 36415 COLL VENOUS BLD VENIPUNCTURE: CPT

## 2024-11-16 PROCEDURE — 83970 ASSAY OF PARATHORMONE: CPT

## 2024-11-16 PROCEDURE — 82728 ASSAY OF FERRITIN: CPT

## 2024-11-16 PROCEDURE — 83540 ASSAY OF IRON: CPT

## 2024-11-16 PROCEDURE — 83550 IRON BINDING TEST: CPT

## 2024-11-16 PROCEDURE — 85027 COMPLETE CBC AUTOMATED: CPT

## 2024-12-03 ENCOUNTER — OFFICE VISIT (OUTPATIENT)
Dept: MEDICAL GROUP | Facility: PHYSICIAN GROUP | Age: 89
End: 2024-12-03
Payer: MEDICARE

## 2024-12-03 VITALS
BODY MASS INDEX: 22.98 KG/M2 | WEIGHT: 117.06 LBS | HEIGHT: 60 IN | SYSTOLIC BLOOD PRESSURE: 132 MMHG | TEMPERATURE: 97.6 F | OXYGEN SATURATION: 98 % | DIASTOLIC BLOOD PRESSURE: 50 MMHG | HEART RATE: 56 BPM

## 2024-12-03 DIAGNOSIS — S62.232A: ICD-10-CM

## 2024-12-03 DIAGNOSIS — N18.32 STAGE 3B CHRONIC KIDNEY DISEASE: ICD-10-CM

## 2024-12-03 DIAGNOSIS — I35.8 AORTIC VALVE SCLEROSIS: ICD-10-CM

## 2024-12-03 DIAGNOSIS — D50.8 OTHER IRON DEFICIENCY ANEMIAS: ICD-10-CM

## 2024-12-03 DIAGNOSIS — S01.81XA LACERATION OF SKIN OF FOREHEAD, INITIAL ENCOUNTER: ICD-10-CM

## 2024-12-03 DIAGNOSIS — I10 ESSENTIAL HYPERTENSION, BENIGN: ICD-10-CM

## 2024-12-03 DIAGNOSIS — E21.3 HYPERPARATHYROIDISM (HCC): ICD-10-CM

## 2024-12-03 DIAGNOSIS — Z71.2 ENCOUNTER TO DISCUSS TEST RESULTS: ICD-10-CM

## 2024-12-03 PROCEDURE — 3078F DIAST BP <80 MM HG: CPT | Performed by: NURSE PRACTITIONER

## 2024-12-03 PROCEDURE — 3075F SYST BP GE 130 - 139MM HG: CPT | Performed by: NURSE PRACTITIONER

## 2024-12-03 PROCEDURE — 99214 OFFICE O/P EST MOD 30 MIN: CPT | Performed by: NURSE PRACTITIONER

## 2024-12-03 ASSESSMENT — FIBROSIS 4 INDEX: FIB4 SCORE: 3.65

## 2024-12-03 NOTE — ASSESSMENT & PLAN NOTE
BP today 162/70.  Repeat /50. Continues metoprolol SR 25 mg daily and amlodipine 5 mg daily. Home -140. No chest pain, shortness of breath or dizziness.

## 2024-12-03 NOTE — ASSESSMENT & PLAN NOTE
Cardiology had ordered echocardiogram. She has not completed. Encouraged to complete. Order printed and phone number provided to schedule.

## 2024-12-03 NOTE — ASSESSMENT & PLAN NOTE
Followed by Dr. Hernandez. She will call today to schedule an appointment today since her last appointment was cancelled. Recent kidney function stable.

## 2024-12-03 NOTE — ASSESSMENT & PLAN NOTE
Continues ferrous sulfate 325 mg daily. No constipation. Recent labs continue to show anemia and iron deficiency. 10/2024 iron levels were better. She does see nephrology. Continue ferrous sulfate.

## 2024-12-03 NOTE — PROGRESS NOTES
Subjective:     CC: Hypertension follow-up    HPI:   Delphine presents today with the following:    Stage 3b chronic kidney disease ([facility])  Followed by Dr. Hernandez. She will call today to schedule an appointment today since her last appointment was cancelled. Recent kidney function stable.     Essential hypertension, benign  BP today 162/70.  Repeat /50. Continues metoprolol SR 25 mg daily and amlodipine 5 mg daily. Home -140. No chest pain, shortness of breath or dizziness.     Hyperparathyroidism (CMS-HCC) ([facility])  Followed by nephrology. Continues cinacalcet 60 mg daily. Recent PTH elevated.     Other iron deficiency anemias  Continues ferrous sulfate 325 mg daily. No constipation. Recent labs continue to show anemia and iron deficiency. 10/2024 iron levels were better. She does see nephrology. Continue ferrous sulfate.     Aortic valve sclerosis  Cardiology had ordered echocardiogram. She has not completed. Encouraged to complete. Order printed and phone number provided to schedule.     Fall   She had recent fall 1 month ago. She was coming out of a restaurant, tripped on the threshold of the door and fell forward landing on her face. She had 17 stitches placed to right forehead laceration. CT head negative for acute abnormalities. X-ray of left hand showed acute displaced and angulated fracture of first metacarpal bone. She was using her cane at the time of the fall. She had her sutures removed 11/9/2024 with steri strips placed. She has been keeping the area clean and trimming steri strips as able. No further falls. She is now using the 4 wheel walker. She has been following with [facility] for thumb metacarpal fracture. She is now in brace.     Past Medical History:   Diagnosis Date    GERD (gastroesophageal reflux disease)     Hyperparathyroidism (HCC)     Hypertension     Other neutropenia ([facility]) 6/10/2020    Thrombocytopenia ([facility]) 10/20/2020       Social History     Tobacco Use    Smoking status: Never     Smokeless tobacco: Never   Vaping Use    Vaping status: Never Used   Substance Use Topics    Alcohol use: No     Comment: holidays    Drug use: No       Current Outpatient Medications Ordered in Epic   Medication Sig Dispense Refill    atorvastatin (LIPITOR) 40 MG Tab TAKE 1 TABLET BY MOUTH AT  BEDTIME 90 Tablet 3    metoprolol SR (TOPROL XL) 25 MG TABLET SR 24 HR TAKE 1 TABLET BY MOUTH ONCE  DAILY 90 Tablet 3    amLODIPine (NORVASC) 5 MG Tab TAKE 1 TABLET BY MOUTH DAILY 90 Tablet 3    clopidogrel (PLAVIX) 75 MG Tab TAKE 1 TABLET BY MOUTH DAILY 90 Tablet 3    pantoprazole (PROTONIX) 20 MG tablet TAKE 1 TABLET BY MOUTH DAILY 90 Tablet 3    ferrous sulfate 325 (65 Fe) MG tablet Take 1 Tablet by mouth every day. 90 Tablet 1    Cinacalcet HCl 60 MG Tab Take 1 Tablet by mouth every day.       No current [facility]-ordered facility-administered medications on file.       Allergies:  Patient has no known allergies.    Health Maintenance: encouraged vaccines at the pharmacy      Objective:     Vital signs reviewed  Exam:  /50 (BP Location: Right arm, Patient Position: Sitting)   Pulse (!) 56   Temp 36.4 °C (97.6 °F) (Temporal)   Ht 1.524 m (5')   Wt 53.1 kg (117 lb 1 oz)   SpO2 98%   BMI 22.86 kg/m²  Body mass index is 22.86 kg/m².    Gen: Alert and oriented, No apparent distress.  Face:    right upper forehead with scab and steri strips in places  Lungs: Normal effort, CTA bilaterally, no wheezes, rhonchi, or rales  CV: Regular rate and rhythm with systolic murmur. No rubs or gallops.  Ext: Left wrist in brace. Able to move thumb, reports no pain.    Assessment & Plan:     89 y.o. female with the following -     1. Essential hypertension, benign  Chronic stable problem.  Blood pressure at goal.  Continue metoprolol SR 25 mg daily and amlodipine 5 mg daily.  Continue home BP monitoring.  Discussed would like goal to be less than 130/80.    2. Stage 3b chronic kidney disease  Chronic exacerbated problem.  She is  rescheduling with her nephrologist.  Avoid NSAIDs.  Renal dose medications.    3. Hyperparathyroidism (HCC)  Chronic exacerbated problem.  Continue follow-up with nephrology.  Continue Cinacalcet 60 mg daily.    4. Other iron deficiency anemias  Chronic exacerbated problem.  Continue follow-up with nephrology.  Continue ferrous sulfate 325 mg daily.    5. Aortic valve sclerosis  Chronic exacerbated problem.  Encouraged echocardiogram.  Contact information provided and order reprinted.    6. Closed traumatic minimally displaced fracture of base of first metacarpal bone of left hand, initial encounter  Acute uncomplicated problem.  New problem to examiner.  She will continue follow-up with DALILA.  Continue brace as needed.    7. Laceration of skin of forehead, initial encounter  Acute uncomplicated problem.  There is a large scab over the area with Steri-Strips in place.  Encouraged patient she can trim the Steri-Strips as they fall off.  Continue use of four-wheel walker with ambulation.    8. Encounter to discuss test results  Acute uncomplicated problem.  Discussed and reviewed lab results from 11/16/2024.        Return in about 7 months (around 7/3/2025) for annual wellness visit.    Please note that this dictation was created using voice recognition software. I have made every reasonable attempt to correct obvious errors, but I expect that there are errors of grammar and possibly content that I did not discover before finalizing the note.         pt presents with history of COPD, on albuterol and Breo ellipta inhaler at home  per ED provider with significant wheezing on auscultation  s/p solumedrol 125 mg and duonebs in ED, now without wheezing on exam    - c/w home inhalers  - c/w IV steroids for copd exacerbation, taper as tolerated pt presents with history of COPD, on albuterol and Breo ellipta inhaler at home  per ED provider with significant wheezing on auscultation  s/p solumedrol 125 mg and duonebs in ED, now without wheezing on exam    - c/w home inhalers  - c/w IV steroids for copd exacerbation, taper as tolerated  - f/u [facility]

## 2024-12-26 ENCOUNTER — OFFICE VISIT (OUTPATIENT)
Dept: URGENT CARE | Facility: PHYSICIAN GROUP | Age: 89
End: 2024-12-26
Payer: MEDICARE

## 2024-12-26 VITALS
TEMPERATURE: 97.4 F | BODY MASS INDEX: 25.08 KG/M2 | OXYGEN SATURATION: 95 % | SYSTOLIC BLOOD PRESSURE: 132 MMHG | HEIGHT: 58 IN | DIASTOLIC BLOOD PRESSURE: 64 MMHG | RESPIRATION RATE: 20 BRPM | HEART RATE: 67 BPM | WEIGHT: 119.5 LBS

## 2024-12-26 DIAGNOSIS — R50.9 FEVER, UNSPECIFIED FEVER CAUSE: ICD-10-CM

## 2024-12-26 DIAGNOSIS — J02.9 PHARYNGITIS, UNSPECIFIED ETIOLOGY: ICD-10-CM

## 2024-12-26 DIAGNOSIS — R05.1 ACUTE COUGH: ICD-10-CM

## 2024-12-26 DIAGNOSIS — B34.9 ACUTE VIRAL SYNDROME: ICD-10-CM

## 2024-12-26 LAB
FLUAV RNA SPEC QL NAA+PROBE: NEGATIVE
FLUBV RNA SPEC QL NAA+PROBE: NEGATIVE
RSV RNA SPEC QL NAA+PROBE: NEGATIVE
SARS-COV-2 RNA RESP QL NAA+PROBE: NEGATIVE

## 2024-12-26 PROCEDURE — 0241U POCT CEPHEID COV-2, FLU A/B, RSV - PCR: CPT

## 2024-12-26 PROCEDURE — 3078F DIAST BP <80 MM HG: CPT

## 2024-12-26 PROCEDURE — 99213 OFFICE O/P EST LOW 20 MIN: CPT

## 2024-12-26 PROCEDURE — 3075F SYST BP GE 130 - 139MM HG: CPT

## 2024-12-26 ASSESSMENT — ENCOUNTER SYMPTOMS
PHOTOPHOBIA: 0
EYE PAIN: 0
CHILLS: 0
BLURRED VISION: 0
MYALGIAS: 0
SINUS PAIN: 0
EYE DISCHARGE: 0
EYE REDNESS: 1
HEADACHES: 0
BLOOD IN STOOL: 0
DOUBLE VISION: 0
WHEEZING: 0
PSYCHIATRIC NEGATIVE: 1
VOMITING: 0
DIARRHEA: 0
FEVER: 1
ABDOMINAL PAIN: 0
WEAKNESS: 0
DIZZINESS: 0
NAUSEA: 0
SORE THROAT: 1
SHORTNESS OF BREATH: 0
COUGH: 1
SPUTUM PRODUCTION: 0
DIAPHORESIS: 0

## 2024-12-26 ASSESSMENT — FIBROSIS 4 INDEX: FIB4 SCORE: 3.65

## 2024-12-26 ASSESSMENT — VISUAL ACUITY: OU: 1

## 2024-12-26 NOTE — PROGRESS NOTES
Subjective:   Delphine Reeves is a 89 y.o. female who presents for Sore Throat (Sore throat, cough, fever x 1 day) and Eye Problem (Daughter requesting to have her right eye looked at for redness)      HPI  Patient complains of sore throat, cough, fever since yesterday.  Patient states 10 days ago she noticed redness to right lower eye lid, patient has an appointment with an eye doctor 1/7/25. No pain, photophobia, drainage    No medications taken at home for symptoms.     Negative: shortness of breath, sputum production, wheezing, dyspnea, rhonchi, hypoxia, respiratory distress, chest pain, body aches, muffled voice, drooling, dizziness, fatigue, weakness, sleep disturbance, post nasal drip, sinus pressure, headaches, vision changes, abdominal pain, nausea, vomiting, diarrhea, recent travel       Review of Systems   Constitutional:  Positive for fever. Negative for chills, diaphoresis and malaise/fatigue.   HENT:  Positive for sore throat. Negative for congestion, ear pain and sinus pain.    Eyes:  Positive for redness. Negative for blurred vision, double vision, photophobia, pain and discharge.   Respiratory:  Positive for cough. Negative for sputum production, shortness of breath and wheezing.    Cardiovascular:  Negative for chest pain.   Gastrointestinal:  Negative for abdominal pain, blood in stool, diarrhea, nausea and vomiting.   Genitourinary: Negative.    Musculoskeletal:  Negative for myalgias.   Skin:  Negative for rash.   Neurological:  Negative for dizziness, weakness and headaches.   Endo/Heme/Allergies: Negative.    Psychiatric/Behavioral: Negative.     All other systems reviewed and are negative.      Medical History:  Past Medical History:   Diagnosis Date    GERD (gastroesophageal reflux disease)     Hyperparathyroidism (HCC)     Hypertension     Other neutropenia ([facility]) 6/10/2020    Thrombocytopenia ([facility]) 10/20/2020       Allergies:  No Known Allergies    Social history, surgical history,  "medications, and current problem list reviewed today in Epic.       Objective:     /64 (BP Location: Right arm, Patient Position: Sitting, BP Cuff Size: Small adult)   Pulse 67   Temp 36.3 °C (97.4 °F) (Temporal)   Resp 20   Ht 1.47 m (4' 9.87\")   Wt 54.2 kg (119 lb 8 oz)   SpO2 95%     Physical Exam  Vitals reviewed.   Constitutional:       General: She is not in acute distress.     Appearance: Normal appearance. She is not ill-appearing, toxic-appearing or diaphoretic.   HENT:      Head: Normocephalic.      Jaw: There is normal jaw occlusion.      Right Ear: Tympanic membrane, ear canal and external ear normal.      Left Ear: Tympanic membrane, ear canal and external ear normal.      Nose: Congestion and rhinorrhea present.      Right Sinus: No maxillary sinus tenderness or frontal sinus tenderness.      Left Sinus: No maxillary sinus tenderness or frontal sinus tenderness.      Mouth/Throat:      Lips: Pink.      Mouth: Mucous membranes are moist.      Tongue: Tongue does not deviate from midline.      Pharynx: Oropharynx is clear. Uvula midline. No oropharyngeal exudate, posterior oropharyngeal erythema or uvula swelling.   Eyes:      General: Vision grossly intact.         Right eye: No foreign body, discharge or hordeolum.         Left eye: No foreign body, discharge or hordeolum.      Extraocular Movements: Extraocular movements intact.      Conjunctiva/sclera: Conjunctivae normal.      Pupils: Pupils are equal, round, and reactive to light.        Comments: Redness to highlighted area   Cardiovascular:      Rate and Rhythm: Normal rate and regular rhythm.      Pulses: Normal pulses.      Heart sounds: Normal heart sounds.   Pulmonary:      Effort: Pulmonary effort is normal.      Breath sounds: Normal breath sounds.   Abdominal:      General: Abdomen is flat.      Palpations: Abdomen is soft.      Tenderness: There is no abdominal tenderness.   Musculoskeletal:         General: Normal range of " motion.      Cervical back: Normal range of motion and neck supple.   Lymphadenopathy:      Cervical: No cervical adenopathy.   Skin:     General: Skin is warm.      Capillary Refill: Capillary refill takes less than 2 seconds.   Neurological:      General: No focal deficit present.      Mental Status: She is alert and oriented to person, place, and time.   Psychiatric:         Mood and Affect: Mood normal.         Behavior: Behavior normal.         Assessment/Plan:       Diagnosis and associated orders:     1. Acute viral syndrome    2. Pharyngitis, unspecified etiology  - POCT CEPHEID COV-2, FLU A/B, RSV - PCR    3. Acute cough  - POCT CEPHEID COV-2, FLU A/B, RSV - PCR    4. Fever, unspecified fever cause  - POCT CEPHEID COV-2, FLU A/B, RSV - PCR     Comments/MDM:   I personally reviewed prior external notes and test results pertinent to today's visit as well as additional imaging and testing completed in clinic today.     Very pleasant 89-year-old afebrile, hemodynamically stable, generally well-appearing female, presenting with complaints of sore throat, cough, fever, red eye right side.  Normal pulmonary exam, no concern for pneumonia or bronchitis.  Normal ENT exam outside of nasal congestion and rhinorrhea, no concern for acute otitis media, bacterial conjunctivitis.  Strep pharyngitis, or bacterial sinus infection.  Patient most likely suffering from acute viral syndrome.  As far as patient's right eye no concern for any red flag warnings or bacterial infection.  Patient encouraged to follow-up with her scheduled eye appointment next year.  Patient agreeable to in clinic viral testing.  In clinic viral testing was negative   Patient encouraged to increase fluids and rest, cool-mist humidifier, saline nasal rinse with distilled water, cough/throat drops, salt water gargles, tylenol or ibuprofen for fever and/or bodyaches.  At this time I do not believe antibiotics would improve patient's symptoms.  Patient  encouraged to follow-up with the clinic in 48 hours for re-evaluation as needed.  Patient given strict instructions to follow up with the emergency room if they develop worsening symptoms.  Patient verbalized understanding.      Patient is clinically stable at today's acute urgent care visit. Vital signs are normal and reassuring.  No acute distress noted. Appropriate for outpatient management at this time. No red flag warnings noted.  Patient given strict instructions to follow up with emergency room if they develop any red flag warnings which were discussed in depth.  They verbalized understanding.      Differential diagnosis, natural history, supportive care, and indications for immediate follow-up discussed. All questions answered. Patient agrees with the plan of care. Advised the patient to follow-up with the primary care physician for recheck, reevaluation, and consideration of further management or the emergency room for worsening symptoms.      Please note that this dictation was created using voice recognition software. I have made every reasonable attempt to correct obvious errors, but I expect that there are errors of grammar and possibly content that I did not discover before finalizing the note.

## 2024-12-26 NOTE — PATIENT INSTRUCTIONS
Education:  -A cough can persist for up to 6 weeks.  -Increase fluids and rest.  -Cool-mist humidifier for nighttime use.   -Practice good hand hygiene.  -Zinc 25 mg has been shown to be effective in reducing the duration of cold symptoms.   -You may use either acetaminophen or ibuprofen over-the-counter every 6-8 hours for pain or fever if that is not contraindicated with your body.    -Saline Nasal Spray and Flonase may be used for congestion. Nasal saline in the nose helps to help break up nasal secretions, and is beneficial for nasal symptoms and throat pain.  -Saline Nasal Rinse with a Neti pot or William med rinse can be used multiple times a day. Be sure to use distilled water or boil tap water for 10 mins. Add a saline packet. Be sure the water is not too hot (around your body temp of 98 degrees)  -Decongestants are not recommended as they can have a rebound congestion effect along with many side effects (especially if you have high blood pressure).   -Chloraseptic lozenge, warm tea with honey or  throat spray to help with discomfort.  -Salt water gargles for sore throat several times a day.

## 2025-02-14 ENCOUNTER — APPOINTMENT (OUTPATIENT)
Dept: CARDIOLOGY | Facility: MEDICAL CENTER | Age: OVER 89
End: 2025-02-14
Attending: INTERNAL MEDICINE
Payer: MEDICARE

## 2025-02-18 ENCOUNTER — HOSPITAL ENCOUNTER (OUTPATIENT)
Dept: CARDIOLOGY | Facility: MEDICAL CENTER | Age: OVER 89
End: 2025-02-18
Attending: INTERNAL MEDICINE
Payer: MEDICARE

## 2025-02-18 DIAGNOSIS — I35.8 AORTIC VALVE SCLEROSIS: ICD-10-CM

## 2025-02-18 DIAGNOSIS — I25.10 CORONARY ARTERY DISEASE INVOLVING NATIVE CORONARY ARTERY OF NATIVE HEART WITHOUT ANGINA PECTORIS: ICD-10-CM

## 2025-02-18 PROCEDURE — 700117 HCHG RX CONTRAST REV CODE 255: Performed by: INTERNAL MEDICINE

## 2025-02-18 PROCEDURE — 93306 TTE W/DOPPLER COMPLETE: CPT

## 2025-02-18 RX ADMIN — HUMAN ALBUMIN MICROSPHERES AND PERFLUTREN 3 ML: 10; .22 INJECTION, SOLUTION INTRAVENOUS at 15:39

## 2025-02-26 ENCOUNTER — RESULTS FOLLOW-UP (OUTPATIENT)
Dept: CARDIOLOGY | Facility: MEDICAL CENTER | Age: OVER 89
End: 2025-02-26

## 2025-02-26 LAB — LV EJECT FRACT MOD 4C 99902: 44.64

## 2025-02-27 ENCOUNTER — OFFICE VISIT (OUTPATIENT)
Dept: URGENT CARE | Facility: PHYSICIAN GROUP | Age: OVER 89
End: 2025-02-27
Payer: MEDICARE

## 2025-02-27 VITALS
TEMPERATURE: 95.6 F | OXYGEN SATURATION: 97 % | SYSTOLIC BLOOD PRESSURE: 140 MMHG | DIASTOLIC BLOOD PRESSURE: 58 MMHG | RESPIRATION RATE: 14 BRPM | HEIGHT: 60 IN | WEIGHT: 117 LBS | BODY MASS INDEX: 22.97 KG/M2 | HEART RATE: 58 BPM

## 2025-02-27 DIAGNOSIS — L02.416 ABSCESS OF LEFT LOWER EXTREMITY: ICD-10-CM

## 2025-02-27 DIAGNOSIS — T14.8XXA HEMATOMA: ICD-10-CM

## 2025-02-27 RX ORDER — SULFAMETHOXAZOLE AND TRIMETHOPRIM 800; 160 MG/1; MG/1
1 TABLET ORAL 2 TIMES DAILY
Qty: 20 TABLET | Refills: 0 | Status: SHIPPED | OUTPATIENT
Start: 2025-02-27 | End: 2025-03-09

## 2025-02-27 ASSESSMENT — FIBROSIS 4 INDEX: FIB4 SCORE: 3.65

## 2025-02-28 NOTE — PROGRESS NOTES
Verbal consent was acquired by the patient to use [employer] ambient listening note generation during this visit          Chief Complaint   Patient presents with    Bump     Bump inner left leg next to ankle area, swollen, x1 week.          History of Present Illness  The patient is a 89-year-old female who presents for evaluation of an abscess.    She reports the presence of an abscess on her heel, which she first noticed approximately one week ago. She is not experiencing any pain associated with the abscess. She recalls an incident where she was awakened by a pinching sensation in her heel while in bed, leading her to speculate about a possible spider bite. She is not experiencing any systemic symptoms such as fever or chills. The accompanying adult male notes visible swelling in the area, raising concerns about a potential underlying infection.         ROS:    No severe shortness of breath   No cardiac like chest pain, as discussed   As otherwise stated in HPI    Medical/SX/ Social History:  Reviewed per chart    Pertinent Medications:    Current Outpatient Medications on File Prior to Visit   Medication Sig Dispense Refill    atorvastatin (LIPITOR) 40 MG Tab TAKE 1 TABLET BY MOUTH AT  BEDTIME 90 Tablet 3    metoprolol SR (TOPROL XL) 25 MG TABLET SR 24 HR TAKE 1 TABLET BY MOUTH ONCE  DAILY 90 Tablet 3    amLODIPine (NORVASC) 5 MG Tab TAKE 1 TABLET BY MOUTH DAILY 90 Tablet 3    clopidogrel (PLAVIX) 75 MG Tab TAKE 1 TABLET BY MOUTH DAILY 90 Tablet 3    pantoprazole (PROTONIX) 20 MG tablet TAKE 1 TABLET BY MOUTH DAILY 90 Tablet 3    ferrous sulfate 325 (65 Fe) MG tablet Take 1 Tablet by mouth every day. 90 Tablet 1    Cinacalcet HCl 60 MG Tab Take 1 Tablet by mouth every day.       No current facility-administered medications on file prior to visit.        Allergies:    Patient has no known allergies.     Problem list, medications, and allergies reviewed by myself today in Epic     Physical Exam:    Vitals:     02/27/25 1720   BP: (!) 140/58   Pulse: (!) 58   Resp: 14   Temp: (!) 35.3 °C (95.6 °F)   SpO2: 97%             Physical Exam  Vitals and nursing note reviewed.   Constitutional:       General: She is not in acute distress.     Appearance: Normal appearance. She is not ill-appearing or toxic-appearing.   HENT:      Head: Normocephalic and atraumatic.      Nose: Nose normal.      Mouth/Throat:      Mouth: Mucous membranes are moist.      Pharynx: Oropharynx is clear.   Eyes:      Extraocular Movements: Extraocular movements intact.      Conjunctiva/sclera: Conjunctivae normal.      Pupils: Pupils are equal, round, and reactive to light.   Cardiovascular:      Rate and Rhythm: Normal rate.      Pulses: Normal pulses.   Pulmonary:      Effort: Pulmonary effort is normal.   Musculoskeletal:         General: Normal range of motion.      Cervical back: Normal range of motion.      Left lower leg: Swelling and tenderness present.        Legs:       Comments: There is an appr 3-4 inch in diameter, raised, painful area that is fluctuant and warm.  The base is erythematous.  No induration and no drainage.  There is some bruising to her heel noted as well.     The area was anesthetized with 1% lidocaine without epi.  A #11 blade was used to make a small incision to the center of the fluctuant area.  Copious amounts of bloody drainage was expressed.  The wound was irrigated. No packing was felt to be necessary.  The area was bandaged with gauze, nonstick pads and coban.     Skin:     General: Skin is warm.      Capillary Refill: Capillary refill takes less than 2 seconds.   Neurological:      General: No focal deficit present.      Mental Status: She is alert and oriented to person, place, and time.          Medical Decision making and plan :  I personally reviewed prior external notes and test results pertinent to today's visit. Pt is clinically stable at today's acute urgent care visit.  Patient appears nontoxic with no acute  distress noted. Appropriate for outpatient care at this time.    Pleasant 89 y.o. female presented clinic with:     Assessment & Plan  1. Hematoma vs abscess to the LLE medial aspect.   The clinical presentation suggests an abscess, potentially due to an insect bite. There is no reported pain, fever, or chills. The abscess/hematoma was drained in clinic, see procedure in physical exam.  Patient was given prophylactic antibiotic in case this is an abscess vs hematoma.  She was educated on dressing changes and does have help at home to help provide this. She will return to clinic if she feels that she is developing wound infection or if she believes the wound is not healing.  She will go to ER if she develops fever or worsening redness or streaking, of which there are no signs of in clinic today.        Shared decision-making was utilized with patient for treatment plan. Medication discussed included indication for use and the potential benefits and side effects. Education was provided regarding the aforementioned assessments.  Differential Diagnosis, natural history, and supportive care discussed. All of the patient's questions were answered to their satisfaction at the time of discharge. Patient was encouraged to monitor symptoms closely. Those signs and symptoms which would warrant concern and mandate seeking a higher level of service through the emergency department discussed at length.  Patient stated agreement and understanding of this plan of care.    Disposition:  Home in stable condition     Voice Recognition Disclaimer:  Portions of this document were created using voice recognition software and [employer] technology provided by [employer]. The software does have a chance of producing errors of grammar and possibly content. I have made every reasonable attempt to correct obvious errors, but there may be errors of grammar and possibly content that I did not discover before finalizing the  documentation.    Razia WAYNE  RAQUEL Moulton

## 2025-03-01 ENCOUNTER — HOSPITAL ENCOUNTER (OUTPATIENT)
Dept: LAB | Facility: MEDICAL CENTER | Age: OVER 89
End: 2025-03-01
Attending: INTERNAL MEDICINE
Payer: MEDICARE

## 2025-03-01 LAB
PHOSPHATE SERPL-MCNC: 3.5 MG/DL (ref 2.5–4.5)
URATE SERPL-MCNC: 8.2 MG/DL (ref 1.9–8.2)

## 2025-03-01 PROCEDURE — 84550 ASSAY OF BLOOD/URIC ACID: CPT

## 2025-03-01 PROCEDURE — 36415 COLL VENOUS BLD VENIPUNCTURE: CPT

## 2025-03-01 PROCEDURE — 84100 ASSAY OF PHOSPHORUS: CPT

## 2025-03-23 DIAGNOSIS — K21.9 GASTROESOPHAGEAL REFLUX DISEASE WITHOUT ESOPHAGITIS: ICD-10-CM

## 2025-03-24 RX ORDER — PANTOPRAZOLE SODIUM 20 MG/1
20 TABLET, DELAYED RELEASE ORAL DAILY
Qty: 90 TABLET | Refills: 0 | Status: SHIPPED | OUTPATIENT
Start: 2025-03-24

## 2025-03-24 NOTE — TELEPHONE ENCOUNTER
Requested Prescriptions     Pending Prescriptions Disp Refills    pantoprazole (PROTONIX) 20 MG tablet [Pharmacy Med Name: Pantoprazole Sodium 20 MG Oral Tablet Delayed Release] 90 Tablet 0     Sig: TAKE 1 TABLET BY MOUTH DAILY       MYNOR Powell.

## 2025-04-08 NOTE — TELEPHONE ENCOUNTER
Phone Number Called: [phone]     Call outcome:  Pt did not answer and there is no option to leave VM    Set for follow-up tomorrow. Plan to contact emergency contact should she not be reached.

## 2025-04-08 NOTE — TELEPHONE ENCOUNTER
----- Message from Physician Riaz Loza M.D. sent at 4/8/2025  1:35 PM PDT -----    We should have her come back and discuss whether she would want something invasive like a MitraClip evaluation and what her symptom status is.  Please add her onto my next available or with an TAYLER if she would like to discuss sooner.  Thanks

## 2025-04-10 NOTE — TELEPHONE ENCOUNTER
----- Message from Physician Riaz Loza M.D. sent at 4/8/2025  1:35 PM PDT -----  We should have her come back and discuss whether she would want something invasive like a MitraClip evaluation and what her symptom status is.  Please add her onto my next available or with an TAYLER if she would like to discuss sooner.  Thanks  ----- Message -----  From: Nadia Monae R.N.  Sent: 2/26/2025   3:36 PM PDT  To: Riaz Loza M.D.    TW please advise if referral is recommended at this time. Thank you!    To structural as FYI. Thank you!

## 2025-04-10 NOTE — TELEPHONE ENCOUNTER
S/W Delphine regarding results of echo and TW recommendations. Delphine would like to come in to see TW to discuss best plan of care. Pt scheduled with TW 5/1 to discuss.

## 2025-04-26 ENCOUNTER — HOSPITAL ENCOUNTER (OUTPATIENT)
Dept: LAB | Facility: MEDICAL CENTER | Age: OVER 89
End: 2025-04-26
Attending: NURSE PRACTITIONER
Payer: MEDICARE

## 2025-04-26 LAB
25(OH)D3 SERPL-MCNC: 30 NG/ML (ref 30–100)
ACANTHOCYTES BLD QL SMEAR: NORMAL
ALBUMIN SERPL BCP-MCNC: 4 G/DL (ref 3.2–4.9)
ANISOCYTOSIS BLD QL SMEAR: ABNORMAL
APPEARANCE UR: ABNORMAL
BACTERIA #/AREA URNS HPF: ABNORMAL /HPF
BASOPHILS # BLD AUTO: 0.4 % (ref 0–1.8)
BASOPHILS # BLD: 0.02 K/UL (ref 0–0.12)
BILIRUB UR QL STRIP.AUTO: NEGATIVE
BUN SERPL-MCNC: 21 MG/DL (ref 8–22)
BURR CELLS BLD QL SMEAR: NORMAL
CALCIUM ALBUM COR SERPL-MCNC: 8.7 MG/DL (ref 8.5–10.5)
CALCIUM SERPL-MCNC: 8.7 MG/DL (ref 8.5–10.5)
CASTS URNS QL MICRO: ABNORMAL /LPF (ref 0–2)
CHLORIDE SERPL-SCNC: 110 MMOL/L (ref 96–112)
CO2 SERPL-SCNC: 22 MMOL/L (ref 20–33)
COLOR UR: YELLOW
COMMENT 1642: NORMAL
CREAT SERPL-MCNC: 1.38 MG/DL (ref 0.5–1.4)
CREAT UR-MCNC: 90.1 MG/DL
EOSINOPHIL # BLD AUTO: 0.01 K/UL (ref 0–0.51)
EOSINOPHIL NFR BLD: 0.2 % (ref 0–6.9)
EPITHELIAL CELLS 1715: ABNORMAL /HPF (ref 0–5)
ERYTHROCYTE [DISTWIDTH] IN BLOOD BY AUTOMATED COUNT: 53.7 FL (ref 35.9–50)
FERRITIN SERPL-MCNC: 25 NG/ML (ref 10–291)
GFR SERPLBLD CREATININE-BSD FMLA CKD-EPI: 35 ML/MIN/1.73 M 2
GLUCOSE SERPL-MCNC: 104 MG/DL (ref 65–99)
GLUCOSE UR STRIP.AUTO-MCNC: NEGATIVE MG/DL
HCT VFR BLD AUTO: 29.2 % (ref 37–47)
HGB BLD-MCNC: 8.3 G/DL (ref 12–16)
IMM GRANULOCYTES # BLD AUTO: 0.02 K/UL (ref 0–0.11)
IMM GRANULOCYTES NFR BLD AUTO: 0.4 % (ref 0–0.9)
IRON SATN MFR SERPL: 11 % (ref 15–55)
IRON SERPL-MCNC: 34 UG/DL (ref 40–170)
KETONES UR STRIP.AUTO-MCNC: NEGATIVE MG/DL
LEUKOCYTE ESTERASE UR QL STRIP.AUTO: ABNORMAL
LYMPHOCYTES # BLD AUTO: 1.29 K/UL (ref 1–4.8)
LYMPHOCYTES NFR BLD: 24.4 % (ref 22–41)
MCH RBC QN AUTO: 25.5 PG (ref 27–33)
MCHC RBC AUTO-ENTMCNC: 28.4 G/DL (ref 32.2–35.5)
MCV RBC AUTO: 89.8 FL (ref 81.4–97.8)
MICRO URNS: ABNORMAL
MICROCYTES BLD QL SMEAR: ABNORMAL
MONOCYTES # BLD AUTO: 0.96 K/UL (ref 0–0.85)
MONOCYTES NFR BLD AUTO: 18.2 % (ref 0–13.4)
MORPHOLOGY BLD-IMP: NORMAL
NEUTROPHILS # BLD AUTO: 2.98 K/UL (ref 1.82–7.42)
NEUTROPHILS NFR BLD: 56.4 % (ref 44–72)
NITRITE UR QL STRIP.AUTO: NEGATIVE
NRBC # BLD AUTO: 0 K/UL
NRBC BLD-RTO: 0 /100 WBC (ref 0–0.2)
OVALOCYTES BLD QL SMEAR: NORMAL
PH UR STRIP.AUTO: 7 [PH] (ref 5–8)
PHOSPHATE SERPL-MCNC: 3.5 MG/DL (ref 2.5–4.5)
PLATELET # BLD AUTO: 134 K/UL (ref 164–446)
PLATELET BLD QL SMEAR: NORMAL
PMV BLD AUTO: 13.1 FL (ref 9–12.9)
POIKILOCYTOSIS BLD QL SMEAR: NORMAL
POTASSIUM SERPL-SCNC: 5.1 MMOL/L (ref 3.6–5.5)
PROT UR QL STRIP: 30 MG/DL
PROT UR-MCNC: 27.3 MG/DL (ref 0–15)
PROT/CREAT UR: 303 MG/G (ref 10–107)
PTH-INTACT SERPL-MCNC: 130 PG/ML (ref 14–72)
RBC # BLD AUTO: 3.25 M/UL (ref 4.2–5.4)
RBC # URNS HPF: ABNORMAL /HPF (ref 0–2)
RBC BLD AUTO: PRESENT
RBC UR QL AUTO: NEGATIVE
SCHISTOCYTES BLD QL SMEAR: NORMAL
SODIUM SERPL-SCNC: 143 MMOL/L (ref 135–145)
SP GR UR STRIP.AUTO: 1.02
TIBC SERPL-MCNC: 314 UG/DL (ref 250–450)
UIBC SERPL-MCNC: 280 UG/DL (ref 110–370)
URATE SERPL-MCNC: 7.6 MG/DL (ref 1.9–8.2)
UROBILINOGEN UR STRIP.AUTO-MCNC: 0.2 EU/DL
WBC # BLD AUTO: 5.3 K/UL (ref 4.8–10.8)
WBC #/AREA URNS HPF: ABNORMAL /HPF

## 2025-04-26 PROCEDURE — 83550 IRON BINDING TEST: CPT

## 2025-04-26 PROCEDURE — 84156 ASSAY OF PROTEIN URINE: CPT

## 2025-04-26 PROCEDURE — 82306 VITAMIN D 25 HYDROXY: CPT

## 2025-04-26 PROCEDURE — 83540 ASSAY OF IRON: CPT

## 2025-04-26 PROCEDURE — 82728 ASSAY OF FERRITIN: CPT

## 2025-04-26 PROCEDURE — 81001 URINALYSIS AUTO W/SCOPE: CPT

## 2025-04-26 PROCEDURE — 82570 ASSAY OF URINE CREATININE: CPT

## 2025-04-26 PROCEDURE — 80069 RENAL FUNCTION PANEL: CPT

## 2025-04-26 PROCEDURE — 36415 COLL VENOUS BLD VENIPUNCTURE: CPT

## 2025-04-26 PROCEDURE — 83970 ASSAY OF PARATHORMONE: CPT

## 2025-04-26 PROCEDURE — 84550 ASSAY OF BLOOD/URIC ACID: CPT

## 2025-04-26 PROCEDURE — 85025 COMPLETE CBC W/AUTO DIFF WBC: CPT

## 2025-05-01 ENCOUNTER — OFFICE VISIT (OUTPATIENT)
Dept: CARDIOLOGY | Facility: MEDICAL CENTER | Age: OVER 89
End: 2025-05-01
Attending: INTERNAL MEDICINE
Payer: MEDICARE

## 2025-05-01 VITALS
HEART RATE: 56 BPM | HEIGHT: 60 IN | BODY MASS INDEX: 22.78 KG/M2 | OXYGEN SATURATION: 98 % | DIASTOLIC BLOOD PRESSURE: 44 MMHG | RESPIRATION RATE: 16 BRPM | SYSTOLIC BLOOD PRESSURE: 126 MMHG | WEIGHT: 116 LBS

## 2025-05-01 DIAGNOSIS — I25.10 CORONARY ARTERY DISEASE INVOLVING NATIVE CORONARY ARTERY OF NATIVE HEART WITHOUT ANGINA PECTORIS: ICD-10-CM

## 2025-05-01 DIAGNOSIS — I35.0 NONRHEUMATIC AORTIC VALVE STENOSIS: ICD-10-CM

## 2025-05-01 DIAGNOSIS — I10 ESSENTIAL HYPERTENSION: ICD-10-CM

## 2025-05-01 DIAGNOSIS — I34.0 NONRHEUMATIC MITRAL VALVE REGURGITATION: ICD-10-CM

## 2025-05-01 PROCEDURE — 3078F DIAST BP <80 MM HG: CPT | Performed by: INTERNAL MEDICINE

## 2025-05-01 PROCEDURE — 3074F SYST BP LT 130 MM HG: CPT | Performed by: INTERNAL MEDICINE

## 2025-05-01 PROCEDURE — 99213 OFFICE O/P EST LOW 20 MIN: CPT | Performed by: INTERNAL MEDICINE

## 2025-05-01 PROCEDURE — 99214 OFFICE O/P EST MOD 30 MIN: CPT | Performed by: INTERNAL MEDICINE

## 2025-05-01 ASSESSMENT — FIBROSIS 4 INDEX: FIB4 SCORE: 4.44

## 2025-05-01 NOTE — PROGRESS NOTES
Chief Complaint   Patient presents with    Coronary Artery Disease     Follow up        Subjective     Delphine Reeves is a 89 y.o. female who presents today for initial visit in follow-up of CAD characterized by CAD characterized by apical STEMI 8/2022 with unsuccessful revascularization, subsequent preserved LVEF and no angina.  Also has a history of dyslipidemia and hypertension.  Had a single episode of provoked atrial fibrillation surrounding her AMI that has not returned and is thus resolved.  Feels well has no cardiovascular complaints and appears younger than stated age.    In the interim underwent echocardiogram which revealed progression of her aortic sclerosis to moderate aortic stenosis and development of new moderately severe mitral regurgitation.  She remains completely asymptomatic despite ongoing iron deficiency anemia.  NYHA class I.    Past Medical History:   Diagnosis Date    GERD (gastroesophageal reflux disease)     Hyperparathyroidism (HCC)     Hypertension     Other neutropenia (HCC) 6/10/2020    Thrombocytopenia (HCC) 10/20/2020     Past Surgical History:   Procedure Laterality Date    KNEE REPLACEMENT, TOTAL Left      Family History   Problem Relation Age of Onset    Heart Disease Mother     Heart Disease Father     Lung Disease Sister     Heart Disease Brother     No Known Problems Brother     Cancer Neg Hx     Diabetes Neg Hx      Social History     Socioeconomic History    Marital status:      Spouse name: Not on file    Number of children: Not on file    Years of education: Not on file    Highest education level: Not on file   Occupational History    Not on file   Tobacco Use    Smoking status: Never     Passive exposure: Never    Smokeless tobacco: Never   Vaping Use    Vaping status: Never Used   Substance and Sexual Activity    Alcohol use: No     Comment: holidays    Drug use: No    Sexual activity: Not Currently     Comment:  x 71 yrs, 3 sons, [facility]     Other Topics Concern    Not on file   Social History Narrative    Not on file     Social Drivers of Health     Financial Resource Strain: Not on file   Food Insecurity: Not on file   Transportation Needs: Not on file   Physical Activity: Not on file   Stress: Not on file   Social Connections: Not on file   Intimate Partner Violence: Not on file   Housing Stability: Not on file     No Known Allergies  Outpatient Encounter Medications as of 5/1/2025   Medication Sig Dispense Refill    pantoprazole (PROTONIX) 20 MG tablet TAKE 1 TABLET BY MOUTH DAILY 90 Tablet 0    atorvastatin (LIPITOR) 40 MG Tab TAKE 1 TABLET BY MOUTH AT  BEDTIME 90 Tablet 3    metoprolol SR (TOPROL XL) 25 MG TABLET SR 24 HR TAKE 1 TABLET BY MOUTH ONCE  DAILY 90 Tablet 3    amLODIPine (NORVASC) 5 MG Tab TAKE 1 TABLET BY MOUTH DAILY 90 Tablet 3    clopidogrel (PLAVIX) 75 MG Tab TAKE 1 TABLET BY MOUTH DAILY 90 Tablet 3    ferrous sulfate 325 (65 Fe) MG tablet Take 1 Tablet by mouth every day. 90 Tablet 1    Cinacalcet HCl 60 MG Tab Take 1 Tablet by mouth every day.       No facility-administered encounter medications on file as of 5/1/2025.     Review of Systems   All other systems reviewed and are negative.             Objective     /44 (BP Location: Left arm, Patient Position: Sitting)   Pulse (!) 56   Resp 16   Ht 1.524 m (5')   Wt 52.6 kg (116 lb)   SpO2 98%   BMI 22.65 kg/m²     Physical Exam  Vitals and nursing note reviewed.   Constitutional:       General: She is not in acute distress.     Appearance: Normal appearance.      Comments: Appears younger than stated age   HENT:      Head: Normocephalic and atraumatic.      Right Ear: External ear normal.      Left Ear: External ear normal.      Nose: Nose normal.   Eyes:      Conjunctiva/sclera: Conjunctivae normal.   Cardiovascular:      Rate and Rhythm: Normal rate and regular rhythm.      Pulses: Normal pulses.      Heart sounds: Murmur heard.   Pulmonary:      Effort: Pulmonary  effort is normal. No respiratory distress.      Breath sounds: Normal breath sounds.   Abdominal:      General: There is no distension.      Palpations: Abdomen is soft.   Musculoskeletal:      Cervical back: No rigidity or tenderness.      Right lower leg: No edema.      Left lower leg: No edema.   Skin:     General: Skin is warm and dry.      Capillary Refill: Capillary refill takes 2 to 3 seconds.   Neurological:      General: No focal deficit present.      Mental Status: She is alert and oriented to person, place, and time.   Psychiatric:         Mood and Affect: Mood normal.         Behavior: Behavior normal.         Thought Content: Thought content normal.     LABS:  Lab Results   Component Value Date/Time    CHOLSTRLTOT 116 02/17/2024 10:36 AM    LDL 55 02/17/2024 10:36 AM    HDL 40 02/17/2024 10:36 AM    TRIGLYCERIDE 104 02/17/2024 10:36 AM       Lab Results   Component Value Date/Time    WBC 5.3 04/26/2025 10:02 AM    RBC 3.25 (L) 04/26/2025 10:02 AM    HEMOGLOBIN 8.3 (L) 04/26/2025 10:02 AM    HEMATOCRIT 29.2 (L) 04/26/2025 10:02 AM    MCV 89.8 04/26/2025 10:02 AM    NEUTSPOLYS 56.40 04/26/2025 10:02 AM    LYMPHOCYTES 24.40 04/26/2025 10:02 AM    MONOCYTES 18.20 (H) 04/26/2025 10:02 AM    EOSINOPHILS 0.20 04/26/2025 10:02 AM    BASOPHILS 0.40 04/26/2025 10:02 AM    HYPOCHROMIA 1+ 11/10/2022 12:13 PM    ANISOCYTOSIS 1+ 04/26/2025 10:02 AM     Lab Results   Component Value Date/Time    SODIUM 143 04/26/2025 10:02 AM    POTASSIUM 5.1 04/26/2025 10:02 AM    CHLORIDE 110 04/26/2025 10:02 AM    CO2 22 04/26/2025 10:02 AM    GLUCOSE 104 (H) 04/26/2025 10:02 AM    BUN 21 04/26/2025 10:02 AM    CREATININE 1.38 04/26/2025 10:02 AM           Lab Results   Component Value Date/Time    ALKPHOSPHAT 106 (H) 11/16/2024 08:26 AM    ASTSGOT 20 11/16/2024 08:26 AM    ALTSGPT 10 11/16/2024 08:26 AM    TBILIRUBIN 0.5 11/16/2024 08:26 AM      Lab Results   Component Value Date/Time    BNPBTYPENAT 20 01/04/2016 08:35 PM      No  "results found for: \"TSH\"  Lab Results   Component Value Date/Time    PROTHROMBTM 18.5 (H) 08/06/2022 10:11 PM    INR 1.57 (H) 08/06/2022 10:11 PM        Imaging reviewed           Assessment & Plan     1. Nonrheumatic mitral valve regurgitation  EC-ECHOCARDIOGRAM COMPLETE W/O CONT      2. Nonrheumatic aortic valve stenosis  EC-ECHOCARDIOGRAM COMPLETE W/O CONT      3. Essential hypertension        4. Coronary artery disease involving native coronary artery of native heart without angina pectoris            Medical Decision Making: Today's Assessment/Status/Plan:          Doing quite well.  Blood pressure is good.  Medical therapy is appropriate.  We discussed her new moderate to moderately severe valvular disease in detail today.  As she is completely asymptomatic and given her age she prefers interval follow-up which is very appropriate.  Discussed there are percutaneous options that may be available for her and she is unsure if she would be up for doing something so aggressive at her age however she is quite robust and would be open to further discussion should it become necessary which it is currently not.  Follow-up 6 months with an echocardiogram.          "

## 2025-05-30 DIAGNOSIS — I21.02 ST ELEVATION MYOCARDIAL INFARCTION INVOLVING LEFT ANTERIOR DESCENDING (LAD) CORONARY ARTERY (HCC): ICD-10-CM

## 2025-05-30 DIAGNOSIS — K21.9 GASTROESOPHAGEAL REFLUX DISEASE WITHOUT ESOPHAGITIS: ICD-10-CM

## 2025-06-02 RX ORDER — CLOPIDOGREL BISULFATE 75 MG/1
75 TABLET ORAL DAILY
Qty: 90 TABLET | Refills: 3 | Status: SHIPPED | OUTPATIENT
Start: 2025-06-02

## 2025-06-02 RX ORDER — PANTOPRAZOLE SODIUM 20 MG/1
20 TABLET, DELAYED RELEASE ORAL DAILY
Qty: 90 TABLET | Refills: 3 | Status: SHIPPED | OUTPATIENT
Start: 2025-06-02

## 2025-06-02 NOTE — TELEPHONE ENCOUNTER
Received request via: Pharmacy    Was the patient seen in the last year in this department? Yes    Does the patient have an active prescription (recently filled or refills available) for medication(s) requested? No    [facility]    Does the patient have longterm Plus and need 100-day supply? (This applies to ALL medications) Patient does not have SCP

## 2025-06-02 NOTE — TELEPHONE ENCOUNTER
Received request via: Pharmacy    Was the patient seen in the last year in this department? Yes    Does the patient have an active prescription (recently filled or refills available) for medication(s) requested? No    [facility]    Does the patient have [facility] Plus and need 100-day supply? (This applies to ALL medications) Patient does not have SCP

## 2025-06-25 DIAGNOSIS — I10 ESSENTIAL HYPERTENSION: ICD-10-CM

## 2025-06-26 RX ORDER — AMLODIPINE BESYLATE 5 MG/1
5 TABLET ORAL DAILY
Qty: 90 TABLET | Refills: 3 | Status: SHIPPED | OUTPATIENT
Start: 2025-06-26

## 2025-06-26 NOTE — TELEPHONE ENCOUNTER
Received request via: Pharmacy    Was the patient seen in the last year in this department? Yes    Does the patient have an active prescription (recently filled or refills available) for medication(s) requested? No    [facility]    Does the patient have [facility] Plus and need 100-day supply? (This applies to ALL medications) Patient does not have SCP

## 2025-07-09 DIAGNOSIS — I10 ESSENTIAL HYPERTENSION, BENIGN: ICD-10-CM

## 2025-07-09 DIAGNOSIS — I48.0 PAROXYSMAL A-FIB (HCC): ICD-10-CM

## 2025-07-09 DIAGNOSIS — I21.02 ST ELEVATION MYOCARDIAL INFARCTION INVOLVING LEFT ANTERIOR DESCENDING (LAD) CORONARY ARTERY (HCC): ICD-10-CM

## 2025-07-10 RX ORDER — ATORVASTATIN CALCIUM 40 MG/1
40 TABLET, FILM COATED ORAL
Qty: 90 TABLET | Refills: 3 | Status: SHIPPED | OUTPATIENT
Start: 2025-07-10

## 2025-07-10 RX ORDER — METOPROLOL SUCCINATE 25 MG/1
25 TABLET, EXTENDED RELEASE ORAL DAILY
Qty: 90 TABLET | Refills: 3 | Status: SHIPPED | OUTPATIENT
Start: 2025-07-10

## 2025-07-10 NOTE — TELEPHONE ENCOUNTER
Requested Prescriptions     Signed Prescriptions Disp Refills    atorvastatin (LIPITOR) 40 MG Tab 90 Tablet 3     Sig: TAKE 1 TABLET BY MOUTH AT  BEDTIME     Authorizing Provider: ALLISON SIMPSON    metoprolol SR (TOPROL XL) 25 MG TABLET SR 24 HR 90 Tablet 3     Sig: TAKE 1 TABLET BY MOUTH ONCE  DAILY     Authorizing Provider: ALLISON SIMPSON A.P.R.N.

## 2025-07-10 NOTE — TELEPHONE ENCOUNTER
Received request via: Pharmacy    Was the patient seen in the last year in this department? Yes    Does the patient have an active prescription (recently filled or refills available) for medication(s) requested? No    [facility]    Does the patient have alf Plus and need 100-day supply? (This applies to ALL medications) Patient does not have SCP

## 2025-07-14 ENCOUNTER — OFFICE VISIT (OUTPATIENT)
Dept: MEDICAL GROUP | Facility: PHYSICIAN GROUP | Age: OVER 89
End: 2025-07-14
Payer: MEDICARE

## 2025-07-14 VITALS
HEIGHT: 60 IN | HEART RATE: 52 BPM | TEMPERATURE: 98.7 F | DIASTOLIC BLOOD PRESSURE: 58 MMHG | SYSTOLIC BLOOD PRESSURE: 138 MMHG | WEIGHT: 114 LBS | BODY MASS INDEX: 22.38 KG/M2 | OXYGEN SATURATION: 98 %

## 2025-07-14 DIAGNOSIS — N18.32 STAGE 3B CHRONIC KIDNEY DISEASE: ICD-10-CM

## 2025-07-14 DIAGNOSIS — I25.10 CORONARY ARTERY DISEASE INVOLVING NATIVE CORONARY ARTERY OF NATIVE HEART WITHOUT ANGINA PECTORIS: ICD-10-CM

## 2025-07-14 DIAGNOSIS — E78.00 PURE HYPERCHOLESTEROLEMIA: ICD-10-CM

## 2025-07-14 DIAGNOSIS — K21.9 GASTROESOPHAGEAL REFLUX DISEASE WITHOUT ESOPHAGITIS: ICD-10-CM

## 2025-07-14 DIAGNOSIS — R19.7 INTERMITTENT DIARRHEA: ICD-10-CM

## 2025-07-14 DIAGNOSIS — E21.3 HYPERPARATHYROIDISM (HCC): ICD-10-CM

## 2025-07-14 DIAGNOSIS — Z00.00 MEDICARE ANNUAL WELLNESS VISIT, SUBSEQUENT: Primary | ICD-10-CM

## 2025-07-14 DIAGNOSIS — D50.8 OTHER IRON DEFICIENCY ANEMIAS: ICD-10-CM

## 2025-07-14 DIAGNOSIS — I10 ESSENTIAL HYPERTENSION, BENIGN: ICD-10-CM

## 2025-07-14 PROCEDURE — G0439 PPPS, SUBSEQ VISIT: HCPCS | Performed by: NURSE PRACTITIONER

## 2025-07-14 PROCEDURE — 3075F SYST BP GE 130 - 139MM HG: CPT | Performed by: NURSE PRACTITIONER

## 2025-07-14 PROCEDURE — 3078F DIAST BP <80 MM HG: CPT | Performed by: NURSE PRACTITIONER

## 2025-07-14 ASSESSMENT — ACTIVITIES OF DAILY LIVING (ADL): BATHING_REQUIRES_ASSISTANCE: 0

## 2025-07-14 ASSESSMENT — ENCOUNTER SYMPTOMS: GENERAL WELL-BEING: GOOD

## 2025-07-14 ASSESSMENT — PATIENT HEALTH QUESTIONNAIRE - PHQ9: CLINICAL INTERPRETATION OF PHQ2 SCORE: 0

## 2025-07-14 ASSESSMENT — FIBROSIS 4 INDEX: FIB4 SCORE: 4.48

## 2025-07-14 NOTE — ASSESSMENT & PLAN NOTE
Continues to follow-up with Dr. Hernandez's office.  Recent GFR stable at 35.  She does not take any NSAIDs.

## 2025-07-14 NOTE — ASSESSMENT & PLAN NOTE
BP today 138/58.  Continues metoprolol SR 25 mg daily and amlodipine 5 mg daily. Home BP <140/90. Denies chest pain, shortness of breath or dizziness.

## 2025-07-14 NOTE — ASSESSMENT & PLAN NOTE
Nephrology asking if she needs pantoprazole. No heart burn or indigestion. Plan to stop medication.

## 2025-07-14 NOTE — ASSESSMENT & PLAN NOTE
Chronic problem.  No aggravating factors.  She stopped milk products that did not help. Start food diary to identify triggers.

## 2025-07-14 NOTE — PROGRESS NOTES
Chief Complaint   Patient presents with    Annual Wellness Visit       HPI:  Delphine Reeves is a 89 y.o. here for Medicare Annual Wellness Visit     Gastroesophageal reflux disease without esophagitis  Nephrology asking if she needs pantoprazole. No heart burn or indigestion. Plan to stop medication.    Other iron deficiency anemias  Nephrology recommend daily dosing.  Continues ferrous sulfate 325 mg daily.    Pure hypercholesterolemia  Due for updated labs.  Continues atorvastatin 40 mg at bedtime.  No myalgias.    Essential hypertension, benign  BP today 138/58.  Continues metoprolol SR 25 mg daily and amlodipine 5 mg daily. Home BP <140/90. Denies chest pain, shortness of breath or dizziness.    Hyperparathyroidism (CMS-HCC) ([facility])  Follows with nephrology.  Continues Cinacalcet 60 mg daily.    Stage 3b chronic kidney disease ([facility])  Continues to follow-up with Dr. Hernandez's office.  Recent GFR stable at 35.  She does not take any NSAIDs.    Coronary artery disease involving native coronary artery of native heart without angina pectoris  Follows with cardiology.  Continues atorvastatin 40 mg at bedtime and clopidogrel 75 mg daily.    Intermittent diarrhea  Chronic problem.  No aggravating factors.  She stopped milk products that did not help. Start food diary to identify triggers.        Patient Active Problem List    Diagnosis Date Noted    Intermittent diarrhea 07/14/2025    Coronary artery disease involving native coronary artery of native heart without angina pectoris 07/02/2024    Aortic valve sclerosis 07/02/2024    Other iron deficiency anemias 09/08/2022    History of ST elevation myocardial infarction (STEMI) 08/06/2022    Difficulty sleeping 04/28/2021    Low serum vitamin D 11/14/2017    Stage 3b chronic kidney disease 09/22/2017    Hyperparathyroidism (HCC) 03/24/2017    Abnormal chest CT 02/21/2017    Pure hypercholesterolemia 09/12/2016    Essential hypertension, benign 02/10/2016     Gastroesophageal reflux disease without esophagitis 02/10/2016       Current Medications[1]       Current supplements as per medication list.     Allergies: Patient has no known allergies.    Current social contact/activities: just friends, goes out to lunch with them or they bring her lunch      She  reports that she has never smoked. She has never been exposed to tobacco smoke. She has never used smokeless tobacco. She reports that she does not drink alcohol and does not use drugs.  Counseling given: No      ROS:    Gait: Uses a walker and cane   Ostomy: No  Other tubes: No  Amputations: No  Chronic oxygen use: No  Last eye exam: 12/2024; continues annual eye exam with Dr. Sher   Wears hearing aids: No   : Denies any urinary leakage during the last 6 months    Screening:  Discussed and reviewed   Depression Screening  Little interest or pleasure in doing things?  0 - not at all  Feeling down, depressed , or hopeless? 0 - not at all  Patient Health Questionnaire Score: 0     If depressive symptoms identified deferred to follow up visit unless specifically addressed in assessment and plan.    Interpretation of PHQ-9 Total Score   Score Severity   1-4 No Depression   5-9 Mild Depression   10-14 Moderate Depression   15-19 Moderately Severe Depression   20-27 Severe Depression    Screening for Cognitive Impairment  Do you or any of your friends or family members have any concern about your memory?  No concerns from family and self  Three Minute Recall (Village, Kitchen, Baby) 2/3    Ricco clock face with all 12 numbers and set the hands to show 10 minutes past 11.  No Pt set time to 10:55 and added number 6 twice on clock   Cognitive concerns identified deferred for follow up unless specifically addressed in assessment and plan.    Fall Risk Assessment  Has the patient had two or more falls in the last year or any fall with injury in the last year?  No, not since 11/2024    Safety Assessment  Do you always wear  your seatbelt?  Yes  Any changes to home needed to function safely? No  Difficulty hearing.  No  Patient counseled about all safety risks that were identified.    Functional Assessment ADLs  Are there any barriers preventing you from cooking for yourself or meeting nutritional needs?  No. Has meal on wheels and she cooks her own food   Are there any barriers preventing you from driving safely or obtaining transportation?  No. Friends or neighbors drive her   Are there any barriers preventing you from using a telephone or calling for help?  No    Are there any barriers preventing you from shopping?  No.    Are there any barriers preventing you from taking care of your own finances?  No    Are there any barriers preventing you from managing your medications?  No    Are there any barriers preventing you from showering, bathing or dressing yourself? No    Are there any barriers preventing you from doing housework or laundry? No  Are there any barriers preventing you from using the toilet?No  Are you currently engaging in any exercise or physical activity?  Yes. Walking in place and arm exercises     Self-Assessment of Health  What is your perception of your health? Good    Do you sleep more than six hours a night? No Wakes up couple times to go to bathroom  She is able to fall back asleep. She drinks water up until bedtime, none at night.   In the past 7 days, how much did pain keep you from doing your normal work? None    Do you spend quality time with family or friends (virtually or in person)? Yes    Do you usually eat a heart healthy diet that constists of a variety of fruits, vegetables, whole grains and fiber? Yes    Do you eat foods high in fat and/or Fast Food more than three times per week? No    How concerned are you that your medical conditions are not being well managed? Not at all    Are you worried that in the next 2 months, you may not have stable housing that you own, rent, or stay in as part of a  household? No      Advance Care Planning  Do you have an Advance Directive, Living Will, Durable Power of , or POLST? Yes  Advance Directive       is on file      Health Maintenance Summary            Current Care Gaps       Zoster (Shingles) Vaccines (3 of 3) Overdue since 7/30/2021 06/04/2021  Imm Admin: Zoster Vaccine Recombinant (RZV) (SHINGRIX)    02/10/2015  Imm Admin: Zoster Vaccine Live (ZVL) (Zostavax) - HISTORICAL DATA              COVID-19 Vaccine (4 - 2024-25 season) Overdue since 9/1/2024      10/15/2021  Imm Admin: PFIZER PURPLE CAP SARS-COV-2 VACCINATION (12+)    03/04/2021  Imm Admin: PFIZER PURPLE CAP SARS-COV-2 VACCINATION (12+)    02/11/2021  Imm Admin: PFIZER PURPLE CAP SARS-COV-2 VACCINATION (12+)              Annual Wellness Visit (Yearly) Overdue since 7/2/2025 07/02/2024  Visit Dx: Medicare annual wellness visit, subsequent    07/02/2024  Level of Service: MO ANNUAL WELLNESS VISIT-INCLUDES PPPS SUBSEQUE*    06/27/2023  Visit Dx: Medicare annual wellness visit, initial    06/27/2023  Level of Service: MO INITIAL ANNUAL WELLNESS VISIT-INCLUDES PPPS                      Upcoming       Influenza Vaccine (1) Next due on 9/1/2025      10/01/2024  Imm Admin: Influenza Vaccine Adult HD    10/03/2022  Imm Admin: Influenza Vaccine Adult HD    09/29/2021  Imm Admin: Influenza Vaccine Adult HD    10/20/2020  Imm Admin: Influenza Vaccine Adult HD    11/09/2019  Imm Admin: Influenza Vaccine Adult HD     Only the first 5 history entries have been loaded, but more history exists.            IMM DTaP/Tdap/Td Vaccine (2 - Td or Tdap) Next due on 8/19/2029 08/19/2019  Imm Admin: Tdap Vaccine                      Completed or No Longer Recommended       Pneumococcal Vaccine: 50+ Years (Series Information) Completed      09/22/2017  Imm Admin: Pneumococcal polysaccharide vaccine (PPSV-23)    10/16/2016  Imm Admin: Pneumococcal Conjugate Vaccine (Prevnar/PCV-13)    11/05/2015  Imm Admin:  Pneumococcal Conjugate Vaccine (Prevnar/PCV-13)              Hepatitis A Vaccine (Hep A) (Series Information) Aged Out      No completion history exists for this topic.              Hepatitis B Vaccine (Hep B) (Series Information) Aged Out     No completion history exists for this topic.              HPV Vaccines (Series Information) Aged Out     No completion history exists for this topic.              Polio Vaccine (Inactivated Polio) (Series Information) Aged Out     No completion history exists for this topic.              Meningococcal Immunization (Series Information) Aged Out     No completion history exists for this topic.              Meningococcal B Vaccine (Series Information) Aged Out     No completion history exists for this topic.              Mammogram  Discontinued        Frequency changed to Never automatically (Topic No Longer Applies)    05/15/2017  [id number]              Bone Density Scan  Discontinued      11/15/2016  DS-BONE DENSITY STUDY (DEXA)              Cervical Cancer Screening  Discontinued        Frequency changed to Never automatically (Topic No Longer Applies)    04/10/2018  THINPREP PAP, REFLEX HPV ON ASC-US AND ABOVE              Colorectal Cancer Screening  Discontinued        Frequency changed to Never automatically (Topic No Longer Applies)    08/07/2022  Occult Blood Feces component of OCCULT BLOOD STOOL                            Patient Care Team:  RAQUEL Vizcaino as PCP - General (Nurse Practitioner)  Francesco Hernandez M.D. as Consulting Physician (Nephrology)  Ag Cormier (Dentistry)  Hao Mendoza M.D. (Cardiovascular Disease (Cardiology))  Rosy Sher (Optometry)        Social History[2]  Family History   Problem Relation Age of Onset    Heart Disease Mother     Heart Disease Father     Lung Disease Sister     Heart Disease Brother     No Known Problems Brother     Cancer Neg Hx     Diabetes Neg Hx      She  has a past medical history of  GERD (gastroesophageal reflux disease), Hyperparathyroidism (HCC), Hypertension, Other neutropenia (HCC) (6/10/2020), and Thrombocytopenia (HCC) (10/20/2020).   Past Surgical History[3]    Exam:   Vital signs reviewed   /58 (BP Location: Right arm, Patient Position: Sitting, BP Cuff Size: Adult)   Pulse (!) 52   Temp 37.1 °C (98.7 °F) (Temporal)   Ht 1.524 m (5')   Wt 51.7 kg (114 lb)   SpO2 98%  Body mass index is 22.26 kg/m².    Hearing good.    Dentition fair  Alert, oriented in no acute distress.  Eye contact is good, speech goal directed, affect calm.  Lungs: Normal effort, CTA bilaterally, no wheezes, rhonchi, or rales.    CV: Regular rate and rhythm with systolic murmurs. No rubs or gallops.  Ext: No clubbing, cyanosis, edema. Using 4 wheel walker with ambulation.      Assessment and Plan. The following treatment and monitoring plan is recommended:      1. Medicare annual wellness visit, subsequent (Primary)  Acute uncomplicated problem.  Annual wellness exam completed today.  Discussed that she can complete her updated COVID booster at the pharmacy and her second Shingrix dose at the pharmacy.    2. Gastroesophageal reflux disease without esophagitis  Chronic exacerbated problem.  No heartburn indigestion.  We discussed to stop pantoprazole to see if symptoms worsen.  If able to tolerate without pantoprazole continue to hold medication.    3. Pure hypercholesterolemia  Chronic stable problem.  Due for updated labs.  Continue atorvastatin 40 mg at bedtime.  - Lipid Profile; Future    4. Essential hypertension, benign  Chronic stable problem.  BP controlled today.  Continue amlodipine 5 mg daily and Toprol SR 25 mg daily.    5. Other iron deficiency anemias  Chronic exacerbated problem.  Continue ferrous sulfate 325 mg daily.    6. Hyperparathyroidism (HCC)  Chronic stable problem.  Continue follow-up with nephrology.  Continue cinacalcet 60 mg daily.    7. Stage 3b chronic kidney disease  Chronic  exacerbated problem.  Continue follow-up with nephrology.  Continue to avoid NSAIDs.  BP controlled.    8. Coronary artery disease involving native coronary artery of native heart without angina pectoris  Chronic stable problem.  Continue follow-up with cardiology.  Continue atorvastatin 40 mg at bedtime and clopidogrel 75 mg daily.    9. Intermittent diarrhea  Chronic exacerbated problem.  Start with food diary.    Services suggested: No services needed at this time  Health Care Screening: Age-appropriate preventive services recommended by USPTF and ACIP covered by Medicare were discussed today. Services ordered if indicated and agreed upon by the patient.  Referrals offered: Community-based lifestyle interventions to reduce health risks and promote self-management and wellness, fall prevention, nutrition, physical activity, tobacco-use cessation, weight loss, and mental health services as per orders if indicated.    Discussion today about general wellness and lifestyle habits:    Prevent falls and reduce trip hazards; Cautioned about securing or removing rugs.  Have a working fire alarm and carbon monoxide detector;   Engage in regular physical activity and social activities     Follow-up: Return in about 5 months (around 12/14/2025) for Hypertension.    Please note that this dictation was created using voice recognition software. I have made every reasonable attempt to correct obvious errors, but I expect that there are errors of grammar and possibly content that I did not discover before finalizing the note.           [1]   Current Outpatient Medications   Medication Sig Dispense Refill    atorvastatin (LIPITOR) 40 MG Tab TAKE 1 TABLET BY MOUTH AT  BEDTIME 90 Tablet 3    metoprolol SR (TOPROL XL) 25 MG TABLET SR 24 HR TAKE 1 TABLET BY MOUTH ONCE  DAILY 90 Tablet 3    amLODIPine (NORVASC) 5 MG Tab TAKE 1 TABLET BY MOUTH DAILY 90 Tablet 3    clopidogrel (PLAVIX) 75 MG Tab TAKE 1 TABLET BY MOUTH DAILY 90 Tablet 3     ferrous sulfate 325 (65 Fe) MG tablet Take 1 Tablet by mouth every day. 90 Tablet 1    Cinacalcet HCl 60 MG Tab Take 1 Tablet by mouth every day.       No current facility-administered medications for this visit.   [2]   Social History  Tobacco Use    Smoking status: Never     Passive exposure: Never    Smokeless tobacco: Never   Vaping Use    Vaping status: Never Used   Substance Use Topics    Alcohol use: No     Comment: holidays    Drug use: No   [3]   Past Surgical History:  Procedure Laterality Date    KNEE REPLACEMENT, TOTAL Left

## 2025-08-02 ENCOUNTER — APPOINTMENT (OUTPATIENT)
Dept: LAB | Facility: MEDICAL CENTER | Age: OVER 89
End: 2025-08-02
Attending: INTERNAL MEDICINE
Payer: MEDICARE

## 2025-08-22 ENCOUNTER — HOSPITAL ENCOUNTER (OUTPATIENT)
Dept: LAB | Facility: MEDICAL CENTER | Age: OVER 89
End: 2025-08-22
Payer: MEDICARE

## 2025-08-22 PROCEDURE — 82570 ASSAY OF URINE CREATININE: CPT

## 2025-08-22 PROCEDURE — 82043 UR ALBUMIN QUANTITATIVE: CPT

## 2025-08-25 LAB
COLLECT DURATION TIME SPEC: ABNORMAL HR
CREAT 24H UR-MCNC: 49 MG/DL
MICROALBUMIN 24H UR-MCNC: 3.7 MG/DL
MICROALBUMIN/CREAT 24H UR: 76 MG/G (ref 0–30)
SPECIMEN VOL ?TM UR: ABNORMAL ML